# Patient Record
Sex: MALE | Race: BLACK OR AFRICAN AMERICAN | Employment: FULL TIME | URBAN - METROPOLITAN AREA
[De-identification: names, ages, dates, MRNs, and addresses within clinical notes are randomized per-mention and may not be internally consistent; named-entity substitution may affect disease eponyms.]

---

## 2017-01-26 DIAGNOSIS — R60.0 BILATERAL EDEMA OF LOWER EXTREMITY: ICD-10-CM

## 2017-01-26 DIAGNOSIS — I10 ESSENTIAL HYPERTENSION: ICD-10-CM

## 2017-01-26 DIAGNOSIS — J45.20 ASTHMA, MILD INTERMITTENT: ICD-10-CM

## 2017-02-02 RX ORDER — ALBUTEROL SULFATE 90 UG/1
1 AEROSOL, METERED RESPIRATORY (INHALATION)
Qty: 1 INHALER | Refills: 11 | Status: SHIPPED | OUTPATIENT
Start: 2017-02-02 | End: 2017-04-11 | Stop reason: SDUPTHER

## 2017-02-02 RX ORDER — FUROSEMIDE 80 MG/1
TABLET ORAL
Qty: 30 TAB | Refills: 2 | Status: SHIPPED | OUTPATIENT
Start: 2017-02-02 | End: 2017-07-04 | Stop reason: SDUPTHER

## 2017-02-02 RX ORDER — OLMESARTAN MEDOXOMIL, AMLODIPINE AND HYDROCHLOROTHIAZIDE TABLET 40/10/25 MG 40; 10; 25 MG/1; MG/1; MG/1
TABLET ORAL
Qty: 30 TAB | Refills: 5 | Status: SHIPPED | OUTPATIENT
Start: 2017-02-02 | End: 2017-08-03 | Stop reason: SDUPTHER

## 2017-02-23 DIAGNOSIS — E78.5 HYPERLIPIDEMIA: ICD-10-CM

## 2017-02-23 DIAGNOSIS — I10 ESSENTIAL HYPERTENSION: ICD-10-CM

## 2017-02-23 DIAGNOSIS — E03.8 TSH (THYROID-STIMULATING HORMONE DEFICIENCY): ICD-10-CM

## 2017-02-23 DIAGNOSIS — E55.9 VITAMIN D DEFICIENCY: ICD-10-CM

## 2017-02-27 RX ORDER — ERGOCALCIFEROL 1.25 MG/1
CAPSULE ORAL
Qty: 4 CAP | Refills: 0 | Status: SHIPPED | OUTPATIENT
Start: 2017-02-27 | End: 2017-11-20 | Stop reason: SDUPTHER

## 2017-04-11 ENCOUNTER — OFFICE VISIT (OUTPATIENT)
Dept: FAMILY MEDICINE CLINIC | Age: 47
End: 2017-04-11

## 2017-04-11 VITALS
RESPIRATION RATE: 18 BRPM | BODY MASS INDEX: 42.66 KG/M2 | WEIGHT: 315 LBS | OXYGEN SATURATION: 95 % | HEIGHT: 72 IN | DIASTOLIC BLOOD PRESSURE: 83 MMHG | SYSTOLIC BLOOD PRESSURE: 152 MMHG | HEART RATE: 63 BPM | TEMPERATURE: 97.3 F

## 2017-04-11 DIAGNOSIS — E78.00 HYPERCHOLESTEROLEMIA: ICD-10-CM

## 2017-04-11 DIAGNOSIS — R94.31 EKG, ABNORMAL: Primary | ICD-10-CM

## 2017-04-11 DIAGNOSIS — J20.9 ACUTE BRONCHITIS, UNSPECIFIED ORGANISM: ICD-10-CM

## 2017-04-11 DIAGNOSIS — I10 ESSENTIAL HYPERTENSION: ICD-10-CM

## 2017-04-11 DIAGNOSIS — J45.21 MILD INTERMITTENT ASTHMA WITH ACUTE EXACERBATION: ICD-10-CM

## 2017-04-11 LAB — HBA1C MFR BLD HPLC: 8.7 %

## 2017-04-11 RX ORDER — GUAIFENESIN AND DEXTROMETHORPHAN HYDROBROMIDE 1200; 60 MG/1; MG/1
1 TABLET, EXTENDED RELEASE ORAL
Qty: 30 TAB | Refills: 0
Start: 2017-04-11 | End: 2017-07-11 | Stop reason: ALTCHOICE

## 2017-04-11 RX ORDER — DEXAMETHASONE 1.5 MG/1
TABLET ORAL
Qty: 21 TAB | Refills: 0 | Status: SHIPPED | OUTPATIENT
Start: 2017-04-11 | End: 2017-07-11 | Stop reason: ALTCHOICE

## 2017-04-11 RX ORDER — HYDROCODONE POLISTIREX AND CHLORPHENIRAMINE POLISTIREX 10; 8 MG/5ML; MG/5ML
5 SUSPENSION, EXTENDED RELEASE ORAL
Qty: 120 ML | Refills: 0 | Status: SHIPPED | OUTPATIENT
Start: 2017-04-11 | End: 2017-11-20 | Stop reason: ALTCHOICE

## 2017-04-11 RX ORDER — ALBUTEROL SULFATE 90 UG/1
1 AEROSOL, METERED RESPIRATORY (INHALATION)
Qty: 1 INHALER | Refills: 11 | Status: SHIPPED | OUTPATIENT
Start: 2017-04-11 | End: 2019-01-07 | Stop reason: SDUPTHER

## 2017-04-11 RX ORDER — LEVOFLOXACIN 500 MG/1
500 TABLET, FILM COATED ORAL DAILY
Qty: 10 TAB | Refills: 0 | Status: SHIPPED | OUTPATIENT
Start: 2017-04-11 | End: 2017-04-21

## 2017-04-11 RX ORDER — GLIPIZIDE 10 MG/1
15 TABLET ORAL 2 TIMES DAILY
Qty: 90 TAB | Refills: 4 | Status: SHIPPED | OUTPATIENT
Start: 2017-04-11 | End: 2017-07-11 | Stop reason: SDUPTHER

## 2017-04-11 NOTE — MR AVS SNAPSHOT
Visit Information Date & Time Provider Department Dept. Phone Encounter #  
 4/11/2017  3:45 PM Joann Chandler MD [facility] OFFICE-[facility] [phone] [id number] Follow-up Instructions Return if symptoms worsen or fail to improve. Your Appointments 7/11/2017 10:45 AM  
ROUTINE CARE with Joann Chandler MD  
[facility] ([address]) Appt Note: 3 mnth fu  
 [address] [place] [id number] [phone] [phone] Upcoming Health Maintenance Date Due INFLUENZA AGE 9 TO ADULT 8/1/2016 LIPID PANEL Q1 3/28/2017 HEMOGLOBIN A1C Q6M 5/22/2017 EYE EXAM RETINAL OR DILATED Q1 7/5/2017 MICROALBUMIN Q1 8/8/2017 FOOT EXAM Q1 11/22/2017 DTaP/Tdap/Td series (2 - Td) 2/21/2024 Allergies as of 4/11/2017  Review Complete On: 4/11/2017 By: Willow Can LPN Severity Noted Reaction Type Reactions Wheat  01/12/2015    Hives Current Immunizations  Reviewed on 11/9/2015 Name Date Influenza Vaccine 11/1/2014 Influenza Vaccine PF  Deferred (Patient Refused) Influenza Vaccine Split 1/4/2012, 9/28/2010 Pneumococcal Polysaccharide (PPSV-23) 2/21/2014 Tdap 2/21/2014 Not reviewed this visit You Were Diagnosed With   
  
 Codes Comments EKG, abnormal    -  Primary ICD-10-CM: R94.31 
ICD-9-CM: 794.31 Acute bronchitis, unspecified organism     ICD-10-CM: J20.9 ICD-9-CM: 466.0 Mild intermittent asthma with acute exacerbation     ICD-10-CM: J45.21 ICD-9-CM: 787.39 Essential hypertension     ICD-10-CM: I10 
ICD-9-CM: 401.9 Hypercholesterolemia     ICD-10-CM: E78.00 ICD-9-CM: 272.0 Vitals BP Pulse Temp Resp Height(growth percentile) Weight(growth percentile)  152/83 (BP 1 Location: Left arm, BP Patient Position: Sitting) 63 97.3 °F (36.3 °C) (Oral) 18 6' (1.829 m) (!) 404 lb 12.8 oz (183.6 kg) SpO2 BMI Smoking Status 95% 54.9 kg/m2 Never Smoker Vitals History BMI and BSA Data Body Mass Index Body Surface Area 54.9 kg/m 2 3.05 m 2 Preferred Pharmacy Pharmacy Name Phone [facility] [address], [address] Your Updated Medication List  
  
   
This list is accurate as of: 4/11/17  5:02 PM.  Always use your most recent med list.  
  
  
  
  
 albuterol 90 mcg/actuation inhaler Commonly known as:  PROAIR HFA Take 1 Puff by inhalation every six (6) hours as needed for Wheezing or Shortness of Breath. aspirin 81 mg tablet Take 1 Tab by mouth daily. betamethasone dipropionate 0.05 % topical cream  
Commonly known as:  Beatrice Prow Apply  to affected area two (2) times a day. chlorpheniramine-HYDROcodone 10-8 mg/5 mL suspension Commonly known as:  Felisha Orange Take 5 mL by mouth every twelve (12) hours as needed for Cough. Max Daily Amount: 10 mL. dexamethasone 1.5 mg tablet Commonly known as:  DECADRON Day 1: 6 tabs by mouth,  Day 2: 5 tabs orally  Day 3: 4 tabs orally   Day 4: 3 tabs orally  Day 5: 2 tabs orally  Day 6: 1 tab orally with 12 oz of water daily for the total of 6 days  
  
 dextromethorphan-guaiFENesin 60-1,200 mg Tb12 Commonly known as:  [place] [address] DM Take 1 Tab by mouth two (2) times daily as needed. Indications: COLD SYMPTOMS, COUGH  
  
 empagliflozin-linagliptin 25-5 mg Tab Commonly known as:  GLYXAMBI Take 1 Tab by mouth daily. ergocalciferol 50,000 unit capsule Commonly known as:  ERGOCALCIFEROL  
TAKE 1 CAPSULE BY MOUTH EVERY 7 DAYS  
  
 fluticasone-salmeterol 250-50 mcg/dose diskus inhaler Commonly known as:  ADVAIR Take 1 Puff by inhalation every twelve (12) hours. furosemide 80 mg tablet Commonly known as:  LASIX TAKE ONE TABLET BY MOUTH  ONCE DAILY  Indications: Edema  
  
 glipiZIDE 10 mg tablet Commonly known as:  Waldo Argue Take 1.5 Tabs by mouth two (2) times a day. glucose blood VI test strips strip Commonly known as:  ASCENSIA AUTODISC VI, ONE TOUCH ULTRA TEST VI  
by Does Not Apply route. As directed  
  
 ketoconazole 2 % topical cream  
Commonly known as:  NIZORAL Apply  to affected area two (2) times a day. Lancets Misc  
by Does Not Apply route. As directed  
  
 levoFLOXacin 500 mg tablet Commonly known as:  Rella Gene Take 1 Tab by mouth daily for 10 days. metFORMIN 1,000 mg tablet Commonly known as:  GLUCOPHAGE Take 1 Tab by mouth two (2) times daily (with meals). Olmesartan-amLODIPine-HCTZ 40-10-25 mg Tab Commonly known as:  TRIBENZOR  
TAKE ONE TABLET BY MOUTH EVERY DAY  
  
 potassium chloride 20 mEq tablet Commonly known as:  K-DUR, KLOR-CON Take 1 Tab by mouth two (2) times a day. Indications: HYPOKALEMIA  
  
 tamsulosin 0.4 mg capsule Commonly known as:  FLOMAX Take 1 Cap by mouth daily. Prescriptions Printed Refills  
 levoFLOXacin (LEVAQUIN) 500 mg tablet 0 Sig: Take 1 Tab by mouth daily for 10 days. Class: Print Route: Oral  
 chlorpheniramine-HYDROcodone (TUSSIONEX) 10-8 mg/5 mL suspension 0 Sig: Take 5 mL by mouth every twelve (12) hours as needed for Cough. Max Daily Amount: 10 mL. Class: Print Route: Oral  
 dexamethasone (DECADRON) 1.5 mg tablet 0 Sig: Day 1: 6 tabs by mouth,  
Day 2: 5 tabs orally Day 3: 4 tabs orally Day 4: 3 tabs orally Day 5: 2 tabs orally Day 6: 1 tab orally with 12 oz of water daily for the total of 6 days Class: Print  
 glipiZIDE (GLUCOTROL) 10 mg tablet 4 Sig: Take 1.5 Tabs by mouth two (2) times a day. Class: Print Route: Oral  
 albuterol (PROAIR HFA) 90 mcg/actuation inhaler 11  Sig: Take 1 Puff by inhalation every six (6) hours as needed for Wheezing or Shortness of Breath. Class: Print Route: Inhalation We Performed the Following AMB POC HEMOGLOBIN A1C [09905 CPT(R)] CBC W/O DIFF [56921 CPT(R)] LIPID PANEL [11818 CPT(R)] METABOLIC PANEL, COMPREHENSIVE [95824 CPT(R)] MICROALBUMIN, UR, RAND W/ MICROALBUMIN/CREA RATIO [id number] CPT(R)] TSH 3RD GENERATION [97186 CPT(R)] Follow-up Instructions Return if symptoms worsen or fail to improve. Introducing [facility] SERVICES! Dear Lucia Ríos: 
Thank you for requesting a [employer] account. Our records indicate that you already have an active [employer] account. You can access your account anytime at [email or web address] Did you know that you can access your hospital and ER discharge instructions at any time in [employer]? You can also review all of your test results from your hospital stay or ER visit. Additional Information If you have questions, please visit the Frequently Asked Questions section of the [employer] website at [email or web address] Remember, [employer] is NOT to be used for urgent needs. For medical emergencies, dial 911. Now available from your iPhone and Android! Please provide this summary of care documentation to your next provider. Your primary care clinician is listed as Jeremy Hannah. If you have any questions after today's visit, please call [phone].

## 2017-04-11 NOTE — LETTER
NOTIFICATION RETURN TO WORK / SCHOOL 
 
4/11/2017 4:52 PM 
 
Mr. Miladis Rodriguez [address] [place] [address] To Whom It May Concern: 
 
Miladis Rodriguez is currently under the care of [facility] OFFICE-ANN[facility]. He will return to work/school on: 4/14/2017 If there are questions or concerns please have the patient contact our office. Sincerely, Cassy Claire MD

## 2017-04-11 NOTE — PROGRESS NOTES
Chief Complaint   Patient presents with    Diabetes    Cholesterol Problem    Hypertension     4 mo check

## 2017-04-11 NOTE — PROGRESS NOTES
HISTORY OF PRESENT ILLNESS  César Ratliff is a 48 y.o. male. HPI   DMtype II    Compliant w/ meds, having no diabetic diet, has been doing a lot of pasta and not been able to be active,  obtains home glucose monitoring averaging  200's  . No Rf needed for today. Denies any tingling sensation, polyurea and polydipsia, last a1c was at target 7.% and today is at 8.9%    . Last podiatry visit 2015   And last eye exam was 2015  Last urine microalbumin 2016 and was normal  . Feeling better since the last visit. Upper respiratory problem    Started >14 days ago not better,   otc not helping, have a very bad Sore throat, with a lot of painful Cough which are Productive yellowish ,++ hx of asthma , lost his Inhaler, last use was few months ago, upotdate w/ his vaccinations there has been a lot of decrease in the patient's sleep pattern, in addition there has not been some muscle ache responding to OTC , no diarhea, no ear ache,also there has been a decrease in the appetite, has been had an exposure to sick person, fortunately a none smoker, unable to do his job, needs work note toady be excused for 1-2 days      HTN  Today pt present for Bp check and the patient stating that so far there has been a Compliancy w/ the bp meds, he is not having had the low salt diet   the patient hasnot  been active life style and doesnot do the daily walking since the last visit,overall he denies Chest Pain, has no legs swelling no lightheadedness,  the pat has not been feeling anxious,       Current Outpatient Prescriptions   Medication Sig Dispense Refill    levoFLOXacin (LEVAQUIN) 500 mg tablet Take 1 Tab by mouth daily for 10 days. 10 Tab 0    chlorpheniramine-HYDROcodone (TUSSIONEX) 10-8 mg/5 mL suspension Take 5 mL by mouth every twelve (12) hours as needed for Cough. Max Daily Amount: 10 mL. 120 mL 0    dextromethorphan-guaiFENesin (MUCINEX DM) 60-1,200 mg Tb12 Take 1 Tab by mouth two (2) times daily as needed.  Indications: COLD SYMPTOMS, COUGH 30 Tab 0    dexamethasone (DECADRON) 1.5 mg tablet Day 1: 6 tabs by mouth,   Day 2: 5 tabs orally   Day 3: 4 tabs orally    Day 4: 3 tabs orally   Day 5: 2 tabs orally   Day 6: 1 tab orally with 12 oz of water daily for the total of 6 days 21 Tab 0    ergocalciferol (ERGOCALCIFEROL) 50,000 unit capsule TAKE 1 CAPSULE BY MOUTH EVERY 7 DAYS 4 Cap 0    albuterol (PROAIR HFA) 90 mcg/actuation inhaler Take 1 Puff by inhalation every six (6) hours as needed for Wheezing. 1 Inhaler 11    Olmesartan-amLODIPine-HCTZ (TRIBENZOR) 40-10-25 mg tab TAKE ONE TABLET BY MOUTH EVERY DAY 30 Tab 5    furosemide (LASIX) 80 mg tablet TAKE ONE TABLET BY MOUTH  ONCE DAILY  Indications: Edema 30 Tab 2    empagliflozin-linagliptin (GLYXAMBI) 25-5 mg tab Take 1 Tab by mouth daily. 30 Tab 6    metFORMIN (GLUCOPHAGE) 1,000 mg tablet Take 1 Tab by mouth two (2) times daily (with meals). 60 Tab 4    glipiZIDE (GLUCOTROL) 5 mg tablet Take 2 Tabs by mouth two (2) times a day. 120 Tab 4    ketoconazole (NIZORAL) 2 % topical cream Apply  to affected area two (2) times a day. 60 g 6    potassium chloride (K-DUR, KLOR-CON) 20 mEq tablet Take 1 Tab by mouth two (2) times a day. Indications: HYPOKALEMIA 60 Tab 2    betamethasone dipropionate (DIPROSONE) 0.05 % topical cream Apply  to affected area two (2) times a day. 15 g 0    aspirin 81 mg tablet Take 1 Tab by mouth daily. 30 Tab 11    glucose blood VI test strips (ASCENSIA AUTODISC VI, ONE TOUCH ULTRA TEST VI) strip by Does Not Apply route. As directed 1 Package 11    Lancets Misc by Does Not Apply route. As directed   1 Package 11    tamsulosin (FLOMAX) 0.4 mg capsule Take 1 Cap by mouth daily. 30 Cap 0    fluticasone-salmeterol (ADVAIR) 250-50 mcg/dose diskus inhaler Take 1 Puff by inhalation every twelve (12) hours.  1 Inhaler 7     Allergies   Allergen Reactions    Wheat Hives     Past Medical History:   Diagnosis Date    Diabetes mellitus type II, uncontrolled ([facility] 8/8/2014    Diabetic polyneuropathy associated with type 2 diabetes mellitus ([facility] 11/22/2016    DM (diabetes mellitus) ([facility] 6/15/2010    GERD (gastroesophageal reflux disease) 2/21/2014    Hypercholesterolemia 6/15/2010    Hypertension     Prostate infarction 3/28/2016    Screening for depression 3/30/2014    Tinea cruris 3/13/2014    Urinary frequency 3/28/2016     History reviewed. No pertinent surgical history. Family History   Problem Relation Age of Onset    Lung Disease Mother     Hypertension Father     Heart Disease Father      Social History   Substance Use Topics    Smoking status: Never Smoker    Smokeless tobacco: Never Used    Alcohol use No      Lab Results  Component Value Date/Time   WBC 10.0 08/08/2016 12:24 PM   HGB 14.6 08/08/2016 12:24 PM   HCT 44.0 08/08/2016 12:24 PM   PLATELET 591 16/64/0974 12:24 PM   MCV 81 08/08/2016 12:24 PM       Lab Results  Component Value Date/Time   Hemoglobin A1c 8.6 08/08/2014 12:18 PM   Hemoglobin A1c 8.6 02/21/2014 10:04 AM   Hemoglobin A1c 8.3 07/20/2012 10:24 AM   Glucose 195 08/08/2016 12:24 PM   Glucose (POC) 292 06/15/2010 05:14 AM   Glucose POC 205mg/dl 06/15/2010 03:13 PM   Microalbumin/Creat ratio (mg/g creat) 117 06/15/2010 04:51 PM   Microalb/Creat ratio (ug/mg creat.) 76.4 08/08/2016 12:24 PM   Microalbumin,urine random 6.79 06/15/2010 04:51 PM   LDL, calculated 88 11/09/2015 10:55 AM   Creatinine 1.52 08/08/2016 12:24 PM      Lab Results  Component Value Date/Time   Cholesterol, total 151 03/28/2016 12:12 PM   HDL Cholesterol 45 03/28/2016 12:12 PM   LDL, calculated 88 11/09/2015 10:55 AM   Triglyceride 117 11/09/2015 10:55 AM   CHOL/HDL Ratio 4.0 08/17/2010 10:34 AM          Review of Systems   Constitutional: Positive for chills, fever and malaise/fatigue. HENT: Positive for congestion and sore throat. Negative for ear pain and nosebleeds. Eyes: Negative for blurred vision, pain and discharge.    Respiratory: Positive for cough, sputum production and wheezing. Negative for shortness of breath. Cardiovascular: Negative for chest pain and leg swelling. Gastrointestinal: Negative for constipation, diarrhea, nausea and vomiting. Genitourinary: Negative for frequency. Musculoskeletal: Negative for joint pain. Skin: Negative for itching and rash. Neurological: Positive for headaches. Psychiatric/Behavioral: Negative for depression. The patient is not nervous/anxious. Physical Exam   Constitutional: He is oriented to person, place, and time. He appears well-developed and well-nourished. HENT:   Head: Normocephalic and atraumatic. Nose: Mucosal edema and rhinorrhea present. Mouth/Throat: Mucous membranes are dry. Posterior oropharyngeal edema and posterior oropharyngeal erythema present. No oropharyngeal exudate. Eyes: Conjunctivae and EOM are normal.   Neck: Normal range of motion. Neck supple. Cardiovascular: Normal rate, regular rhythm and normal heart sounds. No murmur heard. Pulmonary/Chest: Effort normal and breath sounds normal. No respiratory distress. Abdominal: Soft. Bowel sounds are normal. He exhibits no distension. There is no rebound. Musculoskeletal: He exhibits no edema or tenderness. Lymphadenopathy:     He has cervical adenopathy. Neurological: He is alert and oriented to person, place, and time. Skin: Skin is warm. No erythema. Psychiatric: He has a normal mood and affect. His behavior is normal.   Nursing note and vitals reviewed. ASSESSMENT and Saurabh Rangel was seen today for diabetes, cholesterol problem and hypertension.     Diagnoses and all orders for this visit:    EKG, abnormal  -     CBC W/O DIFF  -     METABOLIC PANEL, COMPREHENSIVE  -     TSH 3RD GENERATION  -     AMB POC HEMOGLOBIN A1C  -     Cancel: CHOLESTEROL, TOTAL  -     Cancel: CHOLESTEROL, HDL  -     MICROALBUMIN, UR, RAND W/ MICROALBUMIN/CREA RATIO  -     LIPID PANEL    Acute bronchitis, unspecified organism  -     CBC W/O DIFF  -     METABOLIC PANEL, COMPREHENSIVE  -     TSH 3RD GENERATION  -     AMB POC HEMOGLOBIN A1C  -     Cancel: CHOLESTEROL, TOTAL  -     Cancel: CHOLESTEROL, HDL  -     MICROALBUMIN, UR, RAND W/ MICROALBUMIN/CREA RATIO  -     LIPID PANEL    Mild intermittent asthma with acute exacerbation  -     CBC W/O DIFF  -     METABOLIC PANEL, COMPREHENSIVE  -     TSH 3RD GENERATION  -     AMB POC HEMOGLOBIN A1C  -     Cancel: CHOLESTEROL, TOTAL  -     Cancel: CHOLESTEROL, HDL  -     MICROALBUMIN, UR, RAND W/ MICROALBUMIN/CREA RATIO  -     LIPID PANEL  -     albuterol (PROAIR HFA) 90 mcg/actuation inhaler; Take 1 Puff by inhalation every six (6) hours as needed for Wheezing or Shortness of Breath. Essential hypertension  -     CBC W/O DIFF  -     METABOLIC PANEL, COMPREHENSIVE  -     TSH 3RD GENERATION  -     AMB POC HEMOGLOBIN A1C  -     Cancel: CHOLESTEROL, TOTAL  -     Cancel: CHOLESTEROL, HDL  -     MICROALBUMIN, UR, RAND W/ MICROALBUMIN/CREA RATIO  -     LIPID PANEL    Hypercholesterolemia  -     CBC W/O DIFF  -     METABOLIC PANEL, COMPREHENSIVE  -     TSH 3RD GENERATION  -     AMB POC HEMOGLOBIN A1C  -     Cancel: CHOLESTEROL, TOTAL  -     Cancel: CHOLESTEROL, HDL  -     MICROALBUMIN, UR, RAND W/ MICROALBUMIN/CREA RATIO  -     LIPID PANEL    Other orders  -     levoFLOXacin (LEVAQUIN) 500 mg tablet; Take 1 Tab by mouth daily for 10 days. -     chlorpheniramine-HYDROcodone (TUSSIONEX) 10-8 mg/5 mL suspension; Take 5 mL by mouth every twelve (12) hours as needed for Cough. Max Daily Amount: 10 mL. -     dextromethorphan-guaiFENesin (MUCINEX DM) 60-1,200 mg Tb12; Take 1 Tab by mouth two (2) times daily as needed.  Indications: COLD SYMPTOMS, COUGH  -     dexamethasone (DECADRON) 1.5 mg tablet; Day 1: 6 tabs by mouth,   Day 2: 5 tabs orally   Day 3: 4 tabs orally    Day 4: 3 tabs orally   Day 5: 2 tabs orally   Day 6: 1 tab orally with 12 oz of water daily for the total of 6 days  - glipiZIDE (GLUCOTROL) 10 mg tablet; Take 1.5 Tabs by mouth two (2) times a day.

## 2017-04-12 LAB
ALBUMIN SERPL-MCNC: 3.7 G/DL (ref 3.5–5.5)
ALBUMIN/CREAT UR: 113.8 MG/G CREAT (ref 0–30)
ALBUMIN/GLOB SERPL: 0.9 {RATIO} (ref 1.2–2.2)
ALP SERPL-CCNC: 62 IU/L (ref 39–117)
ALT SERPL-CCNC: 24 IU/L (ref 0–44)
AST SERPL-CCNC: 21 IU/L (ref 0–40)
BILIRUB SERPL-MCNC: 0.4 MG/DL (ref 0–1.2)
BUN SERPL-MCNC: 19 MG/DL (ref 6–24)
BUN/CREAT SERPL: 14 (ref 9–20)
CALCIUM SERPL-MCNC: 9.1 MG/DL (ref 8.7–10.2)
CHLORIDE SERPL-SCNC: 97 MMOL/L (ref 96–106)
CHOLEST SERPL-MCNC: 156 MG/DL (ref 100–199)
CO2 SERPL-SCNC: 26 MMOL/L (ref 18–29)
CREAT SERPL-MCNC: 1.35 MG/DL (ref 0.76–1.27)
CREAT UR-MCNC: 55 MG/DL
ERYTHROCYTE [DISTWIDTH] IN BLOOD BY AUTOMATED COUNT: 16.6 % (ref 12.3–15.4)
GLOBULIN SER CALC-MCNC: 4.2 G/DL (ref 1.5–4.5)
GLUCOSE SERPL-MCNC: 201 MG/DL (ref 65–99)
HCT VFR BLD AUTO: 44.2 % (ref 37.5–51)
HDLC SERPL-MCNC: 42 MG/DL
HGB BLD-MCNC: 14 G/DL (ref 12.6–17.7)
LDLC SERPL CALC-MCNC: 82 MG/DL (ref 0–99)
MCH RBC QN AUTO: 27.2 PG (ref 26.6–33)
MCHC RBC AUTO-ENTMCNC: 31.7 G/DL (ref 31.5–35.7)
MCV RBC AUTO: 86 FL (ref 79–97)
MICROALBUMIN UR-MCNC: 62.6 UG/ML
PLATELET # BLD AUTO: 238 X10E3/UL (ref 150–379)
POTASSIUM SERPL-SCNC: 4 MMOL/L (ref 3.5–5.2)
PROT SERPL-MCNC: 7.9 G/DL (ref 6–8.5)
RBC # BLD AUTO: 5.15 X10E6/UL (ref 4.14–5.8)
SODIUM SERPL-SCNC: 142 MMOL/L (ref 134–144)
TRIGL SERPL-MCNC: 162 MG/DL (ref 0–149)
TSH SERPL DL<=0.005 MIU/L-ACNC: 3.07 UIU/ML (ref 0.45–4.5)
VLDLC SERPL CALC-MCNC: 32 MG/DL (ref 5–40)
WBC # BLD AUTO: 9.8 X10E3/UL (ref 3.4–10.8)

## 2017-07-04 DIAGNOSIS — R60.0 BILATERAL EDEMA OF LOWER EXTREMITY: ICD-10-CM

## 2017-07-05 RX ORDER — FUROSEMIDE 80 MG/1
TABLET ORAL
Qty: 30 TAB | Refills: 0 | Status: SHIPPED | OUTPATIENT
Start: 2017-07-05 | End: 2017-08-07 | Stop reason: SDUPTHER

## 2017-07-11 ENCOUNTER — OFFICE VISIT (OUTPATIENT)
Dept: FAMILY MEDICINE CLINIC | Age: 47
End: 2017-07-11

## 2017-07-11 VITALS
DIASTOLIC BLOOD PRESSURE: 77 MMHG | WEIGHT: 315 LBS | TEMPERATURE: 98.3 F | HEIGHT: 72 IN | SYSTOLIC BLOOD PRESSURE: 147 MMHG | HEART RATE: 62 BPM | RESPIRATION RATE: 14 BRPM | BODY MASS INDEX: 42.66 KG/M2 | OXYGEN SATURATION: 97 %

## 2017-07-11 DIAGNOSIS — E78.00 HYPERCHOLESTEROLEMIA: ICD-10-CM

## 2017-07-11 DIAGNOSIS — E11.42 DIABETIC POLYNEUROPATHY ASSOCIATED WITH TYPE 2 DIABETES MELLITUS (HCC): ICD-10-CM

## 2017-07-11 DIAGNOSIS — I10 ESSENTIAL HYPERTENSION: Primary | ICD-10-CM

## 2017-07-11 DIAGNOSIS — E55.9 VITAMIN D DEFICIENCY: ICD-10-CM

## 2017-07-11 DIAGNOSIS — R60.0 BILATERAL EDEMA OF LOWER EXTREMITY: ICD-10-CM

## 2017-07-11 LAB — HBA1C MFR BLD HPLC: 7.8 %

## 2017-07-11 RX ORDER — GLIPIZIDE 10 MG/1
15 TABLET ORAL 2 TIMES DAILY
Qty: 90 TAB | Refills: 4
Start: 2017-07-11 | End: 2017-10-08 | Stop reason: SDUPTHER

## 2017-07-11 RX ORDER — METOLAZONE 5 MG/1
5 TABLET ORAL DAILY
Qty: 30 TAB | Refills: 1 | Status: SHIPPED | OUTPATIENT
Start: 2017-07-11 | End: 2017-09-05 | Stop reason: SDUPTHER

## 2017-07-11 RX ORDER — POTASSIUM CHLORIDE 20 MEQ/1
20 TABLET, EXTENDED RELEASE ORAL 3 TIMES DAILY
Qty: 90 TAB | Refills: 2 | Status: SHIPPED | OUTPATIENT
Start: 2017-07-11 | End: 2017-11-20 | Stop reason: SDUPTHER

## 2017-07-11 NOTE — PATIENT INSTRUCTIONS

## 2017-07-11 NOTE — PROGRESS NOTES
HISTORY OF PRESENT ILLNESS  Antonio Bearden is a 48 y.o. male. HPI   DMtype II    Compliant w/ meds, having some kind of diabetic diet, anddoes his daily walking obtains home glucose monitoring averaging  140's  . No Rf needed for today. Denies any tingling sensation, polyurea and polydipsia,   last a1c was not at target 8.7%%    . Last podiatry visit 2017   And last eye exam was 2017  Last urine microalbumin 2017 and was abnormal  . Feeling better since the last visit. Taking 80mg of lasix not working, and compliant with k intake as wt but his wt has not changed    Vitals 7/11/2017 4/11/2017 4/11/2017 11/22/2016 11/22/2016   Weight 404 lb 8 oz  404 lb 12.8 oz  393 lb     Vitals 8/8/2016   Weight 386 lb 12.8 oz       Current Outpatient Prescriptions   Medication Sig Dispense Refill    furosemide (LASIX) 80 mg tablet TAKE ONE TABLET BY MOUTH ONCE DAILY 30 Tab 0    glipiZIDE (GLUCOTROL) 10 mg tablet Take 1.5 Tabs by mouth two (2) times a day. 90 Tab 4    albuterol (PROAIR HFA) 90 mcg/actuation inhaler Take 1 Puff by inhalation every six (6) hours as needed for Wheezing or Shortness of Breath. 1 Inhaler 11    ergocalciferol (ERGOCALCIFEROL) 50,000 unit capsule TAKE 1 CAPSULE BY MOUTH EVERY 7 DAYS 4 Cap 0    Olmesartan-amLODIPine-HCTZ (TRIBENZOR) 40-10-25 mg tab TAKE ONE TABLET BY MOUTH EVERY DAY 30 Tab 5    empagliflozin-linagliptin (GLYXAMBI) 25-5 mg tab Take 1 Tab by mouth daily. 30 Tab 6    metFORMIN (GLUCOPHAGE) 1,000 mg tablet Take 1 Tab by mouth two (2) times daily (with meals). 60 Tab 4    ketoconazole (NIZORAL) 2 % topical cream Apply  to affected area two (2) times a day. 60 g 6    potassium chloride (K-DUR, KLOR-CON) 20 mEq tablet Take 1 Tab by mouth two (2) times a day. Indications: HYPOKALEMIA 60 Tab 2    betamethasone dipropionate (DIPROSONE) 0.05 % topical cream Apply  to affected area two (2) times a day. 15 g 0    aspirin 81 mg tablet Take 1 Tab by mouth daily.  30 Tab 11    glucose blood VI test strips (ASCENSIA AUTODISC VI, ONE TOUCH ULTRA TEST VI) strip by Does Not Apply route. As directed 1 Package 11    Lancets Misc by Does Not Apply route. As directed   1 Package 11    chlorpheniramine-HYDROcodone (TUSSIONEX) 10-8 mg/5 mL suspension Take 5 mL by mouth every twelve (12) hours as needed for Cough. Max Daily Amount: 10 mL. 120 mL 0    tamsulosin (FLOMAX) 0.4 mg capsule Take 1 Cap by mouth daily. 30 Cap 0    fluticasone-salmeterol (ADVAIR) 250-50 mcg/dose diskus inhaler Take 1 Puff by inhalation every twelve (12) hours. 1 Inhaler 7     Allergies   Allergen Reactions    Wheat Hives     Past Medical History:   Diagnosis Date    Diabetes mellitus type II, uncontrolled ([facility] 8/8/2014    Diabetic polyneuropathy associated with type 2 diabetes mellitus ([facility] [address] 11/22/2016    DM (diabetes mellitus) ([facility] [address] 6/15/2010    GERD (gastroesophageal reflux disease) 2/21/2014    Hypercholesterolemia 6/15/2010    Hypertension     Prostate infarction 3/28/2016    Screening for depression 3/30/2014    Tinea cruris 3/13/2014    Urinary frequency 3/28/2016     History reviewed. No pertinent surgical history.   Family History   Problem Relation Age of Onset    Lung Disease Mother     Hypertension Father     Heart Disease Father      Social History   Substance Use Topics    Smoking status: Never Smoker    Smokeless tobacco: Never Used    Alcohol use No      Lab Results  Component Value Date/Time   Hemoglobin A1c 8.6 08/08/2014 12:18 PM   Hemoglobin A1c 8.6 02/21/2014 10:04 AM   Hemoglobin A1c 8.3 07/20/2012 10:24 AM   Glucose 201 04/11/2017 04:22 PM   Glucose (POC) 292 06/15/2010 05:14 AM   Glucose POC 205mg/dl 06/15/2010 03:13 PM   Microalbumin/Creat ratio (mg/g creat) 117 06/15/2010 04:51 PM   Microalb/Creat ratio (ug/mg creat.) 113.8 04/11/2017 04:22 PM   Microalbumin,urine random 6.79 06/15/2010 04:51 PM   LDL, calculated 82 04/11/2017 04:22 PM   Creatinine 1.35 04/11/2017 04:22 PM      Lab Results  Component Value Date/Time   Cholesterol, total 156 04/11/2017 04:22 PM   HDL Cholesterol 42 04/11/2017 04:22 PM   LDL, calculated 82 04/11/2017 04:22 PM   Triglyceride 162 04/11/2017 04:22 PM   CHOL/HDL Ratio 4.0 08/17/2010 10:34 AM     Lab Results  Component Value Date/Time   ALT (SGPT) 24 04/11/2017 04:22 PM   AST (SGOT) 21 04/11/2017 04:22 PM   Alk. phosphatase 62 04/11/2017 04:22 PM   Bilirubin, total 0.4 04/11/2017 04:22 PM   Albumin 3.7 04/11/2017 04:22 PM   Protein, total 7.9 04/11/2017 04:22 PM   INR 1.2 06/12/2010 10:17 PM   Prothrombin time 12.7 06/12/2010 10:17 PM   PLATELET 001 86/06/4815 04:22 PM       Lab Results  Component Value Date/Time   GFR est non-AA 62 04/11/2017 04:22 PM   GFR est AA 71 04/11/2017 04:22 PM   Creatinine 1.35 04/11/2017 04:22 PM   BUN 19 04/11/2017 04:22 PM   Sodium 142 04/11/2017 04:22 PM   Potassium 4.0 04/11/2017 04:22 PM   Chloride 97 04/11/2017 04:22 PM   CO2 26 04/11/2017 04:22 PM   Magnesium 1.8 06/14/2010 04:45 AM            Review of Systems   Constitutional: Negative for chills and fever. HENT: Negative for ear pain and nosebleeds. Eyes: Negative for blurred vision, pain and discharge. Respiratory: Negative for shortness of breath. Cardiovascular: Negative for chest pain and leg swelling. Gastrointestinal: Negative for constipation, diarrhea, nausea and vomiting. Genitourinary: Negative for frequency. Musculoskeletal: Negative for joint pain. Skin: Negative for itching and rash. Neurological: Negative for headaches. Psychiatric/Behavioral: Negative for depression. The patient is not nervous/anxious. Physical Exam   Constitutional: He is oriented to person, place, and time. He appears well-developed and well-nourished. HENT:   Head: Normocephalic and atraumatic. Mouth/Throat: No oropharyngeal exudate. Eyes: Conjunctivae and EOM are normal.   Neck: Normal range of motion. Neck supple.    Cardiovascular: Normal rate, regular rhythm and normal heart sounds. No murmur heard. Pulmonary/Chest: Effort normal and breath sounds normal. No respiratory distress. Abdominal: Soft. Bowel sounds are normal. He exhibits no distension. There is no rebound. Musculoskeletal: He exhibits no edema or tenderness. Neurological: He is alert and oriented to person, place, and time. Skin: Skin is warm. No erythema. Psychiatric: He has a normal mood and affect. His behavior is normal.   Nursing note and vitals reviewed. ASSESSMENT and Shona Kathleen was seen today for diabetes and hypertension. Diagnoses and all orders for this visit:    Essential hypertension  -     CBC W/O DIFF  -     METABOLIC PANEL, COMPREHENSIVE  -     LIPID PANEL  -     VITAMIN D, 25 HYDROXY    Hypercholesterolemia  -     CBC W/O DIFF  -     METABOLIC PANEL, COMPREHENSIVE  -     LIPID PANEL  -     VITAMIN D, 25 HYDROXY    Vitamin D deficiency  -     CBC W/O DIFF  -     METABOLIC PANEL, COMPREHENSIVE  -     LIPID PANEL  -     VITAMIN D, 25 HYDROXY    Diabetic polyneuropathy associated with type 2 diabetes mellitus (HCC)  -     AMB POC HEMOGLOBIN A1C  -     CBC W/O DIFF  -     METABOLIC PANEL, COMPREHENSIVE  -     LIPID PANEL  -     VITAMIN D, 25 HYDROXY    Bilateral edema of lower extremity  -     potassium chloride (K-DUR, KLOR-CON) 20 mEq tablet; Take 1 Tab by mouth three (3) times daily. Indications: hypokalemia    Other orders  -     glipiZIDE (GLUCOTROL) 10 mg tablet; Take 1.5 Tabs by mouth two (2) times a day. -     empagliflozin-linagliptin (GLYXAMBI) 25-5 mg tab; Take 1 Tab by mouth daily. -     metOLazone (ZAROXOLYN) 5 mg tablet; Take 1 Tab by mouth daily.   -     CKD REPORT  -     DIABETES PATIENT EDUCATION

## 2017-07-11 NOTE — PROGRESS NOTES
Mary Gomez      Name and  verified        Chief Complaint   Patient presents with    Diabetes     3 month follow up    Hypertension         Health Maintenance reviewed-discussed with patient. Patient educated on the parts of a diabetes care plan  1. Meal plan  2. Plan for staying active  3. Diabetes medicine plan  4. Plan for checking blood sugar as ordered by Dr. Elver Mcguire  5. Schedule for diabetes follow up appt as recommended by provider      Patient received diabetic education handout. Patient decline retinal last eye exam stating was done two weeks ago.

## 2017-07-11 NOTE — MR AVS SNAPSHOT
Visit Information Date & Time Provider Department Dept. Phone Encounter #  
 7/11/2017 10:45 AM Elver Mcguire MD [address] [facility] [phone] [id number] Upcoming Health Maintenance Date Due  
 EYE EXAM RETINAL OR DILATED Q1 7/5/2017 INFLUENZA AGE 9 TO ADULT 8/1/2017 HEMOGLOBIN A1C Q6M 10/11/2017 FOOT EXAM Q1 11/22/2017 MICROALBUMIN Q1 4/11/2018 LIPID PANEL Q1 4/11/2018 DTaP/Tdap/Td series (2 - Td) 2/21/2024 Allergies as of 7/11/2017  Review Complete On: 7/11/2017 By: Allie Cabrera LPN Severity Noted Reaction Type Reactions Wheat  01/12/2015    Hives Current Immunizations  Reviewed on 11/9/2015 Name Date Influenza Vaccine 11/1/2014 Influenza Vaccine PF  Deferred (Patient Refused) Influenza Vaccine Split 1/4/2012, 9/28/2010 Pneumococcal Polysaccharide (PPSV-23) 2/21/2014 Tdap 2/21/2014 Not reviewed this visit You Were Diagnosed With   
  
 Codes Comments Essential hypertension    -  Primary ICD-10-CM: I10 
ICD-9-CM: 401.9 Hypercholesterolemia     ICD-10-CM: E78.00 ICD-9-CM: 272.0 Vitamin D deficiency     ICD-10-CM: E55.9 ICD-9-CM: 268.9 Diabetic polyneuropathy associated with type 2 diabetes mellitus [facility]     ICD-10-CM: E11.42 
ICD-9-CM: 250.60, 357.2 Bilateral edema of lower extremity     ICD-10-CM: R60.0 ICD-9-CM: 686. 3 Vitals BP Pulse Temp Resp Height(growth percentile) Weight(growth percentile) 147/77 (BP 1 Location: Left arm, BP Patient Position: At rest) 62 98.3 °F (36.8 °C) (Oral) 14 6' (1.829 m) (!) 404 lb 8 oz (183.5 kg) SpO2 BMI Smoking Status 97% 54.86 kg/m2 Never Smoker Vitals History BMI and BSA Data Body Mass Index Body Surface Area 54.86 kg/m 2 3.05 m 2 Preferred Pharmacy Pharmacy Name Phone [facility], [address] [facility] [address] Your Updated Medication List  
 This list is accurate as of: 7/11/17 11:28 AM.  Always use your most recent med list.  
  
  
  
  
 albuterol 90 mcg/actuation inhaler Commonly known as:  PROAIR HFA Take 1 Puff by inhalation every six (6) hours as needed for Wheezing or Shortness of Breath. aspirin 81 mg tablet Take 1 Tab by mouth daily. betamethasone dipropionate 0.05 % topical cream  
Commonly known as:  Gemma Raider Apply  to affected area two (2) times a day. chlorpheniramine-HYDROcodone 10-8 mg/5 mL suspension Commonly known as:  Christia Regulus Take 5 mL by mouth every twelve (12) hours as needed for Cough. Max Daily Amount: 10 mL. empagliflozin-linagliptin 25-5 mg Tab Commonly known as:  GLYXAMBI Take 1 Tab by mouth daily. ergocalciferol 50,000 unit capsule Commonly known as:  ERGOCALCIFEROL  
TAKE 1 CAPSULE BY MOUTH EVERY 7 DAYS  
  
 fluticasone-salmeterol 250-50 mcg/dose diskus inhaler Commonly known as:  ADVAIR Take 1 Puff by inhalation every twelve (12) hours. furosemide 80 mg tablet Commonly known as:  LASIX TAKE ONE TABLET BY MOUTH ONCE DAILY  
  
 glipiZIDE 10 mg tablet Commonly known as:  Evelyn Castro Take 1.5 Tabs by mouth two (2) times a day. glucose blood VI test strips strip Commonly known as:  ASCENSIA AUTODISC VI, ONE TOUCH ULTRA TEST VI  
by Does Not Apply route. As directed  
  
 ketoconazole 2 % topical cream  
Commonly known as:  NIZORAL Apply  to affected area two (2) times a day. Lancets Misc  
by Does Not Apply route. As directed  
  
 metFORMIN 1,000 mg tablet Commonly known as:  GLUCOPHAGE Take 1 Tab by mouth two (2) times daily (with meals). metOLazone 5 mg tablet Commonly known as:  Gordillo Arts Take 1 Tab by mouth daily. Olmesartan-amLODIPine-HCTZ 40-10-25 mg Tab Commonly known as:  TRIBENZOR  
TAKE ONE TABLET BY MOUTH EVERY DAY  
  
 potassium chloride 20 mEq tablet Commonly known as:  K-DUR, KLOR-CON  
 Take 1 Tab by mouth three (3) times daily. Indications: hypokalemia  
  
 tamsulosin 0.4 mg capsule Commonly known as:  FLOMAX Take 1 Cap by mouth daily. Prescriptions Printed Refills  
 metOLazone (ZAROXOLYN) 5 mg tablet 1 Sig: Take 1 Tab by mouth daily. Class: Print Route: Oral  
  
Prescriptions Sent to Pharmacy Refills  
 potassium chloride (K-DUR, KLOR-CON) 20 mEq tablet 2 Sig: Take 1 Tab by mouth three (3) times daily. Indications: hypokalemia Class: Normal  
 Pharmacy: [facility] [address] [place]  #: [phone] Route: Oral  
  
We Performed the Following AMB POC HEMOGLOBIN A1C [29240 CPT(R)] CBC W/O DIFF [32486 CPT(R)] LIPID PANEL [30615 CPT(R)] METABOLIC PANEL, COMPREHENSIVE [48639 CPT(R)] VITAMIN D, 25 HYDROXY [id number] CPT(R)] Patient Instructions Learning About Diabetes Food Guidelines Your Care Instructions Meal planning is important to manage diabetes. It helps keep your blood sugar at a target level (which you set with your doctor). You don't have to eat special foods. You can eat what your family eats, including sweets once in a while. But you do have to pay attention to how often you eat and how much you eat of certain foods. You may want to work with a dietitian or a certified diabetes educator (CDE) to help you plan meals and snacks. A dietitian or CDE can also help you lose weight if that is one of your goals. What should you know about eating carbs? Managing the amount of carbohydrate (carbs) you eat is an important part of healthy meals when you have diabetes. Carbohydrate is found in many foods. · Learn which foods have carbs. And learn the amounts of carbs in different foods. ¨ Bread, cereal, pasta, and rice have about 15 grams of carbs in a serving. A serving is 1 slice of bread (1 ounce), ½ cup of cooked cereal, or 1/3 cup of cooked pasta or rice. ¨ Fruits have 15 grams of carbs in a serving. A serving is 1 small fresh fruit, such as an apple or orange; ½ of a banana; ½ cup of cooked or canned fruit; ½ cup of fruit juice; 1 cup of melon or raspberries; or 2 tablespoons of dried fruit. ¨ Milk and no-sugar-added yogurt have 15 grams of carbs in a serving. A serving is 1 cup of milk or 2/3 cup of no-sugar-added yogurt. ¨ Starchy vegetables have 15 grams of carbs in a serving. A serving is ½ cup of mashed potatoes or sweet potato; 1 cup winter squash; ½ of a small baked potato; ½ cup of cooked beans; or ½ cup cooked corn or green peas. · Learn how much carbs to eat each day and at each meal. A dietitian or CDE can teach you how to keep track of the amount of carbs you eat. This is called carbohydrate counting. · If you are not sure how to count carbohydrate grams, use the Plate Method to plan meals. It is a good, quick way to make sure that you have a balanced meal. It also helps you spread carbs throughout the day. ¨ Divide your plate by types of foods. Put non-starchy vegetables on half the plate, meat or other protein food on one-quarter of the plate, and a grain or starchy vegetable in the final quarter of the plate. To this you can add a small piece of fruit and 1 cup of milk or yogurt, depending on how many carbs you are supposed to eat at a meal. 
· Try to eat about the same amount of carbs at each meal. Do not \"save up\" your daily allowance of carbs to eat at one meal. 
· Proteins have very little or no carbs per serving. Examples of proteins are beef, chicken, turkey, fish, eggs, tofu, cheese, cottage cheese, and peanut butter. A serving size of meat is 3 ounces, which is about the size of a deck of cards. Examples of meat substitute serving sizes (equal to 1 ounce of meat) are 1/4 cup of cottage cheese, 1 egg, 1 tablespoon of peanut butter, and ½ cup of tofu. How can you eat out and still eat healthy? · Learn to estimate the serving sizes of foods that have carbohydrate. If you measure food at home, it will be easier to estimate the amount in a serving of restaurant food. · If the meal you order has too much carbohydrate (such as potatoes, corn, or baked beans), ask to have a low-carbohydrate food instead. Ask for a salad or green vegetables. · If you use insulin, check your blood sugar before and after eating out to help you plan how much to eat in the future. · If you eat more carbohydrate at a meal than you had planned, take a walk or do other exercise. This will help lower your blood sugar. What else should you know? · Limit saturated fat, such as the fat from meat and dairy products. This is a healthy choice because people who have diabetes are at higher risk of heart disease. So choose lean cuts of meat and nonfat or low-fat dairy products. Use olive or canola oil instead of butter or shortening when cooking. · Don't skip meals. Your blood sugar may drop too low if you skip meals and take insulin or certain medicines for diabetes. · Check with your doctor before you drink alcohol. Alcohol can cause your blood sugar to drop too low. Alcohol can also cause a bad reaction if you take certain diabetes medicines. Follow-up care is a key part of your treatment and safety. Be sure to make and go to all appointments, and call your doctor if you are having problems. It's also a good idea to know your test results and keep a list of the medicines you take. Where can you learn more? Go to http://alma-isabelle.info/. Enter Q966 in the search box to learn more about \"Learning About Diabetes Food Guidelines. \" Current as of: March 13, 2017 Content Version: 11.3 © 7454-7457 [employer]. Care instructions adapted under license by [employer] (which disclaims liability or warranty for this information).  If you have questions about a medical condition or this instruction, always ask your healthcare professional. [address]  any warranty or liability for your use of this information. Introducing [facility]! Dear Irina Nunez: 
Thank you for requesting a My [employer] account. Our records indicate that you already have an active My [employer] account. You can access your account anytime at [email or web address] Did you know that you can access your hospital and ER discharge instructions at any time in My [employer]? You can also review all of your test results from your hospital stay or ER visit. Additional Information If you have questions, please visit the Frequently Asked Questions section of the My [employer] website at [email or web address] Remember, My [employer] is NOT to be used for urgent needs. For medical emergencies, dial 911. Now available from your iPhone and Android! Please provide this summary of care documentation to your next provider. Your primary care clinician is listed as Stevie Mandel. If you have any questions after today's visit, please call [phone].

## 2017-07-12 LAB
25(OH)D3+25(OH)D2 SERPL-MCNC: 15.6 NG/ML (ref 30–100)
ALBUMIN SERPL-MCNC: 4 G/DL (ref 3.5–5.5)
ALBUMIN/GLOB SERPL: 1.2 {RATIO} (ref 1.2–2.2)
ALP SERPL-CCNC: 57 IU/L (ref 39–117)
ALT SERPL-CCNC: 25 IU/L (ref 0–44)
AST SERPL-CCNC: 32 IU/L (ref 0–40)
BILIRUB SERPL-MCNC: 0.4 MG/DL (ref 0–1.2)
BUN SERPL-MCNC: 17 MG/DL (ref 6–24)
BUN/CREAT SERPL: 12 (ref 9–20)
CALCIUM SERPL-MCNC: 9.2 MG/DL (ref 8.7–10.2)
CHLORIDE SERPL-SCNC: 97 MMOL/L (ref 96–106)
CHOLEST SERPL-MCNC: 146 MG/DL (ref 100–199)
CO2 SERPL-SCNC: 26 MMOL/L (ref 18–29)
CREAT SERPL-MCNC: 1.43 MG/DL (ref 0.76–1.27)
ERYTHROCYTE [DISTWIDTH] IN BLOOD BY AUTOMATED COUNT: 15.6 % (ref 12.3–15.4)
GLOBULIN SER CALC-MCNC: 3.4 G/DL (ref 1.5–4.5)
GLUCOSE SERPL-MCNC: 89 MG/DL (ref 65–99)
HCT VFR BLD AUTO: 43.2 % (ref 37.5–51)
HDLC SERPL-MCNC: 44 MG/DL
HGB BLD-MCNC: 13.8 G/DL (ref 12.6–17.7)
INTERPRETATION: NORMAL
LDLC SERPL CALC-MCNC: 83 MG/DL (ref 0–99)
Lab: NORMAL
MCH RBC QN AUTO: 26.6 PG (ref 26.6–33)
MCHC RBC AUTO-ENTMCNC: 31.9 G/DL (ref 31.5–35.7)
MCV RBC AUTO: 83 FL (ref 79–97)
PLATELET # BLD AUTO: 247 X10E3/UL (ref 150–379)
POTASSIUM SERPL-SCNC: 3.9 MMOL/L (ref 3.5–5.2)
PROT SERPL-MCNC: 7.4 G/DL (ref 6–8.5)
RBC # BLD AUTO: 5.18 X10E6/UL (ref 4.14–5.8)
SODIUM SERPL-SCNC: 139 MMOL/L (ref 134–144)
TRIGL SERPL-MCNC: 94 MG/DL (ref 0–149)
VLDLC SERPL CALC-MCNC: 19 MG/DL (ref 5–40)
WBC # BLD AUTO: 10.1 X10E3/UL (ref 3.4–10.8)

## 2017-08-03 DIAGNOSIS — I10 ESSENTIAL HYPERTENSION: ICD-10-CM

## 2017-08-07 DIAGNOSIS — R60.0 BILATERAL EDEMA OF LOWER EXTREMITY: ICD-10-CM

## 2017-08-07 DIAGNOSIS — I10 ESSENTIAL HYPERTENSION: ICD-10-CM

## 2017-08-07 RX ORDER — OLMESARTAN MEDOXOMIL, AMLODIPINE AND HYDROCHLOROTHIAZIDE TABLET 40/10/25 MG 40; 10; 25 MG/1; MG/1; MG/1
TABLET ORAL
Qty: 30 TAB | Refills: 5 | Status: SHIPPED | OUTPATIENT
Start: 2017-08-07 | End: 2017-11-20 | Stop reason: SDUPTHER

## 2017-08-07 RX ORDER — FUROSEMIDE 80 MG/1
TABLET ORAL
Qty: 30 TAB | Refills: 0 | Status: SHIPPED | OUTPATIENT
Start: 2017-08-07 | End: 2017-08-21 | Stop reason: SDUPTHER

## 2017-08-07 RX ORDER — OLMESARTAN MEDOXOMIL, AMLODIPINE AND HYDROCHLOROTHIAZIDE TABLET 40/10/25 MG 40; 10; 25 MG/1; MG/1; MG/1
TABLET ORAL
Qty: 30 TAB | Refills: 0 | Status: SHIPPED | OUTPATIENT
Start: 2017-08-07 | End: 2017-11-20 | Stop reason: SDUPTHER

## 2017-08-07 RX ORDER — EMPAGLIFLOZIN AND LINAGLIPTIN 25; 5 MG/1; MG/1
TABLET, FILM COATED ORAL
Qty: 30 TAB | Refills: 0 | Status: SHIPPED | OUTPATIENT
Start: 2017-08-07 | End: 2017-11-20 | Stop reason: SDUPTHER

## 2017-08-09 RX ORDER — EMPAGLIFLOZIN AND LINAGLIPTIN 25; 5 MG/1; MG/1
TABLET, FILM COATED ORAL
Qty: 30 TAB | Refills: 6 | Status: SHIPPED | OUTPATIENT
Start: 2017-08-09 | End: 2017-11-20 | Stop reason: SDUPTHER

## 2017-08-10 RX ORDER — EMPAGLIFLOZIN AND LINAGLIPTIN 25; 5 MG/1; MG/1
TABLET, FILM COATED ORAL
Qty: 30 TAB | Refills: 6 | Status: SHIPPED | OUTPATIENT
Start: 2017-08-10 | End: 2017-11-20 | Stop reason: SDUPTHER

## 2017-08-21 ENCOUNTER — OFFICE VISIT (OUTPATIENT)
Dept: FAMILY MEDICINE CLINIC | Age: 47
End: 2017-08-21

## 2017-08-21 VITALS
OXYGEN SATURATION: 97 % | RESPIRATION RATE: 18 BRPM | BODY MASS INDEX: 42.66 KG/M2 | TEMPERATURE: 96.3 F | DIASTOLIC BLOOD PRESSURE: 78 MMHG | WEIGHT: 315 LBS | HEART RATE: 69 BPM | HEIGHT: 72 IN | SYSTOLIC BLOOD PRESSURE: 134 MMHG

## 2017-08-21 DIAGNOSIS — R60.0 BILATERAL EDEMA OF LOWER EXTREMITY: ICD-10-CM

## 2017-08-21 DIAGNOSIS — I10 ESSENTIAL HYPERTENSION: Primary | ICD-10-CM

## 2017-08-21 DIAGNOSIS — M25.562 ACUTE PAIN OF LEFT KNEE: ICD-10-CM

## 2017-08-21 LAB — HBA1C MFR BLD HPLC: 8.3 %

## 2017-08-21 RX ORDER — DICLOFENAC SODIUM 10 MG/G
2 GEL TOPICAL 4 TIMES DAILY
Qty: 100 G | Refills: 2 | Status: SHIPPED | OUTPATIENT
Start: 2017-08-21

## 2017-08-21 RX ORDER — FUROSEMIDE 80 MG/1
40 TABLET ORAL DAILY
Qty: 30 TAB | Refills: 3 | Status: SHIPPED | OUTPATIENT
Start: 2017-08-21 | End: 2017-11-20 | Stop reason: ALTCHOICE

## 2017-08-21 NOTE — PROGRESS NOTES
HISTORY OF PRESENT ILLNESS  Kash Lopez is a 50 y.o. male. HPI   Patient present with b/lSwelling of the lower ext,   Stating that he does a lot of walking but unfortunately walking has not been helping the swelling to go away, on the pt last visits, he did have much of the legs swelling and his weight was also worse than today's weight,    Today the patient denies leg pain, denies rash, and legs lesion,no dizziness,  the legs B swelling not only better but also the wt went up by close to 10 pounds sinnce the April of 2017   Left knee pain  Was cutting the grass, stumble on the ditch, felt the pain, worsening nightly, has been 2 weeks, and takig  3-4 IbPrfn not helping  Current Outpatient Prescriptions   Medication Sig Dispense Refill    GLYXAMBI 25-5 mg tab TAKE ONE TABLET BY MOUTH ONCE DAILY 30 Tab 6    GLYXAMBI 25-5 mg tab TAKE ONE TABLET BY MOUTH ONCE DAILY 30 Tab 6    GLYXAMBI 25-5 mg tab TAKE ONE TABLET BY MOUTH ONCE DAILY 30 Tab 0    Olmesartan-amLODIPine-HCTZ 40-10-25 mg tab TAKE ONE TABLET BY MOUTH ONCE DAILY 30 Tab 0    Olmesartan-amLODIPine-HCTZ (TRIBENZOR) 40-10-25 mg tab TAKE ONE TABLET BY MOUTH EVERY DAY 30 Tab 5    furosemide (LASIX) 80 mg tablet TAKE ONE TABLET BY MOUTH ONCE DAILY 30 Tab 0    glipiZIDE (GLUCOTROL) 10 mg tablet Take 1.5 Tabs by mouth two (2) times a day. 90 Tab 4    empagliflozin-linagliptin (GLYXAMBI) 25-5 mg tab Take 1 Tab by mouth daily. 30 Tab 6    metOLazone (ZAROXOLYN) 5 mg tablet Take 1 Tab by mouth daily. 30 Tab 1    potassium chloride (K-DUR, KLOR-CON) 20 mEq tablet Take 1 Tab by mouth three (3) times daily. Indications: hypokalemia 90 Tab 2    albuterol (PROAIR HFA) 90 mcg/actuation inhaler Take 1 Puff by inhalation every six (6) hours as needed for Wheezing or Shortness of Breath. 1 Inhaler 11    metFORMIN (GLUCOPHAGE) 1,000 mg tablet Take 1 Tab by mouth two (2) times daily (with meals).  60 Tab 4    ketoconazole (NIZORAL) 2 % topical cream Apply  to affected area two (2) times a day. 60 g 6    betamethasone dipropionate (DIPROSONE) 0.05 % topical cream Apply  to affected area two (2) times a day. 15 g 0    aspirin 81 mg tablet Take 1 Tab by mouth daily. 30 Tab 11    glucose blood VI test strips (ASCENSIA AUTODISC VI, ONE TOUCH ULTRA TEST VI) strip by Does Not Apply route. As directed 1 Package 11    Lancets Misc by Does Not Apply route. As directed   1 Package 11    chlorpheniramine-HYDROcodone (TUSSIONEX) 10-8 mg/5 mL suspension Take 5 mL by mouth every twelve (12) hours as needed for Cough. Max Daily Amount: 10 mL. 120 mL 0    ergocalciferol (ERGOCALCIFEROL) 50,000 unit capsule TAKE 1 CAPSULE BY MOUTH EVERY 7 DAYS 4 Cap 0    tamsulosin (FLOMAX) 0.4 mg capsule Take 1 Cap by mouth daily. 30 Cap 0    fluticasone-salmeterol (ADVAIR) 250-50 mcg/dose diskus inhaler Take 1 Puff by inhalation every twelve (12) hours. 1 Inhaler 7     Allergies   Allergen Reactions    Wheat Hives     Past Medical History:   Diagnosis Date    Diabetes mellitus type II, uncontrolled ([address] 8/8/2014    Diabetic polyneuropathy associated with type 2 diabetes mellitus ([address] 11/22/2016    DM (diabetes mellitus) [facility] [facility] 6/15/2010    GERD (gastroesophageal reflux disease) 2/21/2014    Hypercholesterolemia 6/15/2010    Hypertension     Prostate infarction 3/28/2016    Screening for depression 3/30/2014    Tinea cruris 3/13/2014    Urinary frequency 3/28/2016     History reviewed. No pertinent surgical history.   Family History   Problem Relation Age of Onset    Lung Disease Mother     Hypertension Father     Heart Disease Father      Social History   Substance Use Topics    Smoking status: Never Smoker    Smokeless tobacco: Never Used    Alcohol use No      Lab Results  Component Value Date/Time   WBC 10.1 07/11/2017 10:47 AM   HGB 13.8 07/11/2017 10:47 AM   HCT 43.2 07/11/2017 10:47 AM   PLATELET 797 56/66/6008 10:47 AM   MCV 83 07/11/2017 10:47 AM     Lab Results  Component Value Date/Time   Hemoglobin A1c 8.6 08/08/2014 12:18 PM   Hemoglobin A1c 8.6 02/21/2014 10:04 AM   Hemoglobin A1c 8.3 07/20/2012 10:24 AM   Glucose 89 07/11/2017 10:47 AM   Glucose (POC) 292 06/15/2010 05:14 AM   Glucose POC 205mg/dl 06/15/2010 03:13 PM   Microalbumin/Creat ratio (mg/g creat) 117 06/15/2010 04:51 PM   Microalb/Creat ratio (ug/mg creat.) 113.8 04/11/2017 04:22 PM   Microalbumin,urine random 6.79 06/15/2010 04:51 PM   LDL, calculated 83 07/11/2017 10:47 AM   Creatinine 1.43 07/11/2017 10:47 AM      Lab Results  Component Value Date/Time   GFR est non-AA 57 07/11/2017 10:47 AM   GFR est AA 66 07/11/2017 10:47 AM   Creatinine 1.43 07/11/2017 10:47 AM   BUN 17 07/11/2017 10:47 AM   Sodium 139 07/11/2017 10:47 AM   Potassium 3.9 07/11/2017 10:47 AM   Chloride 97 07/11/2017 10:47 AM   CO2 26 07/11/2017 10:47 AM   Magnesium 1.8 06/14/2010 04:45 AM          Review of Systems   Constitutional: Negative for chills and fever. HENT: Negative for ear pain and nosebleeds. Eyes: Negative for blurred vision, pain and discharge. Respiratory: Negative for shortness of breath. Cardiovascular: Negative for chest pain and leg swelling. Gastrointestinal: Negative for constipation, diarrhea, nausea and vomiting. Genitourinary: Negative for frequency. Musculoskeletal: Positive for joint pain. Skin: Negative for itching and rash. Neurological: Negative for headaches. Psychiatric/Behavioral: Negative for depression. The patient is not nervous/anxious. Physical Exam   Constitutional: He is oriented to person, place, and time. He appears well-developed and well-nourished. HENT:   Head: Normocephalic and atraumatic. Mouth/Throat: No oropharyngeal exudate. Eyes: Conjunctivae and EOM are normal.   Neck: Normal range of motion. Neck supple. Cardiovascular: Normal rate, regular rhythm and normal heart sounds. No murmur heard.   Pulmonary/Chest: Effort normal and breath sounds normal. No respiratory distress. Abdominal: Soft. Bowel sounds are normal. He exhibits no distension. There is no rebound. Musculoskeletal: He exhibits tenderness. He exhibits no edema. Left knee: He exhibits decreased range of motion and swelling. Tenderness found. Neurological: He is alert and oriented to person, place, and time. Skin: Skin is warm. No erythema. Psychiatric: He has a normal mood and affect. His behavior is normal.   Nursing note and vitals reviewed. ASSESSMENT and PLAN  Diagnoses and all orders for this visit:    1. Essential hypertension  -     XR KNEE LT MAX 2 VWS; Future  -     METABOLIC PANEL, BASIC  -     AMB POC HEMOGLOBIN A1C    2. Bilateral edema of lower extremity  -     XR KNEE LT MAX 2 VWS; Future  -     METABOLIC PANEL, BASIC  -     furosemide (LASIX) 80 mg tablet; Take 0.5 Tabs by mouth daily. Indications: Edema  -     AMB POC HEMOGLOBIN A1C    3. Acute pain of left knee  -     XR KNEE LT MAX 2 VWS; Future  -     METABOLIC PANEL, BASIC  -     AMB POC HEMOGLOBIN A1C    Other orders  -     diclofenac (VOLTAREN) 1 % gel; Apply 2 g to affected area four (4) times daily.

## 2017-08-21 NOTE — MR AVS SNAPSHOT
Visit Information Date & Time Provider Department Dept. Phone Encounter #  
 8/21/2017 10:45 AM Marcos Rodriguez MD [address] [facility] [phone] [id number] Upcoming Health Maintenance Date Due  
 EYE EXAM RETINAL OR DILATED Q1 7/5/2017 INFLUENZA AGE 9 TO ADULT 8/1/2017 FOOT EXAM Q1 11/22/2017 HEMOGLOBIN A1C Q6M 1/11/2018 MICROALBUMIN Q1 4/11/2018 LIPID PANEL Q1 7/11/2018 DTaP/Tdap/Td series (2 - Td) 2/21/2024 Allergies as of 8/21/2017  Review Complete On: 8/21/2017 By: Katy Nugent LPN Severity Noted Reaction Type Reactions Wheat  01/12/2015    Hives Current Immunizations  Reviewed on 11/9/2015 Name Date Influenza Vaccine 11/1/2014 Influenza Vaccine PF  Deferred (Patient Refused) Influenza Vaccine Split 1/4/2012, 9/28/2010 Pneumococcal Polysaccharide (PPSV-23) 2/21/2014 Tdap 2/21/2014 Not reviewed this visit You Were Diagnosed With   
  
 Codes Comments Essential hypertension    -  Primary ICD-10-CM: I10 
ICD-9-CM: 401.9 Bilateral edema of lower extremity     ICD-10-CM: R60.0 ICD-9-CM: 348. 3 Acute pain of left knee     ICD-10-CM: M25.562 ICD-9-CM: 719.46 Vitals BP Pulse Temp Resp Height(growth percentile) Weight(growth percentile) 134/78 69 96.3 °F (35.7 °C) (Oral) 18 6' (1.829 m) (!) 397 lb 6.4 oz (180.3 kg) SpO2 BMI Smoking Status 97% 53.9 kg/m2 Never Smoker Vitals History BMI and BSA Data Body Mass Index Body Surface Area 53.9 kg/m 2 3.03 m 2 Preferred Pharmacy Pharmacy Name Phone [facility], [address] [place], [place] [address]y [address] Your Updated Medication List  
  
   
This list is accurate as of: 8/21/17 11:36 AM.  Always use your most recent med list.  
  
  
  
  
 albuterol 90 mcg/actuation inhaler Commonly known as:  PROAIR HFA  
 Take 1 Puff by inhalation every six (6) hours as needed for Wheezing or Shortness of Breath. aspirin 81 mg tablet Take 1 Tab by mouth daily. betamethasone dipropionate 0.05 % topical cream  
Commonly known as:  Yulia Marking Apply  to affected area two (2) times a day. chlorpheniramine-HYDROcodone 10-8 mg/5 mL suspension Commonly known as:  Kevin Sergio Take 5 mL by mouth every twelve (12) hours as needed for Cough. Max Daily Amount: 10 mL. diclofenac 1 % Gel Commonly known as:  VOLTAREN Apply 2 g to affected area four (4) times daily. * empagliflozin-linagliptin 25-5 mg Tab Commonly known as:  GLYXAMBI Take 1 Tab by mouth daily. * GLYXAMBI 25-5 mg Tab Generic drug:  empagliflozin-linagliptin TAKE ONE TABLET BY MOUTH ONCE DAILY  
  
 * GLYXAMBI 25-5 mg Tab Generic drug:  empagliflozin-linagliptin TAKE ONE TABLET BY MOUTH ONCE DAILY  
  
 * GLYXAMBI 25-5 mg Tab Generic drug:  empagliflozin-linagliptin TAKE ONE TABLET BY MOUTH ONCE DAILY  
  
 ergocalciferol 50,000 unit capsule Commonly known as:  ERGOCALCIFEROL  
TAKE 1 CAPSULE BY MOUTH EVERY 7 DAYS  
  
 fluticasone-salmeterol 250-50 mcg/dose diskus inhaler Commonly known as:  ADVAIR Take 1 Puff by inhalation every twelve (12) hours. furosemide 80 mg tablet Commonly known as:  LASIX Take 0.5 Tabs by mouth daily. Indications: Edema  
  
 glipiZIDE 10 mg tablet Commonly known as:  Srinath Huger Take 1.5 Tabs by mouth two (2) times a day. glucose blood VI test strips strip Commonly known as:  ASCENSIA AUTODISC VI, ONE TOUCH ULTRA TEST VI  
by Does Not Apply route. As directed  
  
 ketoconazole 2 % topical cream  
Commonly known as:  NIZORAL Apply  to affected area two (2) times a day. Lancets Misc  
by Does Not Apply route. As directed  
  
 metFORMIN 1,000 mg tablet Commonly known as:  GLUCOPHAGE Take 1 Tab by mouth two (2) times daily (with meals). metOLazone 5 mg tablet Commonly known as:  Katty Izquierdoen Take 1 Tab by mouth daily. * Olmesartan-amLODIPine-HCTZ 40-10-25 mg Tab TAKE ONE TABLET BY MOUTH ONCE DAILY  
  
 * Olmesartan-amLODIPine-HCTZ 40-10-25 mg Tab Commonly known as:  TRIBENZOR  
TAKE ONE TABLET BY MOUTH EVERY DAY  
  
 potassium chloride 20 mEq tablet Commonly known as:  K-DUR, KLOR-CON Take 1 Tab by mouth three (3) times daily. Indications: hypokalemia  
  
 tamsulosin 0.4 mg capsule Commonly known as:  FLOMAX Take 1 Cap by mouth daily. * Notice: This list has 6 medication(s) that are the same as other medications prescribed for you. Read the directions carefully, and ask your doctor or other care provider to review them with you. Prescriptions Sent to Pharmacy Refills  
 diclofenac (VOLTAREN) 1 % gel 2 Sig: Apply 2 g to affected area four (4) times daily. Class: Normal  
 Pharmacy: [address] [place] Ph #: [phone] Route: Topical  
 furosemide (LASIX) 80 mg tablet 3 Sig: Take 0.5 Tabs by mouth daily. Indications: Edema Class: Normal  
 Pharmacy: [address] [place] [place] Ph #: [phone] Route: Oral  
  
We Performed the Following AMB POC HEMOGLOBIN A1C [12472 CPT(R)] METABOLIC PANEL, BASIC [03560 CPT(R)] To-Do List   
 08/21/2017 Imaging:  XR KNEE LT MAX 2 VWS Introducing [facility]! Dear Tae Green: 
Thank you for requesting a [employer] account. Our records indicate that you already have an active [employer] account. You can access your account anytime at [email or web address] Did you know that you can access your hospital and ER discharge instructions at any time in [employer]? You can also review all of your test results from your hospital stay or ER visit. Additional Information If you have questions, please visit the Frequently Asked Questions section of the [employer] website at [email or web address] Remember, [employer] is NOT to be used for urgent needs. For medical emergencies, dial 911. Now available from your iPhone and Android! Please provide this summary of care documentation to your next provider. Your primary care clinician is listed as Tito Cazares. If you have any questions after today's visit, please call [phone].

## 2017-08-22 LAB
BUN SERPL-MCNC: 28 MG/DL (ref 6–24)
BUN/CREAT SERPL: 16 (ref 9–20)
CALCIUM SERPL-MCNC: 9.4 MG/DL (ref 8.7–10.2)
CHLORIDE SERPL-SCNC: 91 MMOL/L (ref 96–106)
CO2 SERPL-SCNC: 29 MMOL/L (ref 18–29)
CREAT SERPL-MCNC: 1.75 MG/DL (ref 0.76–1.27)
GLUCOSE SERPL-MCNC: 159 MG/DL (ref 65–99)
INTERPRETATION: NORMAL
POTASSIUM SERPL-SCNC: 4.1 MMOL/L (ref 3.5–5.2)
SODIUM SERPL-SCNC: 137 MMOL/L (ref 134–144)

## 2017-09-05 DIAGNOSIS — R60.0 BILATERAL EDEMA OF LOWER EXTREMITY: ICD-10-CM

## 2017-09-05 DIAGNOSIS — E78.5 HYPERLIPIDEMIA: ICD-10-CM

## 2017-09-05 DIAGNOSIS — I10 ESSENTIAL HYPERTENSION: ICD-10-CM

## 2017-09-05 DIAGNOSIS — E03.8 TSH (THYROID-STIMULATING HORMONE DEFICIENCY): ICD-10-CM

## 2017-09-05 DIAGNOSIS — E55.9 VITAMIN D DEFICIENCY: ICD-10-CM

## 2017-09-07 RX ORDER — FUROSEMIDE 80 MG/1
TABLET ORAL
Qty: 30 TAB | Refills: 0 | Status: SHIPPED | OUTPATIENT
Start: 2017-09-07 | End: 2017-11-20 | Stop reason: SDUPTHER

## 2017-09-07 RX ORDER — METOLAZONE 5 MG/1
TABLET ORAL
Qty: 30 TAB | Refills: 1 | Status: SHIPPED | OUTPATIENT
Start: 2017-09-07 | End: 2017-09-08 | Stop reason: SDUPTHER

## 2017-09-07 RX ORDER — METFORMIN HYDROCHLORIDE 1000 MG/1
TABLET ORAL
Qty: 60 TAB | Refills: 4 | Status: SHIPPED | OUTPATIENT
Start: 2017-09-07 | End: 2017-11-20 | Stop reason: SDUPTHER

## 2017-09-11 RX ORDER — METOLAZONE 5 MG/1
TABLET ORAL
Qty: 30 TAB | Refills: 1 | Status: SHIPPED | OUTPATIENT
Start: 2017-09-11 | End: 2017-09-12 | Stop reason: SDUPTHER

## 2017-09-13 RX ORDER — METOLAZONE 5 MG/1
TABLET ORAL
Qty: 30 TAB | Refills: 3 | Status: SHIPPED | OUTPATIENT
Start: 2017-09-13 | End: 2017-11-20 | Stop reason: SDUPTHER

## 2017-09-25 DIAGNOSIS — E55.9 VITAMIN D DEFICIENCY: ICD-10-CM

## 2017-09-25 DIAGNOSIS — I10 ESSENTIAL HYPERTENSION: ICD-10-CM

## 2017-09-25 DIAGNOSIS — E78.5 HYPERLIPIDEMIA: ICD-10-CM

## 2017-09-25 DIAGNOSIS — E03.8 TSH (THYROID-STIMULATING HORMONE DEFICIENCY): ICD-10-CM

## 2017-09-25 RX ORDER — METFORMIN HYDROCHLORIDE 1000 MG/1
TABLET ORAL
Qty: 60 TAB | Refills: 4 | Status: SHIPPED | OUTPATIENT
Start: 2017-09-25 | End: 2017-11-20 | Stop reason: SDUPTHER

## 2017-10-11 RX ORDER — GLIPIZIDE 10 MG/1
TABLET ORAL
Qty: 90 TAB | Refills: 4 | Status: SHIPPED | OUTPATIENT
Start: 2017-10-11 | End: 2017-10-13 | Stop reason: SDUPTHER

## 2017-10-15 RX ORDER — GLIPIZIDE 10 MG/1
TABLET ORAL
Qty: 90 TAB | Refills: 4 | Status: SHIPPED | OUTPATIENT
Start: 2017-10-15 | End: 2017-10-17 | Stop reason: SDUPTHER

## 2017-10-24 RX ORDER — GLIPIZIDE 10 MG/1
TABLET ORAL
Qty: 90 TAB | Refills: 4 | Status: SHIPPED | OUTPATIENT
Start: 2017-10-24 | End: 2017-10-24 | Stop reason: SDUPTHER

## 2017-10-30 RX ORDER — GLIPIZIDE 10 MG/1
TABLET ORAL
Qty: 90 TAB | Refills: 4 | Status: SHIPPED | OUTPATIENT
Start: 2017-10-30 | End: 2017-10-30 | Stop reason: SDUPTHER

## 2017-10-31 RX ORDER — GLIPIZIDE 10 MG/1
TABLET ORAL
Qty: 90 TAB | Refills: 4 | Status: SHIPPED | OUTPATIENT
Start: 2017-10-31 | End: 2017-11-20 | Stop reason: SDUPTHER

## 2017-11-20 ENCOUNTER — OFFICE VISIT (OUTPATIENT)
Dept: FAMILY MEDICINE CLINIC | Age: 47
End: 2017-11-20

## 2017-11-20 VITALS
TEMPERATURE: 97.6 F | HEIGHT: 72 IN | HEART RATE: 54 BPM | WEIGHT: 315 LBS | OXYGEN SATURATION: 95 % | RESPIRATION RATE: 14 BRPM | DIASTOLIC BLOOD PRESSURE: 80 MMHG | SYSTOLIC BLOOD PRESSURE: 123 MMHG | BODY MASS INDEX: 42.66 KG/M2

## 2017-11-20 DIAGNOSIS — E11.9 DIABETES MELLITUS WITHOUT COMPLICATION (HCC): Primary | ICD-10-CM

## 2017-11-20 DIAGNOSIS — R60.0 BILATERAL EDEMA OF LOWER EXTREMITY: ICD-10-CM

## 2017-11-20 DIAGNOSIS — Z23 ENCOUNTER FOR IMMUNIZATION: ICD-10-CM

## 2017-11-20 DIAGNOSIS — I10 ESSENTIAL HYPERTENSION: ICD-10-CM

## 2017-11-20 DIAGNOSIS — E03.8 TSH (THYROID-STIMULATING HORMONE DEFICIENCY): ICD-10-CM

## 2017-11-20 DIAGNOSIS — E55.9 VITAMIN D DEFICIENCY: ICD-10-CM

## 2017-11-20 DIAGNOSIS — E78.2 MIXED HYPERLIPIDEMIA: ICD-10-CM

## 2017-11-20 LAB — HBA1C MFR BLD HPLC: 8.8 %

## 2017-11-20 RX ORDER — METOLAZONE 5 MG/1
TABLET ORAL
Qty: 90 TAB | Refills: 3 | Status: SHIPPED | OUTPATIENT
Start: 2017-11-20 | End: 2019-02-13 | Stop reason: SDUPTHER

## 2017-11-20 RX ORDER — OLMESARTAN MEDOXOMIL, AMLODIPINE AND HYDROCHLOROTHIAZIDE TABLET 40/10/25 MG 40; 10; 25 MG/1; MG/1; MG/1
TABLET ORAL
Qty: 90 TAB | Refills: 3 | Status: SHIPPED | OUTPATIENT
Start: 2017-11-20 | End: 2018-03-13 | Stop reason: SDUPTHER

## 2017-11-20 RX ORDER — ERGOCALCIFEROL 1.25 MG/1
CAPSULE ORAL
Qty: 12 CAP | Refills: 6 | Status: SHIPPED | OUTPATIENT
Start: 2017-11-20 | End: 2019-01-07 | Stop reason: SDUPTHER

## 2017-11-20 RX ORDER — METFORMIN HYDROCHLORIDE 1000 MG/1
TABLET ORAL
Qty: 180 TAB | Refills: 4 | Status: SHIPPED | OUTPATIENT
Start: 2017-11-20 | End: 2019-01-07 | Stop reason: SDUPTHER

## 2017-11-20 RX ORDER — NAPROXEN 500 MG/1
TABLET ORAL
COMMUNITY
Start: 2017-10-19 | End: 2017-11-20 | Stop reason: ALTCHOICE

## 2017-11-20 RX ORDER — GLIPIZIDE 10 MG/1
TABLET ORAL
Qty: 270 TAB | Refills: 4 | Status: SHIPPED | OUTPATIENT
Start: 2017-11-20 | End: 2019-01-07 | Stop reason: SDUPTHER

## 2017-11-20 RX ORDER — POTASSIUM CHLORIDE 20 MEQ/1
20 TABLET, EXTENDED RELEASE ORAL 3 TIMES DAILY
Qty: 270 TAB | Refills: 2 | Status: SHIPPED | OUTPATIENT
Start: 2017-11-20 | End: 2019-01-07 | Stop reason: SDUPTHER

## 2017-11-20 NOTE — PROGRESS NOTES
Name and  verified        Chief Complaint   Patient presents with    Diabetes         Health Maintenance reviewed-discussed with patient. Patient educated on the parts of a diabetes care plan  1. Meal plan  2. Plan for staying active  3. Diabetes medicine plan  4. Plan for checking blood sugar as ordered by Dr. Beryle Clam  5.  Schedule for diabetes follow up appt as recommended by provider

## 2017-11-20 NOTE — PATIENT INSTRUCTIONS
Vaccine Information Statement    Influenza (Flu) Vaccine (Inactivated or Recombinant): What you need to know    Many Vaccine Information Statements are available in German and other languages. See www.immunize.org/vis  Hojas de Información Sobre Vacunas están disponibles en Español y en muchos otros idiomas. Visite www.immunize.org/vis    1. Why get vaccinated? Influenza (flu) is a contagious disease that spreads around the [place] every year, usually between October and May. Flu is caused by influenza viruses, and is spread mainly by coughing, sneezing, and close contact. Anyone can get flu. Flu strikes suddenly and can last several days. Symptoms vary by age, but can include:   fever/chills   sore throat   muscle aches   fatigue   cough   headache    runny or stuffy nose    Flu can also lead to pneumonia and blood infections, and cause diarrhea and seizures in children. If you have a medical condition, such as heart or lung disease, flu can make it worse. Flu is more dangerous for some people. Infants and young children, people 72years of age and older, pregnant women, and people with certain health conditions or a weakened immune system are at greatest risk. Each year thousands of people in the [facility] die from flu, and many more are hospitalized. Flu vaccine can:   keep you from getting flu,   make flu less severe if you do get it, and   keep you from spreading flu to your family and other people. 2. Inactivated and recombinant flu vaccines    A dose of flu vaccine is recommended every flu season. Children 6 months through 6years of age may need two doses during the same flu season. Everyone else needs only one dose each flu season.        Some inactivated flu vaccines contain a very small amount of a mercury-based preservative called thimerosal. Studies have not shown thimerosal in vaccines to be harmful, but flu vaccines that do not contain thimerosal are available. There is no live flu virus in flu shots. They cannot cause the flu. There are many flu viruses, and they are always changing. Each year a new flu vaccine is made to protect against three or four viruses that are likely to cause disease in the upcoming flu season. But even when the vaccine doesnt exactly match these viruses, it may still provide some protection    Flu vaccine cannot prevent:   flu that is caused by a virus not covered by the vaccine, or   illnesses that look like flu but are not. It takes about 2 weeks for protection to develop after vaccination, and protection lasts through the flu season. 3. Some people should not get this vaccine    Tell the person who is giving you the vaccine:     If you have any severe, life-threatening allergies. If you ever had a life-threatening allergic reaction after a dose of flu vaccine, or have a severe allergy to any part of this vaccine, you may be advised not to get vaccinated. Most, but not all, types of flu vaccine contain a small amount of egg protein.  If you ever had Guillain-Barré Syndrome (also called GBS). Some people with a history of GBS should not get this vaccine. This should be discussed with your doctor.  If you are not feeling well. It is usually okay to get flu vaccine when you have a mild illness, but you might be asked to come back when you feel better. 4. Risks of a vaccine reaction    With any medicine, including vaccines, there is a chance of reactions. These are usually mild and go away on their own, but serious reactions are also possible. Most people who get a flu shot do not have any problems with it.      Minor problems following a flu shot include:    soreness, redness, or swelling where the shot was given     hoarseness   sore, red or itchy eyes   cough   fever   aches   headache   itching   fatigue  If these problems occur, they usually begin soon after the shot and last 1 or 2 days. More serious problems following a flu shot can include the following:     There may be a small increased risk of Guillain-Barré Syndrome (GBS) after inactivated flu vaccine. This risk has been estimated at 1 or 2 additional cases per million people vaccinated. This is much lower than the risk of severe complications from flu, which can be prevented by flu vaccine.  Young children who get the flu shot along with pneumococcal vaccine (PCV13) and/or DTaP vaccine at the same time might be slightly more likely to have a seizure caused by fever. Ask your doctor for more information. Tell your doctor if a child who is getting flu vaccine has ever had a seizure. Problems that could happen after any injected vaccine:      People sometimes faint after a medical procedure, including vaccination. Sitting or lying down for about 15 minutes can help prevent fainting, and injuries caused by a fall. Tell your doctor if you feel dizzy, or have vision changes or ringing in the ears.  Some people get severe pain in the shoulder and have difficulty moving the arm where a shot was given. This happens very rarely.  Any medication can cause a severe allergic reaction. Such reactions from a vaccine are very rare, estimated at about 1 in a million doses, and would happen within a few minutes to a few hours after the vaccination. As with any medicine, there is a very remote chance of a vaccine causing a serious injury or death. The safety of vaccines is always being monitored. For more information, visit: www.cdc.gov/vaccinesafety/    5. What if there is a serious reaction? What should I look for?  Look for anything that concerns you, such as signs of a severe allergic reaction, very high fever, or unusual behavior.     Signs of a severe allergic reaction can include hives, swelling of the face and throat, difficulty breathing, a fast heartbeat, dizziness, and weakness  usually within a few minutes to a few hours after the vaccination. What should I do?  If you think it is a severe allergic reaction or other emergency that cant wait, call 9-1-1 and get the person to the nearest hospital. Otherwise, call your doctor.  Reactions should be reported to the Vaccine Adverse Event Reporting System (VAERS). Your doctor should file this report, or you can do it yourself through  the VAERS web site at www.vaers. [facility].gov, or by calling 6-734.846.3431. VAERS does not give medical advice. 6. The National Vaccine Injury Compensation Program    The [facility] Vaccine Injury Compensation Program (VICP) is a federal program that was created to compensate people who may have been injured by certain vaccines. Persons who believe they may have been injured by a vaccine can learn about the program and about filing a claim by calling 4-807.108.7672 or visiting the [employer] website at www.[facility].gov/vaccinecompensation. There is a time limit to file a claim for compensation. 7. How can I learn more?  Ask your healthcare provider. He or she can give you the vaccine package insert or suggest other sources of information.  Call your local or state health department.  Contact the Centers for Disease Control and Prevention (CDC):  - Call 2-285.152.8120 (1-800-CDC-INFO) or  - Visit CDCs website at www.cdc.gov/flu    Vaccine Information Statement   Inactivated Influenza Vaccine   8/7/2015  42 MATHEUS Yaniv Arian 113OL-71    Department of Health and Human Services  Centers for Disease Control and Prevention    Office Use Only

## 2017-11-20 NOTE — PROGRESS NOTES
Order placed for flu vaccine  per Verbal Order from Dr. Jose L Ureña on 11/20/2017 due to need for flu vaccine    Flu vaccination given to right deltoid. Tolerated well, no reaction noted.

## 2017-11-20 NOTE — MR AVS SNAPSHOT
Visit Information Date & Time Provider Department Dept. Phone Encounter #  
 11/20/2017 10:30 AM Ulysess Angelucci, MD [facility] OFFICE-ANNEX [phone] [id number] Upcoming Health Maintenance Date Due  
 EYE EXAM RETINAL OR DILATED Q1 7/5/2017 Influenza Age 5 to Adult 8/1/2017 FOOT EXAM Q1 11/22/2017 HEMOGLOBIN A1C Q6M 2/21/2018 MICROALBUMIN Q1 4/11/2018 LIPID PANEL Q1 7/11/2018 DTaP/Tdap/Td series (2 - Td) 2/21/2024 Allergies as of 11/20/2017  Review Complete On: 11/20/2017 By: Blanco Fregoso LPN Severity Noted Reaction Type Reactions Wheat  01/12/2015    Hives Current Immunizations  Reviewed on 11/9/2015 Name Date Influenza Vaccine 11/1/2014 Influenza Vaccine PF  Deferred (Patient Refused) Influenza Vaccine Split 1/4/2012, 9/28/2010 Pneumococcal Polysaccharide (PPSV-23) 2/21/2014 Tdap 2/21/2014 Not reviewed this visit You Were Diagnosed With   
  
 Codes Comments Diabetes mellitus without complication ([facility]    -  Primary ICD-10-CM: E11.9 ICD-9-CM: 250.00 BMI 50.0-59.9, adult [facility])     ICD-10-CM: I94.56 
ICD-9-CM: V85.43 Uncontrolled type 2 diabetes mellitus without complication, without long-term current use of insulin ([facility]     ICD-10-CM: E11.65 ICD-9-CM: 250.02 Vitamin D deficiency     ICD-10-CM: E55.9 ICD-9-CM: 268.9 TSH (thyroid-stimulating hormone deficiency)     ICD-10-CM: E03.8 ICD-9-CM: 244.8 Mixed hyperlipidemia     ICD-10-CM: E78.2 ICD-9-CM: 272.2 Essential hypertension     ICD-10-CM: I10 
ICD-9-CM: 401.9 Bilateral edema of lower extremity     ICD-10-CM: R60.0 ICD-9-CM: 298. 3 Vitals BP Pulse Temp Resp Height(growth percentile) Weight(growth percentile) 123/80 (BP 1 Location: Left arm, BP Patient Position: At rest) (!) 54 97.6 °F (36.4 °C) (Oral) 14 6' (1.829 m) (!) 390 lb 3.2 oz (177 kg) SpO2 BMI Smoking Status 95% 52.92 kg/m2 Never Smoker Vitals History BMI and BSA Data Body Mass Index Body Surface Area 52.92 kg/m 2 3 m 2 Preferred Pharmacy Pharmacy Name [facility] [id number], [address] Your Updated Medication List  
  
   
This list is accurate as of: 11/20/17 12:38 PM.  Always use your most recent med list.  
  
  
  
  
 albuterol 90 mcg/actuation inhaler Commonly known as:  PROAIR HFA Take 1 Puff by inhalation every six (6) hours as needed for Wheezing or Shortness of Breath. aspirin 81 mg tablet Take 1 Tab by mouth daily. betamethasone dipropionate 0.05 % topical cream  
Commonly known as:  Stevie Combe Apply  to affected area two (2) times a day. diclofenac 1 % Gel Commonly known as:  VOLTAREN Apply 2 g to affected area four (4) times daily. empagliflozin-linagliptin 25-5 mg Tab Commonly known as:  GLYXAMBI Take 1 Tab by mouth daily. ergocalciferol 50,000 unit capsule Commonly known as:  ERGOCALCIFEROL  
TAKE 1 CAPSULE BY MOUTH EVERY 7 DAYS  Indications: hypoparathyroidism  
  
 glipiZIDE 10 mg tablet Commonly known as:  GLUCOTROL  
TAKE ONE & ONE-HALF TABLETS BY MOUTH TWICE DAILY  
  
 glucose blood VI test strips strip Commonly known as:  ASCENSIA AUTODISC VI, ONE TOUCH ULTRA TEST VI  
by Does Not Apply route. As directed  
  
 ketoconazole 2 % topical cream  
Commonly known as:  NIZORAL Apply  to affected area two (2) times a day. Lancets Misc  
by Does Not Apply route. As directed  
  
 metFORMIN 1,000 mg tablet Commonly known as:  GLUCOPHAGE  
TAKE ONE TABLET BY MOUTH TWICE DAILY WITH MEALS  
  
 metOLazone 5 mg tablet Commonly known as:  ZAROXOLYN  
TAKE ONE TABLET BY MOUTH ONCE DAILY Olmesartan-amLODIPine-HCTZ 40-10-25 mg Tab Commonly known as:  TRIBENZOR  
TAKE ONE TABLET BY MOUTH EVERY DAY  
  
 potassium chloride 20 mEq tablet Commonly known as:  K-DUR, KLOR-CON Take 1 Tab by mouth three (3) times daily. Indications: hypokalemia Prescriptions Sent to Pharmacy Refills  
 ergocalciferol (ERGOCALCIFEROL) 50,000 unit capsule 6 Sig: TAKE 1 CAPSULE BY MOUTH EVERY 7 DAYS  Indications: hypoparathyroidism Class: Normal  
 Pharmacy: [address], [facility] Ph #: [phone]  
 empagliflozin-linagliptin (GLYXAMBI) 25-5 mg tab 6 Sig: Take 1 Tab by mouth daily. Class: Normal  
 Pharmacy: [address], [place] Ph #: [phone] Route: Oral  
 Olmesartan-amLODIPine-HCTZ (TRIBENZOR) 40-10-25 mg tab 3 Sig: TAKE ONE TABLET BY MOUTH EVERY DAY Class: Normal  
 Pharmacy: [address], [facility] Ph #: [phone]  
 glipiZIDE (GLUCOTROL) 10 mg tablet 4 Sig: TAKE ONE & ONE-HALF TABLETS BY MOUTH TWICE DAILY Class: Normal  
 Pharmacy: [address], [facility] Ph #: [phone]  
 metOLazone (ZAROXOLYN) 5 mg tablet 3 Sig: TAKE ONE TABLET BY MOUTH ONCE DAILY Class: Normal  
 Pharmacy: [address], [facility] Ph #: [phone]  
 metFORMIN (GLUCOPHAGE) 1,000 mg tablet 4 Sig: TAKE ONE TABLET BY MOUTH TWICE DAILY WITH MEALS Class: Normal  
 Pharmacy: [address], [facility] Ph #: [phone]  
 potassium chloride (K-DUR, KLOR-CON) 20 mEq tablet 2 Sig: Take 1 Tab by mouth three (3) times daily. Indications: hypokalemia Class: Normal  
 Pharmacy: [address], [facility] Ph #: [phone] Route: Oral  
  
We Performed the Following AMB POC HEMOGLOBIN A1C [93480 CPT(R)] CBC W/O DIFF [08535 CPT(R)] HEMOGLOBIN A1C WITH EAG [54048 CPT(R)]  DIABETES FOOT EXAM [HM7 Custom] METABOLIC PANEL, COMPREHENSIVE [36634 CPT(R)] Introducing [facility] SERVICES! Dear Lidya Ortega: 
Thank you for requesting a [employer] account. Our records indicate that you already have an active The[employer] account. You can access your account anytime at [email or web address] Did you know that you can access your hospital and ER discharge instructions at any time in [employer]? You can also review all of your test results from your hospital stay or ER visit. Additional Information If you have questions, please visit the Frequently Asked Questions section of the [employer] website at [email or web address] Remember, TheRouteBox is NOT to be used for urgent needs. For medical emergencies, dial 911. Now available from your iPhone and Android! Please provide this summary of care documentation to your next provider. Your primary care clinician is listed as Rahat Artis. If you have any questions after today's visit, please call [phone].

## 2017-11-21 LAB
ALBUMIN SERPL-MCNC: 4.2 G/DL (ref 3.5–5.5)
ALBUMIN/GLOB SERPL: 1.3 {RATIO} (ref 1.2–2.2)
ALP SERPL-CCNC: 59 IU/L (ref 39–117)
ALT SERPL-CCNC: 19 IU/L (ref 0–44)
AST SERPL-CCNC: 22 IU/L (ref 0–40)
BILIRUB SERPL-MCNC: 0.5 MG/DL (ref 0–1.2)
BUN SERPL-MCNC: 33 MG/DL (ref 6–24)
BUN/CREAT SERPL: 19 (ref 9–20)
CALCIUM SERPL-MCNC: 9.1 MG/DL (ref 8.7–10.2)
CHLORIDE SERPL-SCNC: 92 MMOL/L (ref 96–106)
CO2 SERPL-SCNC: 27 MMOL/L (ref 18–29)
CREAT SERPL-MCNC: 1.7 MG/DL (ref 0.76–1.27)
ERYTHROCYTE [DISTWIDTH] IN BLOOD BY AUTOMATED COUNT: 16.4 % (ref 12.3–15.4)
GFR SERPLBLD CREATININE-BSD FMLA CKD-EPI: 46 ML/MIN/1.73
GFR SERPLBLD CREATININE-BSD FMLA CKD-EPI: 54 ML/MIN/1.73
GLOBULIN SER CALC-MCNC: 3.3 G/DL (ref 1.5–4.5)
GLUCOSE SERPL-MCNC: 135 MG/DL (ref 65–99)
HCT VFR BLD AUTO: 44.9 % (ref 37.5–51)
HGB BLD-MCNC: 15 G/DL (ref 12.6–17.7)
INTERPRETATION: NORMAL
Lab: NORMAL
MCH RBC QN AUTO: 26.7 PG (ref 26.6–33)
MCHC RBC AUTO-ENTMCNC: 33.4 G/DL (ref 31.5–35.7)
MCV RBC AUTO: 80 FL (ref 79–97)
PLATELET # BLD AUTO: 270 X10E3/UL (ref 150–379)
POTASSIUM SERPL-SCNC: 3.4 MMOL/L (ref 3.5–5.2)
PROT SERPL-MCNC: 7.5 G/DL (ref 6–8.5)
RBC # BLD AUTO: 5.62 X10E6/UL (ref 4.14–5.8)
SODIUM SERPL-SCNC: 138 MMOL/L (ref 134–144)
WBC # BLD AUTO: 9.7 X10E3/UL (ref 3.4–10.8)

## 2017-11-21 NOTE — PROGRESS NOTES
HISTORY OF PRESENT ILLNESS  Ramirez Beaver is a 50 y.o. male. HPI   DMtype II    Compliant w/ meds,   Pt refusing to go on Inj thearpy for themost  At this jie, having nodiabetic diet, and not doing much of daily exercise, obtains home glucose monitoring averaging  140's  . No Rf needed for today. Denies any tingling sensation, polyurea and polydipsia, last a1c was not at target 8.8%%    . Last podiatry visit 2017   And last eye exam was 2017  Last urine microalbumin 2017 and was normal  . Feeling better since the last visit. Current Outpatient Prescriptions   Medication Sig Dispense Refill    ergocalciferol (ERGOCALCIFEROL) 50,000 unit capsule TAKE 1 CAPSULE BY MOUTH EVERY 7 DAYS  Indications: hypoparathyroidism 12 Cap 6    empagliflozin-linagliptin (GLYXAMBI) 25-5 mg tab Take 1 Tab by mouth daily. 90 Tab 6    Olmesartan-amLODIPine-HCTZ (TRIBENZOR) 40-10-25 mg tab TAKE ONE TABLET BY MOUTH EVERY DAY 90 Tab 3    glipiZIDE (GLUCOTROL) 10 mg tablet TAKE ONE & ONE-HALF TABLETS BY MOUTH TWICE DAILY 270 Tab 4    metOLazone (ZAROXOLYN) 5 mg tablet TAKE ONE TABLET BY MOUTH ONCE DAILY 90 Tab 3    metFORMIN (GLUCOPHAGE) 1,000 mg tablet TAKE ONE TABLET BY MOUTH TWICE DAILY WITH MEALS 180 Tab 4    potassium chloride (K-DUR, KLOR-CON) 20 mEq tablet Take 1 Tab by mouth three (3) times daily. Indications: hypokalemia 270 Tab 2    diclofenac (VOLTAREN) 1 % gel Apply 2 g to affected area four (4) times daily. 100 g 2    albuterol (PROAIR HFA) 90 mcg/actuation inhaler Take 1 Puff by inhalation every six (6) hours as needed for Wheezing or Shortness of Breath. 1 Inhaler 11    ketoconazole (NIZORAL) 2 % topical cream Apply  to affected area two (2) times a day. 60 g 6    betamethasone dipropionate (DIPROSONE) 0.05 % topical cream Apply  to affected area two (2) times a day. 15 g 0    aspirin 81 mg tablet Take 1 Tab by mouth daily.  30 Tab 11    glucose blood VI test strips (ASCENSIA AUTODISC VI, ONE TOUCH ULTRA TEST VI) strip by Does Not Apply route. As directed 1 Package 11    Lancets Misc by Does Not Apply route. As directed   1 Package 11     Allergies   Allergen Reactions    Wheat Hives     Past Medical History:   Diagnosis Date    Acute pain of left knee 8/21/2017    Acute pain of left knee 8/21/2017    Diabetes mellitus type II, uncontrolled ([facility] 8/8/2014    Diabetic polyneuropathy associated with type 2 diabetes mellitus ([facility] 11/22/2016    DM (diabetes mellitus) ([facility] 6/15/2010    GERD (gastroesophageal reflux disease) 2/21/2014    Hypercholesterolemia 6/15/2010    Hypertension     Prostate infarction 3/28/2016    Screening for depression 3/30/2014    Tinea cruris 3/13/2014    Urinary frequency 3/28/2016     History reviewed. No pertinent surgical history.   Family History   Problem Relation Age of Onset    Lung Disease Mother     Hypertension Father     Heart Disease Father      Social History   Substance Use Topics    Smoking status: Never Smoker    Smokeless tobacco: Never Used    Alcohol use No      Lab Results  Component Value Date/Time   WBC 9.7 11/20/2017 12:52 PM   HGB 15.0 11/20/2017 12:52 PM   HCT 44.9 11/20/2017 12:52 PM   PLATELET 497 34/79/6770 12:52 PM   MCV 80 11/20/2017 12:52 PM     Lab Results  Component Value Date/Time   Hemoglobin A1c 8.6 08/08/2014 12:18 PM   Hemoglobin A1c 8.6 02/21/2014 10:04 AM   Hemoglobin A1c 8.3 07/20/2012 10:24 AM   Glucose 135 11/20/2017 12:52 PM   Glucose (POC) 292 06/15/2010 05:14 AM   Glucose POC 205mg/dl 06/15/2010 03:13 PM   Microalbumin/Creat ratio (mg/g creat) 117 06/15/2010 04:51 PM   Microalb/Creat ratio (ug/mg creat.) 113.8 04/11/2017 04:22 PM   Microalbumin,urine random 6.79 06/15/2010 04:51 PM   LDL, calculated 83 07/11/2017 10:47 AM   Creatinine 1.70 11/20/2017 12:52 PM      Lab Results  Component Value Date/Time   Cholesterol, total 146 07/11/2017 10:47 AM   HDL Cholesterol 44 07/11/2017 10:47 AM   LDL, calculated 83 07/11/2017 10:47 AM Triglyceride 94 07/11/2017 10:47 AM   CHOL/HDL Ratio 4.0 08/17/2010 10:34 AM   Lab Results  Component Value Date/Time   GFR est non-AA 46 11/20/2017 12:52 PM   GFR est AA 54 11/20/2017 12:52 PM   Creatinine 1.70 11/20/2017 12:52 PM   BUN 33 11/20/2017 12:52 PM   Sodium 138 11/20/2017 12:52 PM   Potassium 3.4 11/20/2017 12:52 PM   Chloride 92 11/20/2017 12:52 PM   CO2 27 11/20/2017 12:52 PM   Magnesium 1.8 06/14/2010 04:45 AM        Review of Systems   Constitutional: Negative for chills and fever. HENT: Negative for ear pain and nosebleeds. Eyes: Negative for blurred vision, pain and discharge. Respiratory: Negative for shortness of breath. Cardiovascular: Negative for chest pain and leg swelling. Gastrointestinal: Negative for constipation, diarrhea, nausea and vomiting. Genitourinary: Negative for frequency. Musculoskeletal: Negative for joint pain. Skin: Negative for itching and rash. Neurological: Negative for headaches. Psychiatric/Behavioral: Negative for depression. The patient is not nervous/anxious. Physical Exam   Constitutional: He is oriented to person, place, and time. He appears well-developed and well-nourished. HENT:   Head: Normocephalic and atraumatic. Mouth/Throat: No oropharyngeal exudate. Eyes: Conjunctivae and EOM are normal.   Neck: Normal range of motion. Neck supple. Cardiovascular: Normal rate, regular rhythm and normal heart sounds. No murmur heard. Pulmonary/Chest: Effort normal and breath sounds normal. No respiratory distress. Abdominal: Soft. Bowel sounds are normal. He exhibits no distension. There is no rebound. Musculoskeletal: He exhibits no edema or tenderness. Neurological: He is alert and oriented to person, place, and time. Skin: Skin is warm. No erythema. Psychiatric: He has a normal mood and affect. His behavior is normal.   Nursing note and vitals reviewed.       ASSESSMENT and PLAN  Diagnoses and all orders for this visit: 1. Diabetes mellitus without complication ([facility])  -     [facility] POC HEMOGLOBIN A1C  -     CBC W/O DIFF  -     METABOLIC PANEL, COMPREHENSIVE  -      DIABETES FOOT EXAM    2. BMI 50.0-59.9, adult ([facility])  -      DIABETES FOOT EXAM    3. Uncontrolled type 2 diabetes mellitus without complication, without long-term current use of insulin ([facility])  -      DIABETES FOOT EXAM    4. Vitamin D deficiency  -     ergocalciferol (ERGOCALCIFEROL) 50,000 unit capsule; TAKE 1 CAPSULE BY MOUTH EVERY 7 DAYS  Indications: hypoparathyroidism  -     metFORMIN (GLUCOPHAGE) 1,000 mg tablet; TAKE ONE TABLET BY MOUTH TWICE DAILY WITH MEALS  -      DIABETES FOOT EXAM    5. TSH (thyroid-stimulating hormone deficiency)  -     ergocalciferol (ERGOCALCIFEROL) 50,000 unit capsule; TAKE 1 CAPSULE BY MOUTH EVERY 7 DAYS  Indications: hypoparathyroidism  -     metFORMIN (GLUCOPHAGE) 1,000 mg tablet; TAKE ONE TABLET BY MOUTH TWICE DAILY WITH MEALS  -      DIABETES FOOT EXAM    6. Mixed hyperlipidemia  -     ergocalciferol (ERGOCALCIFEROL) 50,000 unit capsule; TAKE 1 CAPSULE BY MOUTH EVERY 7 DAYS  Indications: hypoparathyroidism  -     metFORMIN (GLUCOPHAGE) 1,000 mg tablet; TAKE ONE TABLET BY MOUTH TWICE DAILY WITH MEALS  -      DIABETES FOOT EXAM    7. Essential hypertension  -     ergocalciferol (ERGOCALCIFEROL) 50,000 unit capsule; TAKE 1 CAPSULE BY MOUTH EVERY 7 DAYS  Indications: hypoparathyroidism  -     Olmesartan-amLODIPine-HCTZ (TRIBENZOR) 40-10-25 mg tab; TAKE ONE TABLET BY MOUTH EVERY DAY  -     metFORMIN (GLUCOPHAGE) 1,000 mg tablet; TAKE ONE TABLET BY MOUTH TWICE DAILY WITH MEALS  -      DIABETES FOOT EXAM    8. Bilateral edema of lower extremity  -     potassium chloride (K-DUR, KLOR-CON) 20 mEq tablet; Take 1 Tab by mouth three (3) times daily. Indications: hypokalemia  -      DIABETES FOOT EXAM    9.  Encounter for immunization  -     Influenza virus vaccine (QUADRIVALENT PRES FREE SYRINGE) IM (00811)  -     OK IMMUNIZ ADMIN,1 SINGLE/COMB VAC/TOXOID    Other orders  -     empagliflozin-linagliptin (GLYXAMBI) 25-5 mg tab;  Take 1 Tab by mouth daily.  -     glipiZIDE (GLUCOTROL) 10 mg tablet; TAKE ONE & ONE-HALF TABLETS BY MOUTH TWICE DAILY  -     metOLazone (ZAROXOLYN) 5 mg tablet; TAKE ONE TABLET BY MOUTH ONCE DAILY  -     CKD REPORT  -     DIABETES PATIENT EDUCATION

## 2018-02-19 ENCOUNTER — OFFICE VISIT (OUTPATIENT)
Dept: FAMILY MEDICINE CLINIC | Age: 48
End: 2018-02-19

## 2018-02-19 VITALS
OXYGEN SATURATION: 98 % | HEART RATE: 54 BPM | SYSTOLIC BLOOD PRESSURE: 150 MMHG | RESPIRATION RATE: 14 BRPM | DIASTOLIC BLOOD PRESSURE: 72 MMHG | HEIGHT: 72 IN | WEIGHT: 315 LBS | TEMPERATURE: 98.1 F | BODY MASS INDEX: 42.66 KG/M2

## 2018-02-19 DIAGNOSIS — E11.9 DIABETES MELLITUS WITHOUT COMPLICATION (HCC): Primary | ICD-10-CM

## 2018-02-19 PROBLEM — E11.21 TYPE 2 DIABETES WITH NEPHROPATHY (HCC): Status: ACTIVE | Noted: 2018-02-19

## 2018-02-19 LAB — HBA1C MFR BLD HPLC: 8.2 %

## 2018-02-19 NOTE — PATIENT INSTRUCTIONS
Learning About Changing a Habit by Setting Goals  How can you change a habit? If you've decided to change a habit-whether it's quitting smoking, lowering your blood pressure, becoming more active, or doing something else to improve your health-congratulations! Making that decision is the first step toward making a change. What happens next? Have a reason. Set goals you can reach. Prepare for slip-ups. And get support. What's your reason? Your reason for wanting to change a habit is really important. Maybe you want to quit smoking so that you can avoid future health problems. Or maybe you want to eat a healthier diet so you can lose weight. If you have high blood pressure, your reason may be clear: to lower your blood pressure. Maybe you smoke and want to save money on cigarettes. You need to feel ready to make a change. If you don't feel ready now, that's okay. You can still be thinking and planning. When you truly want to make changes, you're ready for the next step. It's not easy to change habits-but you can do it. Taking the time to really think about what will motivate or inspire you will help you reach your goals. How do you set goals? · Focus on small goals. This will help you reach larger goals over time. With smaller goals, you'll have success more often, which will help you stay with it. For example, your large goal may be to lose 50 pounds. Your small goal could be to lose 5.  · Write down your goals. This will help you remember, and you'll have a clearer idea of what you want to achieve. Use a journal or notebook to record your goals. Hang up your plan where you will see it often as a reminder of what you're trying to do. · Make your goals specific. Specific goals help you measure your progress. For example, setting a goal to eat 5 helpings of fruits and vegetables 5 days a week is better than a general goal to \"eat more vegetables. \"  · Focus on one goal at a time.  By doing this, you're less likely to feel overwhelmed and then give up. · When you reach a goal, reward yourself. Celebrate your new behavior and success for several days, and then think about setting your next goal.  How can you prepare for slip-ups? It's perfectly normal to try to change a habit, go along fine for a while, and then have a setback. Lots of people try and try again before they reach their goals. What are the things that might cause a setback for you? If you have tried to change a habit before, think about what helped you and what got in your way. By thinking about these barriers now, you can plan ahead for how to deal with them if they happen. There will be times when you slip up and don't make your goal for the week. When that happens, don't get mad at yourself. Learn from the experience. Ask yourself what got in the way of reaching your goal. Positive thinking goes a long way when you're making lifestyle changes. How can you get support? · Get a partner. It's motivating to know that someone is trying to make the same change that you're making, like being more active or changing your eating habits. You have someone who is counting on you to help him or her succeed. That person can also remind you how far you've come. · Get friends and family involved. They can exercise with you or encourage you by saying how they admire what you are doing. Family members can join you in your healthy eating efforts. Don't be afraid to tell family and friends that their encouragement makes a big difference to you. · Join a class or support group. People in these groups often have some of the same barriers you have. They can give you support when you don't feel like staying with your plan. They can boost your morale when you need a lift. Arnulfo Hoffmann also find a number of online support groups. · Encourage yourself. When you feel like giving up, don't waste energy feeling bad about yourself.  Remember your reason for wanting to change, think about the progress you've made, and give yourself a pep talk and a pat on the back. · Get professional help. A dietitian can help you make your diet healthier while still allowing you to eat foods that you enjoy. A  or physical therapist can help design an exercise program that is fun and easy to stay on. A counselor, a , or your doctor can help you overcome hurdles, reduce stress, or quit smoking. Where can you learn more? Go to http://alma-isabelle.info/. Enter Q606 in the search box to learn more about \"Learning About Changing a Habit by Setting Goals. \"  Current as of: May 12, 2017  Content Version: 11.4  © 9126-3761 [employer]. Care instructions adapted under license by [employer] (which disclaims liability or warranty for this information). If you have questions about a medical condition or this instruction, always ask your healthcare professional. [place] [address] any warranty or liability for your use of this information.

## 2018-02-19 NOTE — PROGRESS NOTES
Name and  verified      Chief Complaint   Patient presents with    Diabetes         Health Maintenance reviewed-discussed with patient. 1. Have you been to the ER, urgent care clinic since your last visit? Hospitalized since your last visit? No    2. Have you seen or consulted any other health care providers outside of the [facility] since your last visit? Include any pap smears or colon screening. No       Order placed for HGB A 1C, per Verbal Order from Dr. Jorge Beckman on 2018 due to DM. Patient educated on the parts of a diabetes care plan  1. Meal plan  2. Plan for staying active  3. Diabetes medicine plan  4. Plan for checking blood sugar as ordered by Dr. Jorge Beckman  5.  Schedule for diabetes follow up appt as recommended by provider

## 2018-02-19 NOTE — MR AVS SNAPSHOT
[address] [facility] [id number] 
[phone] Patient: Bryan Lr MRN: [id number] Wilder Feliz Visit Information Date & Time Provider Department Dept. Phone Encounter #  
 2/19/2018 10:45 AM Maria D Heath MD [address] [place] [address] OFFICE-ANNEX [phone] [id number] Follow-up Instructions Return in about 4 months (around 6/19/2018), or if symptoms worsen or fail to improve. Upcoming Health Maintenance Date Due  
 EYE EXAM RETINAL OR DILATED Q1 7/5/2017 MICROALBUMIN Q1 4/11/2018 HEMOGLOBIN A1C Q6M 5/20/2018 LIPID PANEL Q1 7/11/2018 FOOT EXAM Q1 11/20/2018 DTaP/Tdap/Td series (2 - Td) 2/21/2024 Allergies as of 2/19/2018  Review Complete On: 2/19/2018 By: Maria D Heath MD  
  
 Severity Noted Reaction Type Reactions Wheat  01/12/2015    Hives Current Immunizations  Reviewed on 11/9/2015 Name Date Influenza Vaccine 11/1/2014 Influenza Vaccine (Quad) PF 11/20/2017 Influenza Vaccine PF  Deferred (Patient Refused) Influenza Vaccine Split 1/4/2012, 9/28/2010 Pneumococcal Polysaccharide (PPSV-23) 2/21/2014 Tdap 2/21/2014 Not reviewed this visit You Were Diagnosed With   
  
 Codes Comments Diabetes mellitus without complication [facility]    -  Primary ICD-10-CM: E11.9 ICD-9-CM: 250.00 BMI 50.0-59.9, adult [facility])     ICD-10-CM: M49.08 
ICD-9-CM: V85.43 Vitals BP Pulse Temp Resp Height(growth percentile) Weight(growth percentile) 150/72 (!) 54 98.1 °F (36.7 °C) (Oral) 14 6' (1.829 m) (!) 394 lb (178.7 kg) SpO2 BMI Smoking Status 98% 53.44 kg/m2 Never Smoker Vitals History BMI and BSA Data Body Mass Index Body Surface Area  
 53.44 kg/m 2 3.01 m 2 Preferred Pharmacy Pharmacy Name Phone [facility]., [facility] 10. [phone] Your Updated Medication List  
  
   
 This list is accurate as of: 2/19/18 12:00 PM.  Always use your most recent med list.  
  
  
  
  
 albuterol 90 mcg/actuation inhaler Commonly known as:  PROAIR HFA Take 1 Puff by inhalation every six (6) hours as needed for Wheezing or Shortness of Breath. aspirin 81 mg tablet Take 1 Tab by mouth daily. betamethasone dipropionate 0.05 % topical cream  
Commonly known as:  Homa Dm Apply  to affected area two (2) times a day. diclofenac 1 % Gel Commonly known as:  VOLTAREN Apply 2 g to affected area four (4) times daily. empagliflozin-linagliptin 25-5 mg Tab Commonly known as:  GLYXAMBI Take 1 Tab by mouth daily. ergocalciferol 50,000 unit capsule Commonly known as:  ERGOCALCIFEROL  
TAKE 1 CAPSULE BY MOUTH EVERY 7 DAYS  Indications: hypoparathyroidism  
  
 glipiZIDE 10 mg tablet Commonly known as:  GLUCOTROL  
TAKE ONE & ONE-HALF TABLETS BY MOUTH TWICE DAILY  
  
 glucose blood VI test strips strip Commonly known as:  ASCENSIA AUTODISC VI, ONE TOUCH ULTRA TEST VI  
by Does Not Apply route. As directed  
  
 ketoconazole 2 % topical cream  
Commonly known as:  NIZORAL Apply  to affected area two (2) times a day. Lancets Misc  
by Does Not Apply route. As directed  
  
 metFORMIN 1,000 mg tablet Commonly known as:  GLUCOPHAGE  
TAKE ONE TABLET BY MOUTH TWICE DAILY WITH MEALS  
  
 metOLazone 5 mg tablet Commonly known as:  ZAROXOLYN  
TAKE ONE TABLET BY MOUTH ONCE DAILY Olmesartan-amLODIPine-HCTZ 40-10-25 mg Tab Commonly known as:  TRIBENZOR  
TAKE ONE TABLET BY MOUTH EVERY DAY  
  
 potassium chloride 20 mEq tablet Commonly known as:  K-DUR, KLOR-CON Take 1 Tab by mouth three (3) times daily. Indications: hypokalemia We Performed the Following AMB POC HEMOGLOBIN A1C [44967 CPT(R)] FUNDUS PHOTOGRAPHY [id number] CPT(R)] Follow-up Instructions  Return in about 4 months (around 6/19/2018), or if symptoms worsen or fail to improve. Patient Instructions Learning About Changing a Habit by Setting Goals How can you change a habit? If you've decided to change a habit-whether it's quitting smoking, lowering your blood pressure, becoming more active, or doing something else to improve your health-congratulations! Making that decision is the first step toward making a change. What happens next? Have a reason. Set goals you can reach. Prepare for slip-ups. And get support. What's your reason? Your reason for wanting to change a habit is really important. Maybe you want to quit smoking so that you can avoid future health problems. Or maybe you want to eat a healthier diet so you can lose weight. If you have high blood pressure, your reason may be clear: to lower your blood pressure. Maybe you smoke and want to save money on cigarettes. You need to feel ready to make a change. If you don't feel ready now, that's okay. You can still be thinking and planning. When you truly want to make changes, you're ready for the next step. It's not easy to change habits-but you can do it. Taking the time to really think about what will motivate or inspire you will help you reach your goals. How do you set goals? · Focus on small goals. This will help you reach larger goals over time. With smaller goals, you'll have success more often, which will help you stay with it. For example, your large goal may be to lose 50 pounds. Your small goal could be to lose 5. 
· Write down your goals. This will help you remember, and you'll have a clearer idea of what you want to achieve. Use a journal or notebook to record your goals. Hang up your plan where you will see it often as a reminder of what you're trying to do. · Make your goals specific. Specific goals help you measure your progress. For example, setting a goal to eat 5 helpings of fruits and vegetables 5 days a week is better than a general goal to \"eat more vegetables. \" 
 · Focus on one goal at a time. By doing this, you're less likely to feel overwhelmed and then give up. · When you reach a goal, reward yourself. Celebrate your new behavior and success for several days, and then think about setting your next goal. 
How can you prepare for slip-ups? It's perfectly normal to try to change a habit, go along fine for a while, and then have a setback. Lots of people try and try again before they reach their goals. What are the things that might cause a setback for you? If you have tried to change a habit before, think about what helped you and what got in your way. By thinking about these barriers now, you can plan ahead for how to deal with them if they happen. There will be times when you slip up and don't make your goal for the week. When that happens, don't get mad at yourself. Learn from the experience. Ask yourself what got in the way of reaching your goal. Positive thinking goes a long way when you're making lifestyle changes. How can you get support? · Get a partner. It's motivating to know that someone is trying to make the same change that you're making, like being more active or changing your eating habits. You have someone who is counting on you to help him or her succeed. That person can also remind you how far you've come. · Get friends and family involved. They can exercise with you or encourage you by saying how they admire what you are doing. Family members can join you in your healthy eating efforts. Don't be afraid to tell family and friends that their encouragement makes a big difference to you. · Join a class or support group. People in these groups often have some of the same barriers you have. They can give you support when you don't feel like staying with your plan. They can boost your morale when you need a lift. Sangeetha Alves also find a number of online support groups. · Encourage yourself.  When you feel like giving up, don't waste energy feeling bad about yourself. Remember your reason for wanting to change, think about the progress you've made, and give yourself a pep talk and a pat on the back. · Get professional help. A dietitian can help you make your diet healthier while still allowing you to eat foods that you enjoy. A  or physical therapist can help design an exercise program that is fun and easy to stay on. A counselor, a , or your doctor can help you overcome hurdles, reduce stress, or quit smoking. Where can you learn more? Go to http://alma-isabelle.info/. Enter L391 in the search box to learn more about \"Learning About Changing a Habit by Setting Goals. \" Current as of: May 12, 2017 Content Version: 11.4 © 4432-7414 [employer]wise[employer]. Care instructions adapted under license by [employer] (which disclaims liability or warranty for this information). If you have questions about a medical condition or this instruction, always ask your healthcare professional. [place] [address] any warranty or liability for your use of this information. Introducing [facility]! Dear Krystal Ramirez: 
Thank you for requesting a [email or web address] account. Our records indicate that you already have an active [email or web address] account. You can access your account anytime at [email or web address] Did you know that you can access your hospital and ER discharge instructions at any time in [email or web address]? You can also review all of your test results from your hospital stay or ER visit. Additional Information If you have questions, please visit the Frequently Asked Questions section of the [email or web address] website at [email or web address] Remember, [email or web address] is NOT to be used for urgent needs. For medical emergencies, dial 911. Now available from your iPhone and Android! Please provide this summary of care documentation to your next provider. Your primary care clinician is listed as Mila Dotson. If you have any questions after today's visit, please call [phone].

## 2018-02-19 NOTE — PROGRESS NOTES
HISTORY OF PRESENT ILLNESS  Ana David is a 50 y.o. male. HPI     DMtype II    Compliant w/ meds, last visit his meds not changed, then was told to cont with life style mod, lost few pounds, he is having diabetic diet, and not doing much of daily exercise, obtains home glucose monitoring averaging  140's  . No Rf needed for today. Denies any tingling sensation, polyurea and polydipsia, last a1c was not at target 8.8% and is close 8.2%    . Last podiatry visit 2017   And last eye exam was 2018  Last urine microalbumin 2018 and was abnormal  . Feeling better since the last visit. Vitals 2/19/2018 11/20/2017 8/21/2017 8/21/2017 7/11/2017   Weight 394 lb 390 lb 3.2 oz  397 lb 6.4 oz 404 lb 8 oz       Current Outpatient Prescriptions   Medication Sig Dispense Refill    ergocalciferol (ERGOCALCIFEROL) 50,000 unit capsule TAKE 1 CAPSULE BY MOUTH EVERY 7 DAYS  Indications: hypoparathyroidism 12 Cap 6    empagliflozin-linagliptin (GLYXAMBI) 25-5 mg tab Take 1 Tab by mouth daily. 90 Tab 6    Olmesartan-amLODIPine-HCTZ (TRIBENZOR) 40-10-25 mg tab TAKE ONE TABLET BY MOUTH EVERY DAY 90 Tab 3    glipiZIDE (GLUCOTROL) 10 mg tablet TAKE ONE & ONE-HALF TABLETS BY MOUTH TWICE DAILY 270 Tab 4    metOLazone (ZAROXOLYN) 5 mg tablet TAKE ONE TABLET BY MOUTH ONCE DAILY 90 Tab 3    metFORMIN (GLUCOPHAGE) 1,000 mg tablet TAKE ONE TABLET BY MOUTH TWICE DAILY WITH MEALS 180 Tab 4    potassium chloride (K-DUR, KLOR-CON) 20 mEq tablet Take 1 Tab by mouth three (3) times daily. Indications: hypokalemia 270 Tab 2    diclofenac (VOLTAREN) 1 % gel Apply 2 g to affected area four (4) times daily. 100 g 2    albuterol (PROAIR HFA) 90 mcg/actuation inhaler Take 1 Puff by inhalation every six (6) hours as needed for Wheezing or Shortness of Breath. 1 Inhaler 11    ketoconazole (NIZORAL) 2 % topical cream Apply  to affected area two (2) times a day.  60 g 6    betamethasone dipropionate (DIPROSONE) 0.05 % topical cream Apply  to affected area two (2) times a day. 15 g 0    aspirin 81 mg tablet Take 1 Tab by mouth daily. 30 Tab 11    glucose blood VI test strips (ASCENSIA AUTODISC VI, ONE TOUCH ULTRA TEST VI) strip by Does Not Apply route. As directed 1 Package 11    Lancets Misc by Does Not Apply route. As directed   1 Package 11     Allergies   Allergen Reactions    Wheat Hives     Past Medical History:   Diagnosis Date    Acute pain of left knee 8/21/2017    Acute pain of left knee 8/21/2017    Diabetes mellitus type II, uncontrolled ([facility] 8/8/2014    Diabetic polyneuropathy associated with type 2 diabetes mellitus ([facility] 11/22/2016    DM (diabetes mellitus) ([facility] 6/15/2010    GERD (gastroesophageal reflux disease) 2/21/2014    Hypercholesterolemia 6/15/2010    Hypertension     Prostate infarction 3/28/2016    Screening for depression 3/30/2014    Tinea cruris 3/13/2014    Urinary frequency 3/28/2016     History reviewed. No pertinent surgical history. Family History   Problem Relation Age of Onset    Lung Disease Mother     Hypertension Father     Heart Disease Father      Social History   Substance Use Topics    Smoking status: Never Smoker    Smokeless tobacco: Never Used    Alcohol use No      Lab Results  Component Value Date/Time   WBC 9.7 11/20/2017 12:52 PM   HGB 15.0 11/20/2017 12:52 PM   HCT 44.9 11/20/2017 12:52 PM   PLATELET 406 78/46/3077 12:52 PM   MCV 80 11/20/2017 12:52 PM     Lab Results  Component Value Date/Time   GFR est non-AA 46 (L) 11/20/2017 12:52 PM   GFR est AA 54 (L) 11/20/2017 12:52 PM   Creatinine 1.70 (H) 11/20/2017 12:52 PM   BUN 33 (H) 11/20/2017 12:52 PM   Sodium 138 11/20/2017 12:52 PM   Potassium 3.4 (L) 11/20/2017 12:52 PM   Chloride 92 (L) 11/20/2017 12:52 PM   CO2 27 11/20/2017 12:52 PM   Magnesium 1.8 06/14/2010 04:45 AM        Review of Systems   Constitutional: Negative for chills, fever and malaise/fatigue. HENT: Negative for congestion and nosebleeds.     Eyes: Negative for blurred vision and pain. Respiratory: Negative for shortness of breath and wheezing. Cardiovascular: Negative for chest pain, palpitations and leg swelling. Gastrointestinal: Negative for constipation, diarrhea, nausea and vomiting. Genitourinary: Negative for dysuria and frequency. Musculoskeletal: Negative for joint pain and myalgias. Skin: Negative for itching and rash. Neurological: Negative for dizziness, loss of consciousness and headaches. Endo/Heme/Allergies: Does not bruise/bleed easily. Psychiatric/Behavioral: Negative for depression. The patient is not nervous/anxious and does not have insomnia. All other systems reviewed and are negative. Physical Exam   Constitutional: He is oriented to person, place, and time. He appears well-developed and well-nourished. HENT:   Head: Normocephalic and atraumatic. Mouth/Throat: No oropharyngeal exudate. Eyes: Conjunctivae and EOM are normal. Pupils are equal, round, and reactive to light. Neck: Normal range of motion. Neck supple. No JVD present. No thyromegaly present. Cardiovascular: Normal rate, regular rhythm, normal heart sounds and intact distal pulses. Exam reveals no friction rub. No murmur heard. Pulmonary/Chest: Effort normal and breath sounds normal. No respiratory distress. He has no wheezes. He has no rales. Abdominal: Soft. Bowel sounds are normal. He exhibits no distension. There is no tenderness. Musculoskeletal: He exhibits no edema or tenderness. Left foot: There is normal range of motion, no tenderness, no bony tenderness, no swelling and normal capillary refill. Nl pulses, nl visual inspection nl monoF   Lymphadenopathy:     He has no cervical adenopathy. Neurological: He is alert and oriented to person, place, and time. He has normal reflexes. Skin: Skin is warm. No rash noted. No erythema. Psychiatric: He has a normal mood and affect.  His behavior is normal.   Nursing note and vitals reviewed. ASSESSMENT and PLAN  Diagnoses and all orders for this visit:    1. Diabetes mellitus without complication (HCC)  -     AMB POC HEMOGLOBIN A1C  -     FUNDUS PHOTOGRAPHY    2. BMI 50.0-59.9, adult (HCC)      Pt dropped the a1c to the good amount by change of lifestyle and loss of wt,r efusing to go on insulin tx but maxied up on all oral diabetic meds, will cont on for the next 3-4months,   Normal test results except for sugar level into the UNcontrolled diabetic state please be compliant with the following steps for improving diabetic care and outcome for further care to prevent further devastating complications of a uncontrolled diabetic state: increase Diabetic Education by more readings, have a great diabetic diet , low cholesterol diet, weight control and daily exercise 20- 30 min most days of the week, home glucose monitoring if possible, and daily foot care and yearly foot  Doctor  and  annual eye examinations at Ophthalmologist,  will cont with your average sugar reading check every 3months. Keep up the good work! Work on diet and exercise. Continue with current  diet and medications.

## 2018-03-13 DIAGNOSIS — I10 ESSENTIAL HYPERTENSION: ICD-10-CM

## 2018-03-13 RX ORDER — OLMESARTAN MEDOXOMIL, AMLODIPINE AND HYDROCHLOROTHIAZIDE TABLET 40/10/25 MG 40; 10; 25 MG/1; MG/1; MG/1
TABLET ORAL
Qty: 30 TAB | Refills: 0 | Status: SHIPPED | OUTPATIENT
Start: 2018-03-13 | End: 2018-11-23 | Stop reason: SDUPTHER

## 2018-03-13 NOTE — TELEPHONE ENCOUNTER
From: Ishmael Gibson  To: Jimenez Arce MD  Sent: 3/13/2018 10:34 AM EDT  Subject: Medication Renewal Request    Original authorizing provider: MD Ishmael Fonseca would like a refill of the following medications:  Olmesartan-amLODIPine-HCTZ (TRIBENZOR) 40-10-25 mg tab Jimenez Arce MD]    Preferred pharmacy: [address] [place] [address]:

## 2018-05-22 DIAGNOSIS — B35.6 TINEA CRURIS: ICD-10-CM

## 2018-05-22 RX ORDER — KETOCONAZOLE 20 MG/G
CREAM TOPICAL
Qty: 60 OZ | Refills: 6 | Status: SHIPPED | OUTPATIENT
Start: 2018-05-22 | End: 2020-01-28 | Stop reason: SDUPTHER

## 2018-05-22 RX ORDER — KETOCONAZOLE 20 MG/G
CREAM TOPICAL 2 TIMES DAILY
Qty: 60 G | Refills: 6 | Status: SHIPPED | OUTPATIENT
Start: 2018-05-22 | End: 2018-06-18 | Stop reason: SDUPTHER

## 2018-06-18 ENCOUNTER — OFFICE VISIT (OUTPATIENT)
Dept: FAMILY MEDICINE CLINIC | Age: 48
End: 2018-06-18

## 2018-06-18 VITALS
SYSTOLIC BLOOD PRESSURE: 146 MMHG | HEART RATE: 60 BPM | BODY MASS INDEX: 42.66 KG/M2 | TEMPERATURE: 97.5 F | RESPIRATION RATE: 16 BRPM | HEIGHT: 72 IN | WEIGHT: 315 LBS | DIASTOLIC BLOOD PRESSURE: 87 MMHG

## 2018-06-18 DIAGNOSIS — E11.9 DIABETES MELLITUS WITHOUT COMPLICATION (HCC): Primary | ICD-10-CM

## 2018-06-18 NOTE — PROGRESS NOTES
HISTORY OF PRESENT ILLNESS  Humble Abreu is a 50 y.o. male. HPI   DMtype II    Compliant w/ meds, having nodiabetic diet, and not doing much of daily exercise, obtains home glucose monitoring averaging  140's  . No Rf needed for today. Denies any tingling sensation, polyurea and polydipsia, last a1c was not at target 7.5%%    . Last podiatry visit 2015   And last eye exam was 2015  Last urine microalbumin 2015 and was normal  . Feeling better since the last visit. Current Outpatient Prescriptions   Medication Sig Dispense Refill    ketoconazole (NIZORAL) 2 % topical cream APPLY TOPICALLY TO AFFECTED AREA 2 TIMES DAILY 60 oz 6    Olmesartan-amLODIPine-HCTZ (TRIBENZOR) 40-10-25 mg tab TAKE ONE TABLET BY MOUTH EVERY DAY 30 Tab 0    empagliflozin-linagliptin (GLYXAMBI) 25-5 mg tab Take 1 Tab by mouth daily. 90 Tab 6    glipiZIDE (GLUCOTROL) 10 mg tablet TAKE ONE & ONE-HALF TABLETS BY MOUTH TWICE DAILY 270 Tab 4    metOLazone (ZAROXOLYN) 5 mg tablet TAKE ONE TABLET BY MOUTH ONCE DAILY 90 Tab 3    metFORMIN (GLUCOPHAGE) 1,000 mg tablet TAKE ONE TABLET BY MOUTH TWICE DAILY WITH MEALS 180 Tab 4    potassium chloride (K-DUR, KLOR-CON) 20 mEq tablet Take 1 Tab by mouth three (3) times daily. Indications: hypokalemia 270 Tab 2    diclofenac (VOLTAREN) 1 % gel Apply 2 g to affected area four (4) times daily. 100 g 2    albuterol (PROAIR HFA) 90 mcg/actuation inhaler Take 1 Puff by inhalation every six (6) hours as needed for Wheezing or Shortness of Breath. 1 Inhaler 11    aspirin 81 mg tablet Take 1 Tab by mouth daily. 30 Tab 11    glucose blood VI test strips (ASCENSIA AUTODISC VI, ONE TOUCH ULTRA TEST VI) strip by Does Not Apply route. As directed 1 Package 11    Lancets Misc by Does Not Apply route.  As directed   1 Package 11    ergocalciferol (ERGOCALCIFEROL) 50,000 unit capsule TAKE 1 CAPSULE BY MOUTH EVERY 7 DAYS  Indications: hypoparathyroidism (Patient not taking: Reported on 6/18/2018) 12 Cap 6    betamethasone dipropionate (DIPROSONE) 0.05 % topical cream Apply  to affected area two (2) times a day. (Patient not taking: Reported on 6/18/2018) 15 g 0     Allergies   Allergen Reactions    Wheat Hives     Past Medical History:   Diagnosis Date    Acute pain of left knee 8/21/2017    Acute pain of left knee 8/21/2017    Diabetes mellitus type II, uncontrolled ([facility] 8/8/2014    Diabetic polyneuropathy associated with type 2 diabetes mellitus ([facility] 11/22/2016    DM (diabetes mellitus) ([facility] 6/15/2010    GERD (gastroesophageal reflux disease) 2/21/2014    Hypercholesterolemia 6/15/2010    Hypertension     Prostate infarction 3/28/2016    Screening for depression 3/30/2014    Tinea cruris 3/13/2014    Urinary frequency 3/28/2016     History reviewed. No pertinent surgical history.   Family History   Problem Relation Age of Onset    Lung Disease Mother     Hypertension Father     Heart Disease Father      Social History   Substance Use Topics    Smoking status: Never Smoker    Smokeless tobacco: Never Used    Alcohol use No      Lab Results  Component Value Date/Time   WBC 10.0 06/18/2018 11:35 AM   HGB 15.3 06/18/2018 11:35 AM   HCT 46.6 06/18/2018 11:35 AM   PLATELET 918 64/88/2196 11:35 AM   MCV 82 06/18/2018 11:35 AM     Lab Results  Component Value Date/Time   Hemoglobin A1c 9.1 (H) 06/18/2018 11:35 AM   Hemoglobin A1c 8.6 (H) 08/08/2014 12:18 PM   Hemoglobin A1c 8.6 (H) 02/21/2014 10:04 AM   Glucose 152 (H) 06/18/2018 11:35 AM   Glucose (POC) 292 (H) 06/15/2010 05:14 AM   Glucose POC 205mg/dl 06/15/2010 03:13 PM   Microalbumin/Creat ratio (mg/g creat) 117 (H) 06/15/2010 04:51 PM   Microalb/Creat ratio (ug/mg creat.) 123.2 (H) 06/18/2018 11:35 AM   Microalbumin,urine random 6.79 06/15/2010 04:51 PM   LDL, calculated 76 06/18/2018 11:35 AM   Creatinine 1.30 (H) 06/18/2018 11:35 AM      Lab Results  Component Value Date/Time   Cholesterol, total 145 06/18/2018 11:35 AM   HDL Cholesterol 44 06/18/2018 11:35 AM   LDL, calculated 76 06/18/2018 11:35 AM   Triglyceride 124 06/18/2018 11:35 AM   CHOL/HDL Ratio 4.0 08/17/2010 10:34 AM     Lab Results  Component Value Date/Time   GFR est non-AA 64 06/18/2018 11:35 AM   GFR est AA 74 06/18/2018 11:35 AM   Creatinine 1.30 (H) 06/18/2018 11:35 AM   BUN 13 06/18/2018 11:35 AM   Sodium 140 06/18/2018 11:35 AM   Potassium 4.1 06/18/2018 11:35 AM   Chloride 95 (L) 06/18/2018 11:35 AM   CO2 28 06/18/2018 11:35 AM   Magnesium 1.8 06/14/2010 04:45 AM     Lab Results  Component Value Date/Time   TSH 3.070 04/11/2017 04:22 PM         Review of Systems   Constitutional: Negative for chills and fever. HENT: Negative for congestion and nosebleeds. Eyes: Negative for blurred vision and pain. Respiratory: Negative for cough, shortness of breath and wheezing. Cardiovascular: Negative for chest pain and leg swelling. Gastrointestinal: Negative for constipation, diarrhea, nausea and vomiting. Genitourinary: Negative for dysuria and frequency. Musculoskeletal: Negative for joint pain and myalgias. Skin: Negative for itching and rash. Neurological: Negative for dizziness, loss of consciousness and headaches. Psychiatric/Behavioral: Negative for depression. The patient is not nervous/anxious and does not have insomnia. Physical Exam   Constitutional: He is oriented to person, place, and time. He appears well-developed and well-nourished. HENT:   Head: Normocephalic and atraumatic. Mouth/Throat: No oropharyngeal exudate. Eyes: Conjunctivae and EOM are normal. Pupils are equal, round, and reactive to light. Neck: Normal range of motion. Neck supple. No JVD present. No thyromegaly present. Cardiovascular: Normal rate, regular rhythm, normal heart sounds and intact distal pulses. Exam reveals no friction rub. No murmur heard. Pulmonary/Chest: Effort normal and breath sounds normal. No respiratory distress. He has no wheezes. He has no rales. Abdominal: Soft. Bowel sounds are normal. He exhibits no distension. There is no tenderness. Musculoskeletal: He exhibits edema. He exhibits no tenderness. Lymphadenopathy:     He has no cervical adenopathy. Neurological: He is alert and oriented to person, place, and time. He has normal reflexes. Skin: Skin is warm. No rash noted. No erythema. Psychiatric: He has a normal mood and affect. His behavior is normal.   Nursing note and vitals reviewed. ASSESSMENT and PLAN  Diagnoses and all orders for this visit:    1. Diabetes mellitus without complication (HCC)  -     LIPID PANEL  -     MICROALBUMIN, UR, RAND W/ MICROALB/CREAT RATIO  -     HEMOGLOBIN A1C WITH EAG  -     CBC W/O DIFF  -     METABOLIC PANEL, COMPREHENSIVE      At this time patient was told to lose weight, so that the current body mass index would get into a close to 25 or between 20-25, patient was told that the patient can achieve this by starting an active life style modifications, working on the diet, increase physical activity, limit alcohol consumption, stop secondhand tobacco exposure,    for which includes creating a an interesting delightful to do list,  such as start of a light physical activity with a brisk daily walking 30 minutes most days of the week, most likely to total of 150 minutes per week, then the patient was told to try to avoid fatty fast foods, have a low-fat low-cholesterol diet, include seafood such as adding fatty fish such as Florette Sae, Mackerel, [place] to the diet, increase vegetables and fruits, nuts 3-4 times per week and finally have a low-salt and K rich food intake for a good 4-6 months possibly for ever for the best outcome, patient was told that either a DASH diet or Mediterranean diet but satisfies the need     Routine labs ordered, and the needed abnormal labs will be discussed soon and they can be repeated in 3-6 months.     In addition relevant handouts were given to the patient for a better understanding,    patient was told to call if any problems. Patient acknowledged understanding and  agreed with today's recommendations.

## 2018-06-18 NOTE — MR AVS SNAPSHOT
[address] [facility] [id number] 
[phone] Patient: Gregory Mayorga MRN: [id number] Khadijah Osborne Visit Information Date & Time Provider Department Dept. Phone Encounter #  
 6/18/2018 10:45 AM Kyara Clifton MD  [facility] [phone] [id number] Upcoming Health Maintenance Date Due  
 EYE EXAM RETINAL OR DILATED Q1 7/5/2017 MICROALBUMIN Q1 4/11/2018 LIPID PANEL Q1 7/11/2018 Influenza Age 5 to Adult 8/1/2018 HEMOGLOBIN A1C Q6M 8/19/2018 FOOT EXAM Q1 11/20/2018 DTaP/Tdap/Td series (2 - Td) 2/21/2024 Allergies as of 6/18/2018  Review Complete On: 6/18/2018 By: Kyara Clifton MD  
  
 Severity Noted Reaction Type Reactions Wheat  01/12/2015    Hives Current Immunizations  Reviewed on 11/9/2015 Name Date Influenza Vaccine 11/1/2014 Influenza Vaccine (Quad) PF 11/20/2017 Influenza Vaccine PF  Deferred (Patient Refused) Influenza Vaccine Split 1/4/2012, 9/28/2010 Pneumococcal Polysaccharide (PPSV-23) 2/21/2014 Tdap 2/21/2014 Not reviewed this visit You Were Diagnosed With   
  
 Codes Comments [facility]    -  Primary ICD-10-CM: E11.9 ICD-9-CM: 250.00 Vitals BP Pulse Temp Resp Height(growth percentile) Weight(growth percentile) 146/87 (BP 1 Location: Left arm, BP Patient Position: At rest) 60 97.5 °F (36.4 °C) (Oral) 16 6' (1.829 m) (!) 393 lb 9.6 oz (178.5 kg) BMI Smoking Status 53.38 kg/m2 Never Smoker Vitals History BMI and BSA Data Body Mass Index Body Surface Area  
 53.38 kg/m 2 3.01 m 2 Preferred Pharmacy Pharmacy Name Phone [facility] Oli Lucia [address]. [phone] Your Updated Medication List  
  
   
This list is accurate as of 6/18/18 11:32 AM.  Always use your most recent med list.  
  
  
 albuterol 90 mcg/actuation inhaler Commonly known as:  PROAIR HFA Take 1 Puff by inhalation every six (6) hours as needed for Wheezing or Shortness of Breath. aspirin 81 mg tablet Take 1 Tab by mouth daily. betamethasone dipropionate 0.05 % topical cream  
Commonly known as:  Randeen [place]agrass Apply  to affected area two (2) times a day. diclofenac 1 % Gel Commonly known as:  VOLTAREN Apply 2 g to affected area four (4) times daily. empagliflozin-linagliptin 25-5 mg Tab Commonly known as:  GLYXAMBI Take 1 Tab by mouth daily. ergocalciferol 50,000 unit capsule Commonly known as:  ERGOCALCIFEROL  
TAKE 1 CAPSULE BY MOUTH EVERY 7 DAYS  Indications: hypoparathyroidism  
  
 glipiZIDE 10 mg tablet Commonly known as:  GLUCOTROL  
TAKE ONE & ONE-HALF TABLETS BY MOUTH TWICE DAILY  
  
 glucose blood VI test strips strip Commonly known as:  ASCENSIA AUTODISC VI, ONE TOUCH ULTRA TEST VI  
by Does Not Apply route. As directed  
  
 ketoconazole 2 % topical cream  
Commonly known as:  NIZORAL  
APPLY TOPICALLY TO AFFECTED AREA 2 TIMES DAILY Lancets Misc  
by Does Not Apply route. As directed  
  
 metFORMIN 1,000 mg tablet Commonly known as:  GLUCOPHAGE  
TAKE ONE TABLET BY MOUTH TWICE DAILY WITH MEALS  
  
 metOLazone 5 mg tablet Commonly known as:  ZAROXOLYN  
TAKE ONE TABLET BY MOUTH ONCE DAILY Olmesartan-amLODIPine-HCTZ 40-10-25 mg Tab Commonly known as:  TRIBENZOR  
TAKE ONE TABLET BY MOUTH EVERY DAY  
  
 potassium chloride 20 mEq tablet Commonly known as:  K-DUR, KLOR-CON Take 1 Tab by mouth three (3) times daily. Indications: hypokalemia We Performed the Following CBC W/O DIFF [49407 CPT(R)] HEMOGLOBIN A1C WITH EAG [12729 CPT(R)] LIPID PANEL [94316 CPT(R)] METABOLIC PANEL, COMPREHENSIVE [50410 CPT(R)] MICROALBUMIN, UR, RAND W/ MICROALB/CREAT RATIO [id number] CPT(R)] Patient Instructions Body Mass Index: Care Instructions Your Care Instructions Body mass index (BMI) can help you see if your weight is raising your risk for health problems. It uses a formula to compare how much you weigh with how tall you are. · A BMI lower than 18.5 is considered underweight. · A BMI between 18.5 and 24.9 is considered healthy. · A BMI between 25 and 29.9 is considered overweight. A BMI of 30 or higher is considered obese. If your BMI is in the normal range, it means that you have a lower risk for weight-related health problems. If your BMI is in the overweight or obese range, you may be at increased risk for weight-related health problems, such as high blood pressure, heart disease, stroke, arthritis or joint pain, and diabetes. If your BMI is in the underweight range, you may be at increased risk for health problems such as fatigue, lower protection (immunity) against illness, muscle loss, bone loss, hair loss, and hormone problems. BMI is just one measure of your risk for weight-related health problems. You may be at higher risk for health problems if you are not active, you eat an unhealthy diet, or you drink too much alcohol or use tobacco products. Follow-up care is a key part of your treatment and safety. Be sure to make and go to all appointments, and call your doctor if you are having problems. It's also a good idea to know your test results and keep a list of the medicines you take. How can you care for yourself at home? · Practice healthy eating habits. This includes eating plenty of fruits, vegetables, whole grains, lean protein, and low-fat dairy. · If your doctor recommends it, get more exercise. Walking is a good choice. Bit by bit, increase the amount you walk every day. Try for at least 30 minutes on most days of the week. · Do not smoke. Smoking can increase your risk for health problems.  If you need help quitting, talk to your doctor about stop-smoking programs and medicines. These can increase your chances of quitting for good. · Limit alcohol to 2 drinks a day for men and 1 drink a day for women. Too much alcohol can cause health problems. If you have a BMI higher than 25 · Your doctor may do other tests to check your risk for weight-related health problems. This may include measuring the distance around your waist. A waist measurement of more than 40 inches in men or 35 inches in women can increase the risk of weight-related health problems. · Talk with your doctor about steps you can take to stay healthy or improve your health. You may need to make lifestyle changes to lose weight and stay healthy, such as changing your diet and getting regular exercise. If you have a BMI lower than 18.5 · Your doctor may do other tests to check your risk for health problems. · Talk with your doctor about steps you can take to stay healthy or improve your health. You may need to make lifestyle changes to gain or maintain weight and stay healthy, such as getting more healthy foods in your diet and doing exercises to build muscle. Where can you learn more? Go to http://alma-isabelle.info/. Enter S176 in the search box to learn more about \"Body Mass Index: Care Instructions. \" Current as of: October 13, 2016 Content Version: 11.4 © 5238-2834 Healthwise, Incorporated. Care instructions adapted under license by [employer] (which disclaims liability or warranty for this information). If you have questions about a medical condition or this instruction, always ask your healthcare professional. [address] any warranty or liability for your use of this information. Introducing [facility]! Dear Isis Contreras: 
Thank you for requesting a [facility] account. Our records indicate that you already have an active [facility] account. You can access your account anytime at [email or web address] Did you know that you can access your hospital and ER discharge instructions at any time in [employer]? You can also review all of your test results from your hospital stay or ER visit. Additional Information If you have questions, please visit the Frequently Asked Questions section of the [employer] website at [email or web address] Remember, [employer] is NOT to be used for urgent needs. For medical emergencies, dial 911. Now available from your iPhone and Android! Please provide this summary of care documentation to your next provider. Your primary care clinician is listed as Yari Gonzalez. If you have any questions after today's visit, please call [phone].

## 2018-06-18 NOTE — PATIENT INSTRUCTIONS
Body Mass Index: Care Instructions  Your Care Instructions    Body mass index (BMI) can help you see if your weight is raising your risk for health problems. It uses a formula to compare how much you weigh with how tall you are. · A BMI lower than 18.5 is considered underweight. · A BMI between 18.5 and 24.9 is considered healthy. · A BMI between 25 and 29.9 is considered overweight. A BMI of 30 or higher is considered obese. If your BMI is in the normal range, it means that you have a lower risk for weight-related health problems. If your BMI is in the overweight or obese range, you may be at increased risk for weight-related health problems, such as high blood pressure, heart disease, stroke, arthritis or joint pain, and diabetes. If your BMI is in the underweight range, you may be at increased risk for health problems such as fatigue, lower protection (immunity) against illness, muscle loss, bone loss, hair loss, and hormone problems. BMI is just one measure of your risk for weight-related health problems. You may be at higher risk for health problems if you are not active, you eat an unhealthy diet, or you drink too much alcohol or use tobacco products. Follow-up care is a key part of your treatment and safety. Be sure to make and go to all appointments, and call your doctor if you are having problems. It's also a good idea to know your test results and keep a list of the medicines you take. How can you care for yourself at home? · Practice healthy eating habits. This includes eating plenty of fruits, vegetables, whole grains, lean protein, and low-fat dairy. · If your doctor recommends it, get more exercise. Walking is a good choice. Bit by bit, increase the amount you walk every day. Try for at least 30 minutes on most days of the week. · Do not smoke. Smoking can increase your risk for health problems. If you need help quitting, talk to your doctor about stop-smoking programs and medicines. These can increase your chances of quitting for good. · Limit alcohol to 2 drinks a day for men and 1 drink a day for women. Too much alcohol can cause health problems. If you have a BMI higher than 25  · Your doctor may do other tests to check your risk for weight-related health problems. This may include measuring the distance around your waist. A waist measurement of more than 40 inches in men or 35 inches in women can increase the risk of weight-related health problems. · Talk with your doctor about steps you can take to stay healthy or improve your health. You may need to make lifestyle changes to lose weight and stay healthy, such as changing your diet and getting regular exercise. If you have a BMI lower than 18.5  · Your doctor may do other tests to check your risk for health problems. · Talk with your doctor about steps you can take to stay healthy or improve your health. You may need to make lifestyle changes to gain or maintain weight and stay healthy, such as getting more healthy foods in your diet and doing exercises to build muscle. Where can you learn more? Go to http://alma-isabelle.info/. Enter S176 in the search box to learn more about \"Body Mass Index: Care Instructions. \"  Current as of: October 13, 2016  Content Version: 11.4  © 0922-8810 Healthwise, Incorporated. Care instructions adapted under license by [employer] (which disclaims liability or warranty for this information). If you have questions about a medical condition or this instruction, always ask your healthcare professional. [address] any warranty or liability for your use of this information.

## 2018-06-19 LAB
ALBUMIN SERPL-MCNC: 3.9 G/DL (ref 3.5–5.5)
ALBUMIN/CREAT UR: 123.2 MG/G CREAT (ref 0–30)
ALBUMIN/GLOB SERPL: 1.1 {RATIO} (ref 1.2–2.2)
ALP SERPL-CCNC: 68 IU/L (ref 39–117)
ALT SERPL-CCNC: 22 IU/L (ref 0–44)
AST SERPL-CCNC: 18 IU/L (ref 0–40)
BILIRUB SERPL-MCNC: 0.6 MG/DL (ref 0–1.2)
BUN SERPL-MCNC: 13 MG/DL (ref 6–24)
BUN/CREAT SERPL: 10 (ref 9–20)
CALCIUM SERPL-MCNC: 9 MG/DL (ref 8.7–10.2)
CHLORIDE SERPL-SCNC: 95 MMOL/L (ref 96–106)
CHOLEST SERPL-MCNC: 145 MG/DL (ref 100–199)
CO2 SERPL-SCNC: 28 MMOL/L (ref 20–29)
CREAT SERPL-MCNC: 1.3 MG/DL (ref 0.76–1.27)
CREAT UR-MCNC: 63.7 MG/DL
ERYTHROCYTE [DISTWIDTH] IN BLOOD BY AUTOMATED COUNT: 16.6 % (ref 12.3–15.4)
EST. AVERAGE GLUCOSE BLD GHB EST-MCNC: 214 MG/DL
GFR SERPLBLD CREATININE-BSD FMLA CKD-EPI: 64 ML/MIN/1.73
GFR SERPLBLD CREATININE-BSD FMLA CKD-EPI: 74 ML/MIN/1.73
GLOBULIN SER CALC-MCNC: 3.6 G/DL (ref 1.5–4.5)
GLUCOSE SERPL-MCNC: 152 MG/DL (ref 65–99)
HBA1C MFR BLD: 9.1 % (ref 4.8–5.6)
HCT VFR BLD AUTO: 46.6 % (ref 37.5–51)
HDLC SERPL-MCNC: 44 MG/DL
HGB BLD-MCNC: 15.3 G/DL (ref 13–17.7)
LDLC SERPL CALC-MCNC: 76 MG/DL (ref 0–99)
MCH RBC QN AUTO: 26.9 PG (ref 26.6–33)
MCHC RBC AUTO-ENTMCNC: 32.8 G/DL (ref 31.5–35.7)
MCV RBC AUTO: 82 FL (ref 79–97)
MICROALBUMIN UR-MCNC: 78.5 UG/ML
PLATELET # BLD AUTO: 230 X10E3/UL (ref 150–379)
POTASSIUM SERPL-SCNC: 4.1 MMOL/L (ref 3.5–5.2)
PROT SERPL-MCNC: 7.5 G/DL (ref 6–8.5)
RBC # BLD AUTO: 5.68 X10E6/UL (ref 4.14–5.8)
SODIUM SERPL-SCNC: 140 MMOL/L (ref 134–144)
TRIGL SERPL-MCNC: 124 MG/DL (ref 0–149)
VLDLC SERPL CALC-MCNC: 25 MG/DL (ref 5–40)
WBC # BLD AUTO: 10 X10E3/UL (ref 3.4–10.8)

## 2018-07-27 NOTE — TELEPHONE ENCOUNTER
From: Lilliana Heading  To: Bonifacio Zavala MD  Sent: 2018 4:40 PM EDT  Subject: Medication Renewal Request    Original authorizing provider: MD Lilliana De Leon would like a refill of the following medications:  empagliflozin-linagliptin (GLYXAMBI) 25-5 mg tab Bonifacio Zavala MD]    Preferred pharmacy: [address] [place] [address] - Rojelio Newman    Comment:  The [facility] Financial Approval has  and needs to be resubmitted for approval for [facility] [place] prescription. I am currently low on medication (have three pills remaining), Please expedite.  Thanks

## 2018-07-27 NOTE — TELEPHONE ENCOUNTER
Last Visit: 6/18-Lucille  Next Appt: 9/18-Lucille  Previous Refill Encounter: 11/20/17-90+6    Requested Prescriptions     Pending Prescriptions Disp Refills    empagliflozin-linagliptin (GLYXAMBI) 25-5 mg tab 90 Tab 6     Sig: Take 1 Tab by mouth daily.

## 2018-10-03 ENCOUNTER — OFFICE VISIT (OUTPATIENT)
Dept: FAMILY MEDICINE CLINIC | Age: 48
End: 2018-10-03

## 2018-10-03 VITALS
RESPIRATION RATE: 16 BRPM | HEIGHT: 72 IN | DIASTOLIC BLOOD PRESSURE: 72 MMHG | BODY MASS INDEX: 42.66 KG/M2 | SYSTOLIC BLOOD PRESSURE: 129 MMHG | TEMPERATURE: 97.9 F | HEART RATE: 68 BPM | OXYGEN SATURATION: 96 % | WEIGHT: 315 LBS

## 2018-10-03 DIAGNOSIS — Z12.11 SCREEN FOR COLON CANCER: ICD-10-CM

## 2018-10-03 DIAGNOSIS — Z23 ENCOUNTER FOR IMMUNIZATION: ICD-10-CM

## 2018-10-03 LAB — HBA1C MFR BLD HPLC: 8.2 %

## 2018-10-03 NOTE — MR AVS SNAPSHOT
[facility] [facility] [address] [phone] Patient: Kristen Bullard MRN: [id number] [facility] Visit Information Date & Time Provider Department Dept. Phone Encounter #  
 10/3/2018 10:45 AM C[facility] Horta MD [address] [facility] [phone] [id number] Follow-up Instructions Return in about 3 months (around 1/3/2019), or if symptoms worsen or fail to improve. Upcoming Health Maintenance Date Due  
 EYE EXAM RETINAL OR DILATED Q1 7/5/2017 Influenza Age 5 to Adult 8/1/2018 Shingrix Vaccine Age 50> (1 of 2) 8/4/2018 FOBT Q 1 YEAR AGE 50-75 8/4/2018 FOOT EXAM Q1 11/20/2018 HEMOGLOBIN A1C Q6M 12/18/2018 MICROALBUMIN Q1 6/18/2019 LIPID PANEL Q1 6/18/2019 DTaP/Tdap/Td series (2 - Td) 2/21/2024 Allergies as of 10/3/2018  Review Complete On: 10/3/2018 By: Joan Foy LPN Severity Noted Reaction Type Reactions Wheat  01/12/2015    Hives Current Immunizations  Reviewed on 11/9/2015 Name Date Influenza Vaccine 11/1/2014 Influenza Vaccine (Quad) PF 11/20/2017 Influenza Vaccine PF  Deferred (Patient Refused) Influenza Vaccine Split 1/4/2012, 9/28/2010 Pneumococcal Polysaccharide (PPSV-23) 2/21/2014 Tdap 2/21/2014 Not reviewed this visit You Were Diagnosed With   
  
 Codes Comments Uncontrolled type 2 diabetes mellitus without complication, without long-term current use of insulin [facility]    -  Primary ICD-10-CM: E11.65 ICD-9-CM: 250.02 Encounter for immunization     ICD-10-CM: Q38 ICD-9-CM: V03.89 Screen for colon cancer     ICD-10-CM: Z12.11 ICD-9-CM: V76.51 Vitals BP Pulse Temp Resp Height(growth percentile) Weight(growth percentile) 129/72 (BP 1 Location: Left arm, BP Patient Position: At rest) 68 97.9 °F (36.6 °C) (Oral) 16 6' (1.829 m) (!) 391 lb (177.4 kg) SpO2 BMI Smoking Status 96% 53.03 kg/m2 Never Smoker Vitals History BMI and BSA Data Body Mass Index Body Surface Area 53.03 kg/m 2 3 m 2 Preferred Pharmacy Pharmacy Name Phone [facility] [address] [place] [address] [place] [facility] [phone] Your Updated Medication List  
  
   
This list is accurate as of 10/3/18 11:44 AM.  Always use your most recent med list.  
  
  
  
  
 albuterol 90 mcg/actuation inhaler Commonly known as:  PROAIR HFA Take 1 Puff by inhalation every six (6) hours as needed for Wheezing or Shortness of Breath. aspirin 81 mg tablet Take 1 Tab by mouth daily. betamethasone dipropionate 0.05 % topical cream  
Commonly known as:  Sahil Reyes Apply  to affected area two (2) times a day. diclofenac 1 % Gel Commonly known as:  VOLTAREN Apply 2 g to affected area four (4) times daily. empagliflozin-linagliptin 25-5 mg Tab Commonly known as:  GLYXAMBI Take 1 Tab by mouth daily. ergocalciferol 50,000 unit capsule Commonly known as:  ERGOCALCIFEROL  
TAKE 1 CAPSULE BY MOUTH EVERY 7 DAYS  Indications: hypoparathyroidism  
  
 glipiZIDE 10 mg tablet Commonly known as:  GLUCOTROL  
TAKE ONE & ONE-HALF TABLETS BY MOUTH TWICE DAILY  
  
 glucose blood VI test strips strip Commonly known as:  ASCENSIA AUTODISC VI, ONE TOUCH ULTRA TEST VI  
by Does Not Apply route. As directed  
  
 ketoconazole 2 % topical cream  
Commonly known as:  NIZORAL  
APPLY TOPICALLY TO AFFECTED AREA 2 TIMES DAILY Lancets Misc  
by Does Not Apply route. As directed  
  
 metFORMIN 1,000 mg tablet Commonly known as:  GLUCOPHAGE  
TAKE ONE TABLET BY MOUTH TWICE DAILY WITH MEALS  
  
 metOLazone 5 mg tablet Commonly known as:  ZAROXOLYN  
TAKE ONE TABLET BY MOUTH ONCE DAILY Olmesartan-amLODIPine-HCTZ 40-10-25 mg Tab Commonly known as:  TRIBENZOR  
TAKE ONE TABLET BY MOUTH EVERY DAY  
  
 potassium chloride 20 mEq tablet Commonly known as:  K-DUR, KLOR-CON Take 1 Tab by mouth three (3) times daily. Indications: hypokalemia  
  
 varicella-zoster recombinant (PF) 50 mcg/0.5 mL Susr injection Commonly known as:  SHINGRIX  
0.5 mL by IntraMUSCular route once for 1 dose. Prescriptions Printed Refills  
 varicella-zoster recombinant, PF, (SHINGRIX) 50 mcg/0.5 mL susr injection 0 Si.5 mL by IntraMUSCular route once for 1 dose. Class: Print Route: IntraMUSCular We Performed the Following AMB POC HEMOGLOBIN A1C [77894 CPT(R)] OCCULT BLOOD IMMUNOASSAY,DIAGNOSTIC [76933 CPT(R)] Follow-up Instructions Return in about 3 months (around 1/3/2019), or if symptoms worsen or fail to improve. Patient Instructions Starting a Weight Loss Plan: Care Instructions Your Care Instructions If you are thinking about losing weight, it can be hard to know where to start. Your doctor can help you set up a weight loss plan that best meets your needs. You may want to take a class on nutrition or exercise, or join a weight loss support group. If you have questions about how to make changes to your eating or exercise habits, ask your doctor about seeing a registered dietitian or an exercise specialist. 
It can be a big challenge to lose weight. But you do not have to make huge changes at once. Make small changes, and stick with them. When those changes become habit, add a few more changes. If you do not think you are ready to make changes right now, try to pick a date in the future. Make an appointment to see your doctor to discuss whether the time is right for you to start a plan. Follow-up care is a key part of your treatment and safety. Be sure to make and go to all appointments, and call your doctor if you are having problems. It's also a good idea to know your test results and keep a list of the medicines you take. How can you care for yourself at home? · Set realistic goals. Many people expect to lose much more weight than is likely. A weight loss of 5% to 10% of your body weight may be enough to improve your health. · Get family and friends involved to provide support. Talk to them about why you are trying to lose weight, and ask them to help. They can help by participating in exercise and having meals with you, even if they may be eating something different. · Find what works best for you. If you do not have time or do not like to cook, a program that offers meal replacement bars or shakes may be better for you. Or if you like to prepare meals, finding a plan that includes daily menus and recipes may be best. 
· Ask your doctor about other health professionals who can help you achieve your weight loss goals. ¨ A dietitian can help you make healthy changes in your diet. ¨ An exercise specialist or  can help you develop a safe and effective exercise program. 
¨ A counselor or psychiatrist can help you cope with issues such as depression, anxiety, or family problems that can make it hard to focus on weight loss. · Consider joining a support group for people who are trying to lose weight. Your doctor can suggest groups in your area. Where can you learn more? Go to http://alma-isabelle.info/. Enter U186 in the search box to learn more about \"Starting a Weight Loss Plan: Care Instructions. \" Current as of: October 9, 2017 Content Version: 11.7 © 4969-6073 [employer]. Care instructions adapted under license by [employer] (which disclaims liability or warranty for this information). If you have questions about a medical condition or this instruction, always ask your healthcare professional. [place] [address] any warranty or liability for your use of this information. Learning About Diabetes Food Guidelines Your Care Instructions Meal planning is important to manage diabetes. It helps keep your blood sugar at a target level (which you set with your doctor). You don't have to eat special foods. You can eat what your family eats, including sweets once in a while. But you do have to pay attention to how often you eat and how much you eat of certain foods. You may want to work with a dietitian or a certified diabetes educator (CDE) to help you plan meals and snacks. A dietitian or CDE can also help you lose weight if that is one of your goals. What should you know about eating carbs? Managing the amount of carbohydrate (carbs) you eat is an important part of healthy meals when you have diabetes. Carbohydrate is found in many foods. · Learn which foods have carbs. And learn the amounts of carbs in different foods. ¨ Bread, cereal, pasta, and rice have about 15 grams of carbs in a serving. A serving is 1 slice of bread (1 ounce), ½ cup of cooked cereal, or 1/3 cup of cooked pasta or rice. ¨ Fruits have 15 grams of carbs in a serving. A serving is 1 small fresh fruit, such as an apple or orange; ½ of a banana; ½ cup of cooked or canned fruit; ½ cup of fruit juice; 1 cup of melon or raspberries; or 2 tablespoons of dried fruit. ¨ Milk and no-sugar-added yogurt have 15 grams of carbs in a serving. A serving is 1 cup of milk or 2/3 cup of no-sugar-added yogurt. ¨ Starchy vegetables have 15 grams of carbs in a serving. A serving is ½ cup of mashed potatoes or sweet potato; 1 cup winter squash; ½ of a small baked potato; ½ cup of cooked beans; or ½ cup cooked corn or green peas. · Learn how much carbs to eat each day and at each meal. A dietitian or CDE can teach you how to keep track of the amount of carbs you eat. This is called carbohydrate counting. · If you are not sure how to count carbohydrate grams, use the Plate Method to plan meals.  It is a good, quick way to make sure that you have a balanced meal. It also helps you spread carbs throughout the day. ¨ Divide your plate by types of foods. Put non-starchy vegetables on half the plate, meat or other protein food on one-quarter of the plate, and a grain or starchy vegetable in the final quarter of the plate. To this you can add a small piece of fruit and 1 cup of milk or yogurt, depending on how many carbs you are supposed to eat at a meal. 
· Try to eat about the same amount of carbs at each meal. Do not \"save up\" your daily allowance of carbs to eat at one meal. 
· Proteins have very little or no carbs per serving. Examples of proteins are beef, chicken, turkey, fish, eggs, tofu, cheese, cottage cheese, and peanut butter. A serving size of meat is 3 ounces, which is about the size of a deck of cards. Examples of meat substitute serving sizes (equal to 1 ounce of meat) are 1/4 cup of cottage cheese, 1 egg, 1 tablespoon of peanut butter, and ½ cup of tofu. How can you eat out and still eat healthy? · Learn to estimate the serving sizes of foods that have carbohydrate. If you measure food at home, it will be easier to estimate the amount in a serving of restaurant food. · If the meal you order has too much carbohydrate (such as potatoes, corn, or baked beans), ask to have a low-carbohydrate food instead. Ask for a salad or green vegetables. · If you use insulin, check your blood sugar before and after eating out to help you plan how much to eat in the future. · If you eat more carbohydrate at a meal than you had planned, take a walk or do other exercise. This will help lower your blood sugar. What else should you know? · Limit saturated fat, such as the fat from meat and dairy products. This is a healthy choice because people who have diabetes are at higher risk of heart disease. So choose lean cuts of meat and nonfat or low-fat dairy products. Use olive or canola oil instead of butter or shortening when cooking. · Don't skip meals. Your blood sugar may drop too low if you skip meals and take insulin or certain medicines for diabetes. · Check with your doctor before you drink alcohol. Alcohol can cause your blood sugar to drop too low. Alcohol can also cause a bad reaction if you take certain diabetes medicines. Follow-up care is a key part of your treatment and safety. Be sure to make and go to all appointments, and call your doctor if you are having problems. It's also a good idea to know your test results and keep a list of the medicines you take. Where can you learn more? Go to http://alma-isabelle.info/. Enter B581 in the search box to learn more about \"Learning About Diabetes Food Guidelines. \" Current as of: December 7, 2017 Content Version: 11.7 © 5352-4979 [employer]. Care instructions adapted under license by [employer] (which disclaims liability or warranty for this information). If you have questions about a medical condition or this instruction, always ask your healthcare professional. [place] [address] any warranty or liability for your use of this information. Introducing [facility]! Dear Wesley Vergara: 
Thank you for requesting a RideApart account. Our records indicate that you already have an active RideApart account. You can access your account anytime at [email or web address]. [employer]/[facility] Did you know that you can access your hospital and ER discharge instructions at any time in RideApart? You can also review all of your test results from your hospital stay or ER visit. Additional Information If you have questions, please visit the Frequently Asked Questions section of the RideApart website at [email or web address]/. Remember, RideApart is NOT to be used for urgent needs. For medical emergencies, dial 911. Now available from your iPhone and Android! Please provide this summary of care documentation to your next provider. Your primary care clinician is listed as Sigrid Henry. If you have any questions after today's visit, please call [phone].

## 2018-10-03 NOTE — PATIENT INSTRUCTIONS
Starting a Weight Loss Plan: Care Instructions Your Care Instructions If you are thinking about losing weight, it can be hard to know where to start. Your doctor can help you set up a weight loss plan that best meets your needs. You may want to take a class on nutrition or exercise, or join a weight loss support group. If you have questions about how to make changes to your eating or exercise habits, ask your doctor about seeing a registered dietitian or an exercise specialist. 
It can be a big challenge to lose weight. But you do not have to make huge changes at once. Make small changes, and stick with them. When those changes become habit, add a few more changes. If you do not think you are ready to make changes right now, try to pick a date in the future. Make an appointment to see your doctor to discuss whether the time is right for you to start a plan. Follow-up care is a key part of your treatment and safety. Be sure to make and go to all appointments, and call your doctor if you are having problems. It's also a good idea to know your test results and keep a list of the medicines you take. How can you care for yourself at home? · Set realistic goals. Many people expect to lose much more weight than is likely. A weight loss of 5% to 10% of your body weight may be enough to improve your health. · Get family and friends involved to provide support. Talk to them about why you are trying to lose weight, and ask them to help. They can help by participating in exercise and having meals with you, even if they may be eating something different. · Find what works best for you. If you do not have time or do not like to cook, a program that offers meal replacement bars or shakes may be better for you. Or if you like to prepare meals, finding a plan that includes daily menus and recipes may be best. 
· Ask your doctor about other health professionals who can help you achieve your weight loss goals. ¨ A dietitian can help you make healthy changes in your diet. ¨ An exercise specialist or  can help you develop a safe and effective exercise program. 
¨ A counselor or psychiatrist can help you cope with issues such as depression, anxiety, or family problems that can make it hard to focus on weight loss. · Consider joining a support group for people who are trying to lose weight. Your doctor can suggest groups in your area. Where can you learn more? Go to http://alma-isabelle.info/. Enter V481 in the search box to learn more about \"Starting a Weight Loss Plan: Care Instructions. \" Current as of: October 9, 2017 Content Version: 11.7 © 6413-1773 [employer]GRABR. Care instructions adapted under license by [employer] (which disclaims liability or warranty for this information). If you have questions about a medical condition or this instruction, always ask your healthcare professional. [place] [address] any warranty or liability for your use of this information. Learning About Diabetes Food Guidelines Your Care Instructions Meal planning is important to manage diabetes. It helps keep your blood sugar at a target level (which you set with your doctor). You don't have to eat special foods. You can eat what your family eats, including sweets once in a while. But you do have to pay attention to how often you eat and how much you eat of certain foods. You may want to work with a dietitian or a certified diabetes educator (CDE) to help you plan meals and snacks. A dietitian or CDE can also help you lose weight if that is one of your goals. What should you know about eating carbs? Managing the amount of carbohydrate (carbs) you eat is an important part of healthy meals when you have diabetes. Carbohydrate is found in many foods. · Learn which foods have carbs. And learn the amounts of carbs in different foods. ¨ Bread, cereal, pasta, and rice have about 15 grams of carbs in a serving. A serving is 1 slice of bread (1 ounce), ½ cup of cooked cereal, or 1/3 cup of cooked pasta or rice. ¨ Fruits have 15 grams of carbs in a serving. A serving is 1 small fresh fruit, such as an apple or orange; ½ of a banana; ½ cup of cooked or canned fruit; ½ cup of fruit juice; 1 cup of melon or raspberries; or 2 tablespoons of dried fruit. ¨ Milk and no-sugar-added yogurt have 15 grams of carbs in a serving. A serving is 1 cup of milk or 2/3 cup of no-sugar-added yogurt. ¨ Starchy vegetables have 15 grams of carbs in a serving. A serving is ½ cup of mashed potatoes or sweet potato; 1 cup winter squash; ½ of a small baked potato; ½ cup of cooked beans; or ½ cup cooked corn or green peas. · Learn how much carbs to eat each day and at each meal. A dietitian or CDE can teach you how to keep track of the amount of carbs you eat. This is called carbohydrate counting. · If you are not sure how to count carbohydrate grams, use the Plate Method to plan meals. It is a good, quick way to make sure that you have a balanced meal. It also helps you spread carbs throughout the day. ¨ Divide your plate by types of foods. Put non-starchy vegetables on half the plate, meat or other protein food on one-quarter of the plate, and a grain or starchy vegetable in the final quarter of the plate. To this you can add a small piece of fruit and 1 cup of milk or yogurt, depending on how many carbs you are supposed to eat at a meal. 
· Try to eat about the same amount of carbs at each meal. Do not \"save up\" your daily allowance of carbs to eat at one meal. 
· Proteins have very little or no carbs per serving. Examples of proteins are beef, chicken, turkey, fish, eggs, tofu, cheese, cottage cheese, and peanut butter. A serving size of meat is 3 ounces, which is about the size of a deck of cards.  Examples of meat substitute serving sizes (equal to 1 ounce of meat) are 1/4 cup of cottage cheese, 1 egg, 1 tablespoon of peanut butter, and ½ cup of tofu. How can you eat out and still eat healthy? · Learn to estimate the serving sizes of foods that have carbohydrate. If you measure food at home, it will be easier to estimate the amount in a serving of restaurant food. · If the meal you order has too much carbohydrate (such as potatoes, corn, or baked beans), ask to have a low-carbohydrate food instead. Ask for a salad or green vegetables. · If you use insulin, check your blood sugar before and after eating out to help you plan how much to eat in the future. · If you eat more carbohydrate at a meal than you had planned, take a walk or do other exercise. This will help lower your blood sugar. What else should you know? · Limit saturated fat, such as the fat from meat and dairy products. This is a healthy choice because people who have diabetes are at higher risk of heart disease. So choose lean cuts of meat and nonfat or low-fat dairy products. Use olive or canola oil instead of butter or shortening when cooking. · Don't skip meals. Your blood sugar may drop too low if you skip meals and take insulin or certain medicines for diabetes. · Check with your doctor before you drink alcohol. Alcohol can cause your blood sugar to drop too low. Alcohol can also cause a bad reaction if you take certain diabetes medicines. Follow-up care is a key part of your treatment and safety. Be sure to make and go to all appointments, and call your doctor if you are having problems. It's also a good idea to know your test results and keep a list of the medicines you take. Where can you learn more? Go to http://alma-isabelle.info/. Enter Y052 in the search box to learn more about \"Learning About Diabetes Food Guidelines. \" Current as of: December 7, 2017 Content Version: 11.7 © 3408-9299 [employer].  Care instructions adapted under license by [address] (which disclaims liability or warranty for this information). If you have questions about a medical condition or this instruction, always ask your healthcare professional. [address] any warranty or liability for your use of this information.

## 2018-10-03 NOTE — PROGRESS NOTES
Name and  verified Chief Complaint Patient presents with  Diabetes Patient educated on the parts of a diabetes care plan 1. Meal plan 2. Plan for staying active 3. Diabetes medicine plan 4. Plan for checking blood sugar as ordered by Dr. Basim Rader 5. Schedule for diabetes follow up appt as recommended by provider Health Maintenance reviewed-discussed with patient. 1. Have you been to the ER, urgent care clinic since your last visit? Hospitalized since your last visit? No 
 
2. Have you seen or consulted any other health care providers outside of the [facility] since your last visit? Include any pap smears or colon screening.  No

## 2018-10-15 LAB — HEMOCCULT STL QL IA: NEGATIVE

## 2018-11-23 DIAGNOSIS — I10 ESSENTIAL HYPERTENSION: ICD-10-CM

## 2018-11-26 RX ORDER — OLMESARTAN MEDOXOMIL, AMLODIPINE AND HYDROCHLOROTHIAZIDE TABLET 40/10/25 MG 40; 10; 25 MG/1; MG/1; MG/1
TABLET ORAL
Qty: 30 TAB | Refills: 0 | Status: SHIPPED | OUTPATIENT
Start: 2018-11-26 | End: 2019-01-12 | Stop reason: ALTCHOICE

## 2019-01-07 ENCOUNTER — OFFICE VISIT (OUTPATIENT)
Dept: FAMILY MEDICINE CLINIC | Age: 49
End: 2019-01-07

## 2019-01-07 VITALS
HEART RATE: 63 BPM | OXYGEN SATURATION: 97 % | HEIGHT: 72 IN | TEMPERATURE: 98.3 F | SYSTOLIC BLOOD PRESSURE: 145 MMHG | WEIGHT: 315 LBS | RESPIRATION RATE: 20 BRPM | DIASTOLIC BLOOD PRESSURE: 91 MMHG | BODY MASS INDEX: 42.66 KG/M2

## 2019-01-07 DIAGNOSIS — E78.2 MIXED HYPERLIPIDEMIA: ICD-10-CM

## 2019-01-07 DIAGNOSIS — J45.21 MILD INTERMITTENT ASTHMA WITH ACUTE EXACERBATION: ICD-10-CM

## 2019-01-07 DIAGNOSIS — E03.8 TSH (THYROID-STIMULATING HORMONE DEFICIENCY): ICD-10-CM

## 2019-01-07 DIAGNOSIS — R60.0 BILATERAL EDEMA OF LOWER EXTREMITY: ICD-10-CM

## 2019-01-07 DIAGNOSIS — G47.30 SLEEP APNEA, UNSPECIFIED TYPE: ICD-10-CM

## 2019-01-07 DIAGNOSIS — I10 ESSENTIAL HYPERTENSION: ICD-10-CM

## 2019-01-07 DIAGNOSIS — E55.9 VITAMIN D DEFICIENCY: ICD-10-CM

## 2019-01-07 DIAGNOSIS — Z23 ENCOUNTER FOR IMMUNIZATION: ICD-10-CM

## 2019-01-07 LAB — HBA1C MFR BLD HPLC: 8.4 %

## 2019-01-07 RX ORDER — METFORMIN HYDROCHLORIDE 1000 MG/1
TABLET ORAL
Qty: 180 TAB | Refills: 4 | Status: SHIPPED | OUTPATIENT
Start: 2019-01-07 | End: 2020-11-16 | Stop reason: SDUPTHER

## 2019-01-07 RX ORDER — ALBUTEROL SULFATE 90 UG/1
1 AEROSOL, METERED RESPIRATORY (INHALATION)
Qty: 1 INHALER | Refills: 11 | Status: SHIPPED | OUTPATIENT
Start: 2019-01-07 | End: 2021-06-13 | Stop reason: SDUPTHER

## 2019-01-07 RX ORDER — ERGOCALCIFEROL 1.25 MG/1
CAPSULE ORAL
Qty: 12 CAP | Refills: 6 | Status: SHIPPED | OUTPATIENT
Start: 2019-01-07 | End: 2020-03-16

## 2019-01-07 RX ORDER — GLIPIZIDE 10 MG/1
TABLET ORAL
Qty: 270 TAB | Refills: 4 | Status: SHIPPED | OUTPATIENT
Start: 2019-01-07 | End: 2020-03-16

## 2019-01-07 RX ORDER — POTASSIUM CHLORIDE 20 MEQ/1
20 TABLET, EXTENDED RELEASE ORAL 3 TIMES DAILY
Qty: 270 TAB | Refills: 2 | Status: SHIPPED | OUTPATIENT
Start: 2019-01-07 | End: 2021-11-15 | Stop reason: SDUPTHER

## 2019-01-07 NOTE — PATIENT INSTRUCTIONS
Learning About Diabetes Food Guidelines Your Care Instructions Meal planning is important to manage diabetes. It helps keep your blood sugar at a target level (which you set with your doctor). You don't have to eat special foods. You can eat what your family eats, including sweets once in a while. But you do have to pay attention to how often you eat and how much you eat of certain foods. You may want to work with a dietitian or a certified diabetes educator (CDE) to help you plan meals and snacks. A dietitian or CDE can also help you lose weight if that is one of your goals. What should you know about eating carbs? Managing the amount of carbohydrate (carbs) you eat is an important part of healthy meals when you have diabetes. Carbohydrate is found in many foods. · Learn which foods have carbs. And learn the amounts of carbs in different foods. ? Bread, cereal, pasta, and rice have about 15 grams of carbs in a serving. A serving is 1 slice of bread (1 ounce), ½ cup of cooked cereal, or 1/3 cup of cooked pasta or rice. ? Fruits have 15 grams of carbs in a serving. A serving is 1 small fresh fruit, such as an apple or orange; ½ of a banana; ½ cup of cooked or canned fruit; ½ cup of fruit juice; 1 cup of melon or raspberries; or 2 tablespoons of dried fruit. ? Milk and no-sugar-added yogurt have 15 grams of carbs in a serving. A serving is 1 cup of milk or 2/3 cup of no-sugar-added yogurt. ? Starchy vegetables have 15 grams of carbs in a serving. A serving is ½ cup of mashed potatoes or sweet potato; 1 cup winter squash; ½ of a small baked potato; ½ cup of cooked beans; or ½ cup cooked corn or green peas. · Learn how much carbs to eat each day and at each meal. A dietitian or CDE can teach you how to keep track of the amount of carbs you eat. This is called carbohydrate counting.  
· If you are not sure how to count carbohydrate grams, use the Plate Method to plan meals. It is a good, quick way to make sure that you have a balanced meal. It also helps you spread carbs throughout the day. ? Divide your plate by types of foods. Put non-starchy vegetables on half the plate, meat or other protein food on one-quarter of the plate, and a grain or starchy vegetable in the final quarter of the plate. To this you can add a small piece of fruit and 1 cup of milk or yogurt, depending on how many carbs you are supposed to eat at a meal. 
· Try to eat about the same amount of carbs at each meal. Do not \"save up\" your daily allowance of carbs to eat at one meal. 
· Proteins have very little or no carbs per serving. Examples of proteins are beef, chicken, turkey, fish, eggs, tofu, cheese, cottage cheese, and peanut butter. A serving size of meat is 3 ounces, which is about the size of a deck of cards. Examples of meat substitute serving sizes (equal to 1 ounce of meat) are 1/4 cup of cottage cheese, 1 egg, 1 tablespoon of peanut butter, and ½ cup of tofu. How can you eat out and still eat healthy? · Learn to estimate the serving sizes of foods that have carbohydrate. If you measure food at home, it will be easier to estimate the amount in a serving of restaurant food. · If the meal you order has too much carbohydrate (such as potatoes, corn, or baked beans), ask to have a low-carbohydrate food instead. Ask for a salad or green vegetables. · If you use insulin, check your blood sugar before and after eating out to help you plan how much to eat in the future. · If you eat more carbohydrate at a meal than you had planned, take a walk or do other exercise. This will help lower your blood sugar. What else should you know? · Limit saturated fat, such as the fat from meat and dairy products. This is a healthy choice because people who have diabetes are at higher risk of heart disease.  So choose lean cuts of meat and nonfat or low-fat dairy products. Use olive or canola oil instead of butter or shortening when cooking. · Don't skip meals. Your blood sugar may drop too low if you skip meals and take insulin or certain medicines for diabetes. · Check with your doctor before you drink alcohol. Alcohol can cause your blood sugar to drop too low. Alcohol can also cause a bad reaction if you take certain diabetes medicines. Follow-up care is a key part of your treatment and safety. Be sure to make and go to all appointments, and call your doctor if you are having problems. It's also a good idea to know your test results and keep a list of the medicines you take. Where can you learn more? Go to http://almaWebcollageisabelle.info/. Enter Q584 in the search box to learn more about \"Learning About Diabetes Food Guidelines. \" Current as of: December 7, 2017 Content Version: 11.8 © 2564-8389 [employer]. Care instructions adapted under license by [employer] (which disclaims liability or warranty for this information). If you have questions about a medical condition or this instruction, always ask your healthcare professional. [address] any warranty or liability for your use of this information. Learning About High Blood Pressure What is high blood pressure? Blood pressure is a measure of how hard the blood pushes against the walls of your arteries. It's normal for blood pressure to go up and down throughout the day, but if it stays up, you have high blood pressure. Another name for high blood pressure is hypertension. Two numbers tell you your blood pressure. The first number is the systolic pressure. It shows how hard the blood pushes when your heart is pumping. The second number is the diastolic pressure. It shows how hard the blood pushes between heartbeats, when your heart is relaxed and filling with blood.  
A blood pressure of less than 120/80 (say \"120 over 80\") is ideal for an adult. High blood pressure is 130/80 or higher. You have high blood pressure if your top number is 130 or higher or your bottom number is 80 or higher, or both. What happens when you have high blood pressure? · Blood flows through your arteries with too much force. Over time, this damages the walls of your arteries. But you can't feel it. High blood pressure usually doesn't cause symptoms. · Fat and calcium start to build up in your arteries. This buildup is called plaque. Plaque makes your arteries narrower and stiffer. Blood can't flow through them as easily. · This lack of good blood flow starts to damage some of the organs in your body. This can lead to problems such as coronary artery disease and heart attack, heart failure, stroke, kidney failure, and eye damage. How can you prevent high blood pressure? · Stay at a healthy weight. · Try to limit how much sodium you eat to less than 2,300 milligrams (mg) a day. If you limit your sodium to 1,500 mg a day, you can lower your blood pressure even more. ? Buy foods that are labeled \"unsalted,\" \"sodium-free,\" or \"low-sodium. \" Foods labeled \"reduced-sodium\" and \"light sodium\" may still have too much sodium. ? Flavor your food with garlic, lemon juice, onion, vinegar, herbs, and spices instead of salt. Do not use soy sauce, steak sauce, onion salt, garlic salt, mustard, or ketchup on your food. ? Use less salt (or none) when recipes call for it. You can often use half the salt a recipe calls for without losing flavor. · Be physically active. Get at least 30 minutes of exercise on most days of the week. Walking is a good choice. You also may want to do other activities, such as running, swimming, cycling, or playing tennis or team sports. · Limit alcohol to 2 drinks a day for men and 1 drink a day for women. · Eat plenty of fruits, vegetables, and low-fat dairy products. Eat less saturated and total fats. How is high blood pressure treated? · Your doctor will suggest making lifestyle changes. For example, your doctor may ask you to eat healthy foods, quit smoking, lose extra weight, and be more active. · If lifestyle changes don't help enough, your doctor may recommend that you take medicine. · When blood pressure is very high, medicines are needed to lower it. Follow-up care is a key part of your treatment and safety. Be sure to make and go to all appointments, and call your doctor if you are having problems. It's also a good idea to know your test results and keep a list of the medicines you take. Where can you learn more? Go to http://alma-isabelle.info/. Enter P501 in the search box to learn more about \"Learning About High Blood Pressure. \" Current as of: December 6, 2017 Content Version: 11.8 © 7254-1234 [employer]. Care instructions adapted under license by [employer] (which disclaims liability or warranty for this information). If you have questions about a medical condition or this instruction, always ask your healthcare professional. [place] [address] any warranty or liability for your use of this information. Plantar Fasciitis: Exercises Your Care Instructions Here are some examples of typical rehabilitation exercises for your condition. Start each exercise slowly. Ease off the exercise if you start to have pain. Your doctor or physical therapist will tell you when you can start these exercises and which ones will work best for you. How to do the exercises Towel stretch 1. Sit with your legs extended and knees straight. 2. Place a towel around your foot just under the toes. 3. Hold each end of the towel in each hand, with your hands above your knees. 4. Pull back with the towel so that your foot stretches toward you. 5. Hold the position for at least 15 to 30 seconds. 6. Repeat 2 to 4 times a session, up to 5 sessions a day. Calf stretch 1. Stand facing a wall with your hands on the wall at about eye level. Put the leg you want to stretch about a step behind your other leg. 2. Keeping your back heel on the floor, bend your front knee until you feel a stretch in the back leg. 3. Hold the stretch for 15 to 30 seconds. Repeat 2 to 4 times. Plantar fascia and calf stretch 1. Stand on a step as shown above. Be sure to hold on to the banister. 2. Slowly let your heels down over the edge of the step as you relax your calf muscles. You should feel a gentle stretch across the bottom of your foot and up the back of your leg to your knee. 3. Hold the stretch about 15 to 30 seconds, and then tighten your calf muscle a little to bring your heel back up to the level of the step. Repeat 2 to 4 times. Towel curls 1. While sitting, place your foot on a towel on the floor and scrunch the towel toward you with your toes. 2. Then, also using your toes, push the towel away from you. [place] pickups 1. Put marbles on the floor next to a cup. 
2. Using your toes, try to lift the marbles up from the floor and put them in the cup. Follow-up care is a key part of your treatment and safety. Be sure to make and go to all appointments, and call your doctor if you are having problems. It's also a good idea to know your test results and keep a list of the medicines you take. Where can you learn more? Go to http://alma-isabelle.info/. Charley Alberts in the search box to learn more about \"Plantar Fasciitis: Exercises. \" Current as of: November 29, 2017 Content Version: 11.8 © 1712-6829 [employer]. Care instructions adapted under license by [employer] (which disclaims liability or warranty for this information). If you have questions about a medical condition or this instruction, always ask your healthcare professional. [address] any warranty or liability for your use of this information.

## 2019-01-07 NOTE — PROGRESS NOTES
Name and  verified Chief Complaint Patient presents with  Diabetes f/u  Hypertension f/u Health Maintenance reviewed-discussed with patient. PATIENT DECLINED FLU VACCINE. 1. Have you been to the ER, urgent care clinic since your last visit? Hospitalized since your last visit? no 
 
2. Have you seen or consulted any other health care providers outside of the [address] [place] [address] since your last visit? Include any pap smears or colon screening.  no

## 2019-01-08 LAB — URATE SERPL-MCNC: 9.3 MG/DL (ref 3.7–8.6)

## 2019-01-08 NOTE — PROGRESS NOTES
HISTORY OF PRESENT ILLNESS Becky Rosa is a 46 y.o. male. HPI patient presents with uncontrolled diabetic state unwilling to take any insulin injection therapy in fact he is against any injection for better outcome patient so far has been working on her diet and weight and not willing to have any change of medication unfortunately has been having small joint pains recently he has bilateral foot deformities since birth he is flat footed but on the other side he is taking blood pressure medication that could increase his uric acid level in addition patient does snore at night and he does not feel sleepy during the day does some taking and restlessness at night and has not done any sleep study so far patient is willing to get further study done to rule out obstructive sleep apnea DMtype II Compliant w/ meds, having nodiabetic diet, and not doing much of daily exercise, he does not obtain home glucose , +++Rf needed for today. Denies any tingling sensation, polyurea and polydipsia, last a1c was at target 8.3%%, Last urine microalbumin 2018 and was abnormal  
Feeling better since the last visit. Current Outpatient Medications Medication Sig Dispense Refill  ergocalciferol (ERGOCALCIFEROL) 50,000 unit capsule TAKE 1 CAPSULE BY MOUTH EVERY 7 DAYS 12 Cap 6  potassium chloride (K-DUR, KLOR-CON) 20 mEq tablet Take 1 Tab by mouth three (3) times daily. 270 Tab 2  
 albuterol (PROAIR HFA) 90 mcg/actuation inhaler Take 1 Puff by inhalation every six (6) hours as needed for Wheezing or Shortness of Breath. 1 Inhaler 11  
 empagliflozin-linagliptin (GLYXAMBI) 25-5 mg tab Take 1 Tab by mouth daily. 90 Tab 6  
 glipiZIDE (GLUCOTROL) 10 mg tablet TAKE ONE & ONE-HALF TABLETS BY MOUTH TWICE DAILY 270 Tab 4  
 metFORMIN (GLUCOPHAGE) 1,000 mg tablet TAKE ONE TABLET BY MOUTH TWICE DAILY WITH MEALS 180 Tab 4  
 orlistat (XENICAL) 120 mg capsule Take 120 mg by mouth three (3) times daily (with meals). 90 Cap 6  Olmesartan-amLODIPine-HCTZ (TRIBENZOR) 40-10-25 mg tab TAKE ONE TABLET BY MOUTH EVERY DAY 30 Tab 0  
 ketoconazole (NIZORAL) 2 % topical cream APPLY TOPICALLY TO AFFECTED AREA 2 TIMES DAILY 60 oz 6  
 metOLazone (ZAROXOLYN) 5 mg tablet TAKE ONE TABLET BY MOUTH ONCE DAILY 90 Tab 3  
 aspirin 81 mg tablet Take 1 Tab by mouth daily. 30 Tab 11  
 glucose blood VI test strips (ASCENSIA AUTODISC VI, ONE TOUCH ULTRA TEST VI) strip by Does Not Apply route. As directed 1 Package 11  Lancets Misc by Does Not Apply route. As directed 1 Package 11  
 diclofenac (VOLTAREN) 1 % gel Apply 2 g to affected area four (4) times daily. (Patient taking differently: Apply 2 g to affected area as needed.) 100 g 2  
 betamethasone dipropionate (DIPROSONE) 0.05 % topical cream Apply  to affected area two (2) times a day. (Patient not taking: Reported on 6/18/2018) 15 g 0 Allergies Allergen Reactions  Statins-Hmg-Coa Reductase Inhibitors Myalgia  Wheat Hives Past Medical History:  
Diagnosis Date  Acute pain of left knee 8/21/2017  Acute pain of left knee 8/21/2017  Diabetes mellitus type II, uncontrolled ([facility] [address] 8/8/2014  Diabetic polyneuropathy associated with type 2 diabetes mellitus ([facility] [address] 11/22/2016  DM (diabetes mellitus) ([facility] [address] 6/15/2010  GERD (gastroesophageal reflux disease) 2/21/2014  Hypercholesterolemia 6/15/2010  Hypertension  Prostate infarction 3/28/2016  Screening for depression 3/30/2014  Tinea cruris 3/13/2014  Urinary frequency 3/28/2016 No past surgical history on file. Family History Problem Relation Age of Onset  Lung Disease Mother  Hypertension Father  Heart Disease Father Social History Tobacco Use  Smoking status: Never Smoker  Smokeless tobacco: Never Used Substance Use Topics  Alcohol use: No  
  
Lab Results Component Value Date/Time  WBC 10.0 06/18/2018 11:35 AM  
 HGB 15.3 06/18/2018 11:35 AM  
 HCT 46.6 06/18/2018 11:35 AM  
 PLATELET 014 84/42/7042 11:35 AM  
 MCV 82 06/18/2018 11:35 AM  
 
Lab Results Component Value Date/Time TSH 3.070 04/11/2017 04:22 PM  
   
Review of Systems Constitutional: Negative for chills and fever. HENT: Negative for ear pain and nosebleeds. Eyes: Negative for blurred vision, pain and discharge. Respiratory: Negative for shortness of breath. Cardiovascular: Negative for chest pain and leg swelling. Gastrointestinal: Negative for constipation, diarrhea, nausea and vomiting. Genitourinary: Negative for frequency. Musculoskeletal: Negative for joint pain. Skin: Negative for itching and rash. Neurological: Negative for headaches. Psychiatric/Behavioral: Negative for depression. The patient is not nervous/anxious. Physical Exam  
Constitutional: He is oriented to person, place, and time. He appears well-developed and well-nourished. HENT:  
Head: Normocephalic and atraumatic. Mouth/Throat: No oropharyngeal exudate. Eyes: Conjunctivae and EOM are normal.  
Neck: Normal range of motion. Neck supple. Cardiovascular: Normal rate, regular rhythm and normal heart sounds. No murmur heard. Pulmonary/Chest: Effort normal and breath sounds normal. No respiratory distress. Abdominal: Soft. Bowel sounds are normal. He exhibits no distension. There is no rebound. Musculoskeletal: He exhibits no edema or tenderness. Neurological: He is alert and oriented to person, place, and time. Skin: Skin is warm. No erythema. Psychiatric: He has a normal mood and affect. His behavior is normal.  
Nursing note and vitals reviewed. ASSESSMENT and PLAN Diagnoses and all orders for this visit: 
 
1. Uncontrolled type 2 diabetes mellitus without complication, without long-term current use of insulin ([facility]) 
-     URIC ACID 
-     AMB POC HEMOGLOBIN A1C 
-     REFERRAL TO PODIATRY 
-     REFERRAL TO OPTOMETRY -     empagliflozin-linagliptin (GLYXAMBI) 25-5 mg tab; Take 1 Tab by mouth daily. 
-     glipiZIDE (GLUCOTROL) 10 mg tablet; TAKE ONE & ONE-HALF TABLETS BY MOUTH TWICE DAILY 2. Encounter for immunization 
-     varicella-zoster recombinant, PF, (SHINGRIX) 50 mcg/0.5 mL susr injection; 0.5 mL by IntraMUSCular route once for 1 dose. 3. Vitamin D deficiency 
-     ergocalciferol (ERGOCALCIFEROL) 50,000 unit capsule; TAKE 1 CAPSULE BY MOUTH EVERY 7 DAYS 
-     metFORMIN (GLUCOPHAGE) 1,000 mg tablet; TAKE ONE TABLET BY MOUTH TWICE DAILY WITH MEALS 4. TSH (thyroid-stimulating hormone deficiency) 
-     ergocalciferol (ERGOCALCIFEROL) 50,000 unit capsule; TAKE 1 CAPSULE BY MOUTH EVERY 7 DAYS 
-     metFORMIN (GLUCOPHAGE) 1,000 mg tablet; TAKE ONE TABLET BY MOUTH TWICE DAILY WITH MEALS 5. Mixed hyperlipidemia 
-     ergocalciferol (ERGOCALCIFEROL) 50,000 unit capsule; TAKE 1 CAPSULE BY MOUTH EVERY 7 DAYS 
-     metFORMIN (GLUCOPHAGE) 1,000 mg tablet; TAKE ONE TABLET BY MOUTH TWICE DAILY WITH MEALS 6. Essential hypertension 
-     ergocalciferol (ERGOCALCIFEROL) 50,000 unit capsule; TAKE 1 CAPSULE BY MOUTH EVERY 7 DAYS 
-     metFORMIN (GLUCOPHAGE) 1,000 mg tablet; TAKE ONE TABLET BY MOUTH TWICE DAILY WITH MEALS 7. Bilateral edema of lower extremity -     potassium chloride (K-DUR, KLOR-CON) 20 mEq tablet; Take 1 Tab by mouth three (3) times daily. 8. Mild intermittent asthma with acute exacerbation 
-     albuterol (PROAIR HFA) 90 mcg/actuation inhaler; Take 1 Puff by inhalation every six (6) hours as needed for Wheezing or Shortness of Breath. 9. Sleep apnea, unspecified type -     SLEEP MEDICINE REFERRAL 10. BMI 50.0-59.9, adult ([address] Other orders 
-     orlistat (XENICAL) 120 mg capsule; Take 120 mg by mouth three (3) times daily (with meals).  
 
Patient was told to work on the weight for better outcome specifically if he is not willing to take any insulin therapy for better diabetic care patient has been offered multiple time for better diabetic medication or being referred to endocrinologist so far patient has been declining any further care and any further change of medication stating that he has a new resolution regarding his weight and his diet and he likes to return in 3 months for repeat A1c

## 2019-01-12 RX ORDER — CHLORTHALIDONE 25 MG/1
25 TABLET ORAL DAILY
Qty: 30 TAB | Refills: 6 | Status: SHIPPED | OUTPATIENT
Start: 2019-01-12 | End: 2019-11-06 | Stop reason: SDUPTHER

## 2019-01-12 RX ORDER — AMLODIPINE BESYLATE AND BENAZEPRIL HYDROCHLORIDE 10; 40 MG/1; MG/1
1 CAPSULE ORAL DAILY
Qty: 30 CAP | Refills: 6 | Status: SHIPPED | OUTPATIENT
Start: 2019-01-12 | End: 2019-11-06 | Stop reason: SDUPTHER

## 2019-02-14 RX ORDER — METOLAZONE 5 MG/1
TABLET ORAL
Qty: 90 TAB | Refills: 3 | Status: SHIPPED | OUTPATIENT
Start: 2019-02-14 | End: 2020-03-16

## 2019-04-08 ENCOUNTER — OFFICE VISIT (OUTPATIENT)
Dept: FAMILY MEDICINE CLINIC | Age: 49
End: 2019-04-08

## 2019-04-08 VITALS
DIASTOLIC BLOOD PRESSURE: 87 MMHG | BODY MASS INDEX: 42.66 KG/M2 | SYSTOLIC BLOOD PRESSURE: 148 MMHG | OXYGEN SATURATION: 97 % | WEIGHT: 315 LBS | HEIGHT: 72 IN | RESPIRATION RATE: 20 BRPM | HEART RATE: 58 BPM | TEMPERATURE: 98.1 F

## 2019-04-08 DIAGNOSIS — I10 ESSENTIAL HYPERTENSION: ICD-10-CM

## 2019-04-08 DIAGNOSIS — R60.0 BILATERAL EDEMA OF LOWER EXTREMITY: ICD-10-CM

## 2019-04-08 DIAGNOSIS — R60.0 EDEMA LEG: ICD-10-CM

## 2019-04-08 DIAGNOSIS — E78.2 MIXED HYPERLIPIDEMIA: ICD-10-CM

## 2019-04-08 DIAGNOSIS — E11.9 DIABETES MELLITUS WITHOUT COMPLICATION (HCC): ICD-10-CM

## 2019-04-08 DIAGNOSIS — Z23 ENCOUNTER FOR IMMUNIZATION: Primary | ICD-10-CM

## 2019-04-08 DIAGNOSIS — E79.0 ABNORMAL BLOOD LEVEL OF URIC ACID: ICD-10-CM

## 2019-04-08 LAB — HBA1C MFR BLD HPLC: 8.1 %

## 2019-04-08 NOTE — PATIENT INSTRUCTIONS
Body Mass Index: Care Instructions Your Care Instructions Body mass index (BMI) can help you see if your weight is raising your risk for health problems. It uses a formula to compare how much you weigh with how tall you are. · A BMI lower than 18.5 is considered underweight. · A BMI between 18.5 and 24.9 is considered healthy. · A BMI between 25 and 29.9 is considered overweight. A BMI of 30 or higher is considered obese. If your BMI is in the normal range, it means that you have a lower risk for weight-related health problems. If your BMI is in the overweight or obese range, you may be at increased risk for weight-related health problems, such as high blood pressure, heart disease, stroke, arthritis or joint pain, and diabetes. If your BMI is in the underweight range, you may be at increased risk for health problems such as fatigue, lower protection (immunity) against illness, muscle loss, bone loss, hair loss, and hormone problems. BMI is just one measure of your risk for weight-related health problems. You may be at higher risk for health problems if you are not active, you eat an unhealthy diet, or you drink too much alcohol or use tobacco products. Follow-up care is a key part of your treatment and safety. Be sure to make and go to all appointments, and call your doctor if you are having problems. It's also a good idea to know your test results and keep a list of the medicines you take. How can you care for yourself at home? · Practice healthy eating habits. This includes eating plenty of fruits, vegetables, whole grains, lean protein, and low-fat dairy. · If your doctor recommends it, get more exercise. Walking is a good choice. Bit by bit, increase the amount you walk every day. Try for at least 30 minutes on most days of the week. · Do not smoke. Smoking can increase your risk for health problems.  If you need help quitting, talk to your doctor about stop-smoking programs and medicines. These can increase your chances of quitting for good. · Limit alcohol to 2 drinks a day for men and 1 drink a day for women. Too much alcohol can cause health problems. If you have a BMI higher than 25 · Your doctor may do other tests to check your risk for weight-related health problems. This may include measuring the distance around your waist. A waist measurement of more than 40 inches in men or 35 inches in women can increase the risk of weight-related health problems. · Talk with your doctor about steps you can take to stay healthy or improve your health. You may need to make lifestyle changes to lose weight and stay healthy, such as changing your diet and getting regular exercise. If you have a BMI lower than 18.5 · Your doctor may do other tests to check your risk for health problems. · Talk with your doctor about steps you can take to stay healthy or improve your health. You may need to make lifestyle changes to gain or maintain weight and stay healthy, such as getting more healthy foods in your diet and doing exercises to build muscle. Where can you learn more? Go to http://alma-isabelle.info/. Enter S176 in the search box to learn more about \"Body Mass Index: Care Instructions. \" Current as of: October 13, 2016 Content Version: 11.4 © 2843-6016 [employer]. Care instructions adapted under license by [employer] (which disclaims liability or warranty for this information). If you have questions about a medical condition or this instruction, always ask your healthcare professional. [address] any warranty or liability for your use of this information.

## 2019-04-08 NOTE — PROGRESS NOTES
HISTORY OF PRESENT ILLNESS Benedicto Hidalgo is a 46 y.o. male. HPI Patient with history of gout last uric acid was elevated and Uloric, unfortunately is not compliant with his gout medication he had an attack which responded nicely to ibuprofen as per the patient, DMtype II Patient present for diabetic check,the patient has been compliancy w/ meds, patient also states that he is trying to have a diabetic diet, and eat less of carbohydrates, since last visit patient has been more active with daily walking, patient also states that there is a home monitoring glucose device and sometimes he does sometimes he does not but usually averaging around 130 , +++ Rf needed for today. This time patient denies  tingling sensation, has no polyurea and polydipsia, last a1c was not at target was almost 6 weeks ago as well as 8.4% today's at 8 percentile improved %     Last urine microalbumin 2019 and was abnormal, patient currently on ACE inhibitor. Feeling better since the last visit. Vitals 4/8/2019 1/7/2019 Weight 383 lb 12.8 oz 394 lb 3.2 oz Current Outpatient Medications Medication Sig Dispense Refill  varicella-zoster recombinant, PF, (SHINGRIX) 50 mcg/0.5 mL susr injection 0.5 mL by IntraMUSCular route once for 1 dose. 0.5 mL 0  
 metOLazone (ZAROXOLYN) 5 mg tablet TAKE ONE TABLET BY MOUTH ONCE DAILY 90 Tab 3  
 amLODIPine-benazepril (LOTREL) 10-40 mg per capsule Take 1 Cap by mouth daily. 30 Cap 6  chlorthalidone (HYGROTEN) 25 mg tablet Take 1 Tab by mouth daily. 30 Tab 6  ergocalciferol (ERGOCALCIFEROL) 50,000 unit capsule TAKE 1 CAPSULE BY MOUTH EVERY 7 DAYS 12 Cap 6  potassium chloride (K-DUR, KLOR-CON) 20 mEq tablet Take 1 Tab by mouth three (3) times daily. 270 Tab 2  
 albuterol (PROAIR HFA) 90 mcg/actuation inhaler Take 1 Puff by inhalation every six (6) hours as needed for Wheezing or Shortness of Breath.  1 Inhaler 11  
  empagliflozin-linagliptin (GLYXAMBI) 25-5 mg tab Take 1 Tab by mouth daily. 90 Tab 6  
 glipiZIDE (GLUCOTROL) 10 mg tablet TAKE ONE & ONE-HALF TABLETS BY MOUTH TWICE DAILY 270 Tab 4  
 metFORMIN (GLUCOPHAGE) 1,000 mg tablet TAKE ONE TABLET BY MOUTH TWICE DAILY WITH MEALS 180 Tab 4  
 ketoconazole (NIZORAL) 2 % topical cream APPLY TOPICALLY TO AFFECTED AREA 2 TIMES DAILY 60 oz 6  
 aspirin 81 mg tablet Take 1 Tab by mouth daily. 30 Tab 11  
 glucose blood VI test strips (ASCENSIA AUTODISC VI, ONE TOUCH ULTRA TEST VI) strip by Does Not Apply route. As directed 1 Package 11  Lancets Misc by Does Not Apply route. As directed 1 Package 11  
 orlistat (XENICAL) 120 mg capsule Take 120 mg by mouth three (3) times daily (with meals). 90 Cap 6  
 diclofenac (VOLTAREN) 1 % gel Apply 2 g to affected area four (4) times daily. (Patient taking differently: Apply 2 g to affected area as needed.) 100 g 2  
 betamethasone dipropionate (DIPROSONE) 0.05 % topical cream Apply  to affected area two (2) times a day. (Patient not taking: Reported on 6/18/2018) 15 g 0 Allergies Allergen Reactions  Statins-Hmg-Coa Reductase Inhibitors Myalgia  Wheat Hives Past Medical History:  
Diagnosis Date  Acute pain of left knee 8/21/2017  Acute pain of left knee 8/21/2017  Diabetes mellitus type II, uncontrolled ([address] 8/8/2014  Diabetic polyneuropathy associated with type 2 diabetes mellitus ([address] 11/22/2016  DM (diabetes mellitus) ([address] 6/15/2010  GERD (gastroesophageal reflux disease) 2/21/2014  Hypercholesterolemia 6/15/2010  Hypertension  Prostate infarction 3/28/2016  Screening for depression 3/30/2014  Tinea cruris 3/13/2014  Urinary frequency 3/28/2016 History reviewed. No pertinent surgical history. Family History Problem Relation Age of Onset  Lung Disease Mother  Hypertension Father  Heart Disease Father Social History Tobacco Use  
  Smoking status: Never Smoker  Smokeless tobacco: Never Used Substance Use Topics  Alcohol use: No  
  
Lab Results Component Value Date/Time WBC 10.0 06/18/2018 11:35 AM  
 HGB 15.3 06/18/2018 11:35 AM  
 HCT 46.6 06/18/2018 11:35 AM  
 PLATELET 338 25/07/8845 11:35 AM  
 MCV 82 06/18/2018 11:35 AM  
 
Lab Results Component Value Date/Time TSH 3.070 04/11/2017 04:22 PM  
   
Review of Systems Constitutional: Negative for chills and fever. HENT: Negative for ear pain and nosebleeds. Eyes: Negative for blurred vision, pain and discharge. Respiratory: Negative for shortness of breath. Cardiovascular: Positive for leg swelling. Negative for chest pain. Gastrointestinal: Negative for constipation, diarrhea, nausea and vomiting. Genitourinary: Negative for frequency. Musculoskeletal: Negative for joint pain. Skin: Negative for itching and rash. Neurological: Negative for headaches. Psychiatric/Behavioral: Negative for depression. The patient is not nervous/anxious. Physical Exam  
Constitutional: He is oriented to person, place, and time. He appears well-developed and well-nourished. HENT:  
Head: Normocephalic and atraumatic. Mouth/Throat: No oropharyngeal exudate. Eyes: Conjunctivae and EOM are normal.  
Neck: Normal range of motion. Neck supple. Cardiovascular: Normal rate, regular rhythm and normal heart sounds. No murmur heard. Pulmonary/Chest: Effort normal and breath sounds normal. No respiratory distress. Abdominal: Soft. Bowel sounds are normal. He exhibits no distension. There is no rebound. Musculoskeletal: He exhibits edema. He exhibits no tenderness. Neurological: He is alert and oriented to person, place, and time. Skin: Skin is warm. No erythema. Psychiatric: He has a normal mood and affect. His behavior is normal.  
Nursing note and vitals reviewed. ASSESSMENT and PLAN Diagnoses and all orders for this visit: 
 
 1. Encounter for immunization 
-     varicella-zoster recombinant, PF, (SHINGRIX) 50 mcg/0.5 mL susr injection; 0.5 mL by IntraMUSCular route once for 1 dose. 2. Diabetes mellitus without complication (HCC) 
-     REFERRAL TO OPTOMETRY 
-     CBC W/O DIFF 
-     AMB POC HEMOGLOBIN A1C 
-     HDL CHOLESTEROL 
-     CHOLESTEROL, TOTAL 
-     METABOLIC PANEL, COMPREHENSIVE 
-     URIC ACID 
-     VITAMIN B12 & FOLATE 
-     PSA, DIAGNOSTIC (PROSTATE SPECIFIC AG) 3. Uncontrolled type 2 diabetes mellitus without complication, without long-term current use of insulin (HCC) 
-      DIABETES FOOT EXAM 
 
4. Essential hypertension 5. Edema leg 6. Bilateral edema of lower extremity 7. Mixed hyperlipidemia 8. Abnormal blood level of uric acid 9. BMI 50.0-59.9, adult ([address] Leg elevation at all times or most of the times, support hoses b/l 24hrs per day, ambulation as possible, use of two pillows at nights while in bed At this time patient advised to do diabetic diet currently eats at 4 AM does not eat till 2 PM and again eat at 7 PM patient was told to have diabetic diet and heat every 2-3 hours patient is not willing to change any medication at this time we will try to give the diet a chance otherwise will reconsider in 3 months Discussed the patient's BMI with him. The BMI follow up plan is as follows:  
 
dietary management education, guidance, and counseling 
encourage exercise 
monitor weight 
prescribed dietary intake An After Visit Summary was printed and given to the patient.

## 2019-04-08 NOTE — PROGRESS NOTES
Name and  verified Chief Complaint Patient presents with  Diabetes  Hypertension Health Maintenance reviewed-discussed with patient. 1. Have you been to the ER, urgent care clinic since your last visit? Hospitalized since your last visit? no 
 
2. Have you seen or consulted any other health care providers outside of the [address] [place] [address] since your last visit? Include any pap smears or colon screening.  no

## 2019-04-09 LAB
ALBUMIN SERPL-MCNC: 3.8 G/DL (ref 3.5–5.5)
ALBUMIN/GLOB SERPL: 1 {RATIO} (ref 1.2–2.2)
ALP SERPL-CCNC: 61 IU/L (ref 39–117)
ALT SERPL-CCNC: 15 IU/L (ref 0–44)
AST SERPL-CCNC: 17 IU/L (ref 0–40)
BILIRUB SERPL-MCNC: 0.6 MG/DL (ref 0–1.2)
BUN SERPL-MCNC: 20 MG/DL (ref 6–24)
BUN/CREAT SERPL: 14 (ref 9–20)
CALCIUM SERPL-MCNC: 9.2 MG/DL (ref 8.7–10.2)
CHLORIDE SERPL-SCNC: 97 MMOL/L (ref 96–106)
CHOLEST SERPL-MCNC: 156 MG/DL (ref 100–199)
CO2 SERPL-SCNC: 25 MMOL/L (ref 20–29)
CREAT SERPL-MCNC: 1.43 MG/DL (ref 0.76–1.27)
ERYTHROCYTE [DISTWIDTH] IN BLOOD BY AUTOMATED COUNT: 16.2 % (ref 12.3–15.4)
FOLATE SERPL-MCNC: 12 NG/ML
GLOBULIN SER CALC-MCNC: 3.9 G/DL (ref 1.5–4.5)
GLUCOSE SERPL-MCNC: 89 MG/DL (ref 65–99)
HCT VFR BLD AUTO: 50.3 % (ref 37.5–51)
HDLC SERPL-MCNC: 46 MG/DL
HGB BLD-MCNC: 16.4 G/DL (ref 13–17.7)
INTERPRETATION: NORMAL
Lab: NORMAL
MCH RBC QN AUTO: 27.3 PG (ref 26.6–33)
MCHC RBC AUTO-ENTMCNC: 32.6 G/DL (ref 31.5–35.7)
MCV RBC AUTO: 84 FL (ref 79–97)
PLATELET # BLD AUTO: 224 X10E3/UL (ref 150–379)
POTASSIUM SERPL-SCNC: 4 MMOL/L (ref 3.5–5.2)
PROT SERPL-MCNC: 7.7 G/DL (ref 6–8.5)
PSA SERPL-MCNC: 0.3 NG/ML (ref 0–4)
RBC # BLD AUTO: 6 X10E6/UL (ref 4.14–5.8)
SODIUM SERPL-SCNC: 140 MMOL/L (ref 134–144)
URATE SERPL-MCNC: 9.6 MG/DL (ref 3.7–8.6)
VIT B12 SERPL-MCNC: 524 PG/ML (ref 232–1245)
WBC # BLD AUTO: 9.4 X10E3/UL (ref 3.4–10.8)

## 2019-07-08 ENCOUNTER — OFFICE VISIT (OUTPATIENT)
Dept: FAMILY MEDICINE CLINIC | Age: 49
End: 2019-07-08

## 2019-07-08 VITALS
OXYGEN SATURATION: 96 % | SYSTOLIC BLOOD PRESSURE: 132 MMHG | HEIGHT: 72 IN | BODY MASS INDEX: 42.66 KG/M2 | WEIGHT: 315 LBS | RESPIRATION RATE: 20 BRPM | HEART RATE: 64 BPM | TEMPERATURE: 98.1 F | DIASTOLIC BLOOD PRESSURE: 88 MMHG

## 2019-07-08 DIAGNOSIS — E11.42 DIABETIC POLYNEUROPATHY ASSOCIATED WITH TYPE 2 DIABETES MELLITUS (HCC): Primary | ICD-10-CM

## 2019-07-08 DIAGNOSIS — I10 ESSENTIAL HYPERTENSION: ICD-10-CM

## 2019-07-08 DIAGNOSIS — M25.531 WRIST PAIN, ACUTE, RIGHT: ICD-10-CM

## 2019-07-08 DIAGNOSIS — M10.9 GOUT, UNSPECIFIED CAUSE, UNSPECIFIED CHRONICITY, UNSPECIFIED SITE: ICD-10-CM

## 2019-07-08 DIAGNOSIS — Z23 ENCOUNTER FOR IMMUNIZATION: ICD-10-CM

## 2019-07-08 LAB — HBA1C MFR BLD HPLC: 7.8 %

## 2019-07-08 RX ORDER — GABAPENTIN 100 MG/1
100 CAPSULE ORAL 3 TIMES DAILY
Qty: 60 CAP | Refills: 0 | Status: SHIPPED | OUTPATIENT
Start: 2019-07-08 | End: 2022-02-21 | Stop reason: SDUPTHER

## 2019-07-08 NOTE — PATIENT INSTRUCTIONS
Learning About Diabetes Food Guidelines Your Care Instructions Meal planning is important to manage diabetes. It helps keep your blood sugar at a target level (which you set with your doctor). You don't have to eat special foods. You can eat what your family eats, including sweets once in a while. But you do have to pay attention to how often you eat and how much you eat of certain foods. You may want to work with a dietitian or a certified diabetes educator (CDE) to help you plan meals and snacks. A dietitian or CDE can also help you lose weight if that is one of your goals. What should you know about eating carbs? Managing the amount of carbohydrate (carbs) you eat is an important part of healthy meals when you have diabetes. Carbohydrate is found in many foods. · Learn which foods have carbs. And learn the amounts of carbs in different foods. ? Bread, cereal, pasta, and rice have about 15 grams of carbs in a serving. A serving is 1 slice of bread (1 ounce), ½ cup of cooked cereal, or 1/3 cup of cooked pasta or rice. ? Fruits have 15 grams of carbs in a serving. A serving is 1 small fresh fruit, such as an apple or orange; ½ of a banana; ½ cup of cooked or canned fruit; ½ cup of fruit juice; 1 cup of melon or raspberries; or 2 tablespoons of dried fruit. ? Milk and no-sugar-added yogurt have 15 grams of carbs in a serving. A serving is 1 cup of milk or 2/3 cup of no-sugar-added yogurt. ? Starchy vegetables have 15 grams of carbs in a serving. A serving is ½ cup of mashed potatoes or sweet potato; 1 cup winter squash; ½ of a small baked potato; ½ cup of cooked beans; or ½ cup cooked corn or green peas. · Learn how much carbs to eat each day and at each meal. A dietitian or CDE can teach you how to keep track of the amount of carbs you eat. This is called carbohydrate counting.  
· If you are not sure how to count carbohydrate grams, use the Plate Method to plan meals. It is a good, quick way to make sure that you have a balanced meal. It also helps you spread carbs throughout the day. ? Divide your plate by types of foods. Put non-starchy vegetables on half the plate, meat or other protein food on one-quarter of the plate, and a grain or starchy vegetable in the final quarter of the plate. To this you can add a small piece of fruit and 1 cup of milk or yogurt, depending on how many carbs you are supposed to eat at a meal. 
· Try to eat about the same amount of carbs at each meal. Do not \"save up\" your daily allowance of carbs to eat at one meal. 
· Proteins have very little or no carbs per serving. Examples of proteins are beef, chicken, turkey, fish, eggs, tofu, cheese, cottage cheese, and peanut butter. A serving size of meat is 3 ounces, which is about the size of a deck of cards. Examples of meat substitute serving sizes (equal to 1 ounce of meat) are 1/4 cup of cottage cheese, 1 egg, 1 tablespoon of peanut butter, and ½ cup of tofu. How can you eat out and still eat healthy? · Learn to estimate the serving sizes of foods that have carbohydrate. If you measure food at home, it will be easier to estimate the amount in a serving of restaurant food. · If the meal you order has too much carbohydrate (such as potatoes, corn, or baked beans), ask to have a low-carbohydrate food instead. Ask for a salad or green vegetables. · If you use insulin, check your blood sugar before and after eating out to help you plan how much to eat in the future. · If you eat more carbohydrate at a meal than you had planned, take a walk or do other exercise. This will help lower your blood sugar. What else should you know? · Limit saturated fat, such as the fat from meat and dairy products. This is a healthy choice because people who have diabetes are at higher risk of heart disease.  So choose lean cuts of meat and nonfat or low-fat dairy products. Use olive or canola oil instead of butter or shortening when cooking. · Don't skip meals. Your blood sugar may drop too low if you skip meals and take insulin or certain medicines for diabetes. · Check with your doctor before you drink alcohol. Alcohol can cause your blood sugar to drop too low. Alcohol can also cause a bad reaction if you take certain diabetes medicines. Follow-up care is a key part of your treatment and safety. Be sure to make and go to all appointments, and call your doctor if you are having problems. It's also a good idea to know your test results and keep a list of the medicines you take. Where can you learn more? Go to http://alma-isabelle.info/. Enter C464 in the search box to learn more about \"Learning About Diabetes Food Guidelines. \" Current as of: July 25, 2018 Content Version: 11.9 © 4218-3233 [employer]. Care instructions adapted under license by NATALIA Rajput 114 Carpal Tunnel Syndrome: Exercises Your Care Instructions Here are some examples of typical rehabilitation exercises for your condition. Start each exercise slowly. Ease off the exercise if you start to have pain. Your doctor or your physical or occupational therapist will tell you when you can start these exercises and which ones will work best for you. Warm-up stretches When you no longer have pain or numbness, you can do exercises to help prevent carpal tunnel syndrome from coming back. Do not do any stretch or movement that is uncomfortable or painful. 1. Rotate your wrist up, down, and from side to side. Repeat 4 times. 2. Stretch your fingers far apart. Relax them, and then stretch them again. Repeat 4 times. 3. Stretch your thumb by pulling it back gently, holding it, and then releasing it. Repeat 4 times. How to do the exercises Prayer stretch 1. Start with your palms together in front of your chest just below your chin. 2. Slowly lower your hands toward your waistline, keeping your hands close to your stomach and your palms together until you feel a mild to moderate stretch under your forearms. 3. Hold for at least 15 to 30 seconds. Repeat 2 to 4 times. Wrist flexor stretch 1. Extend your arm in front of you with your palm up. 2. Bend your wrist, pointing your hand toward the floor. 3. With your other hand, gently bend your wrist farther until you feel a mild to moderate stretch in your forearm. 4. Hold for at least 15 to 30 seconds. Repeat 2 to 4 times. Wrist extensor stretch 1. Repeat steps 1 through 4 of the stretch above, but begin with your extended hand palm down. Follow-up care is a key part of your treatment and safety. Be sure to make and go to all appointments, and call your doctor if you are having problems. It's also a good idea to know your test results and keep a list of the medicines you take. Where can you learn more? Go to http://alma-isabelle.info/. Enter T395 in the search box to learn more about \"Carpal Tunnel Syndrome: Exercises. \" Current as of: September 20, 2018 Content Version: 11.9 © 8977-4279 [employer]. Care instructions adapted under license by [employer] (which disclaims liability or warranty for this information). If you have questions about a medical condition or this instruction, always ask your healthcare professional. [address] any warranty or liability for your use of this information. [employer]ctions (which disclaims liability or warranty for this information). If you have questions about a medical condition or this instruction, always ask your healthcare professional. [address] any warranty or liability for your use of this information. Gout: Care Instructions Your Care Instructions Gout is a form of arthritis caused by a buildup of uric acid crystals in a joint. It causes sudden attacks of pain, swelling, redness, and stiffness, usually in one joint, especially the big toe. Gout usually comes on without a cause. But it can be brought on by drinking alcohol (especially beer) or eating seafood and red meat. Taking certain medicines, such as diuretics or aspirin, also can bring on an attack of gout. Taking your medicines as prescribed and following up with your doctor regularly can help you avoid gout attacks in the future. Follow-up care is a key part of your treatment and safety. Be sure to make and go to all appointments, and call your doctor if you are having problems. It's also a good idea to know your test results and keep a list of the medicines you take. How can you care for yourself at home? · If the joint is swollen, put ice or a cold pack on the area for 10 to 20 minutes at a time. Put a thin cloth between the ice and your skin. · Prop up the sore limb on a pillow when you ice it or anytime you sit or lie down during the next 3 days. Try to keep it above the level of your heart. This will help reduce swelling. · Rest sore joints. Avoid activities that put weight or strain on the joints for a few days. Take short rest breaks from your regular activities during the day. · Take your medicines exactly as prescribed. Call your doctor if you think you are having a problem with your medicine. · Take pain medicines exactly as directed. If the doctor gave you a prescription medicine for pain, take it as prescribed Purine-Restricted Diet: Care Instructions Your Care Instructions Purines are substances that are found in some foods. Your body turns purines into uric acid. High levels of uric acid can cause gout, which is a form of arthritis that causes pain and inflammation in joints. You may be able to help control the amount of uric acid in your body by limiting high-purine foods in your diet. Follow-up care is a key part of your treatment and safety. Be sure to make and go to all appointments, and call your doctor if you are having problems. It's also a good idea to know your test results and keep a list of the medicines you take. How can you care for yourself at home? · Plan your meals and snacks around foods that are low in purines and are safe for you to eat. These foods include: ? Green vegetables and tomatoes. ? Fruits. ? Whole-grain breads, rice, and cereals. ? Eggs, peanut butter, and nuts. ? Low-fat milk, cheese, and other milk products. ? Popcorn. ? Gelatin desserts, chocolate, cocoa, and cakes and sweets, in small amounts. · You can eat certain foods that are medium-high in purines, but eat them only once in a while. These foods include: 
? Legumes, such as dried beans and dried peas. You can have 1 cup cooked legumes each day. ? Asparagus, cauliflower, spinach, mushrooms, and green peas. ? Fish and seafood (other than very high-purine seafood). ? Oatmeal, wheat bran, and wheat germ. · Limit very high-purine foods, including: 
? Organ meats, such as liver, kidneys, sweetbreads, and brains. ? Meats, including ramirez, beef, pork, and lamb. ? Game meats and any other meats in large amounts. ? Anchovies, sardines, herring, mackerel, and scallops. ? Gravy. ? Beer. Where can you learn more? Go to http://alma-isabelle.info/. Enter F448 in the search box to learn more about \"Purine-Restricted Diet: Care Instructions. \" Current as of: March 28, 2018 Content Version: 11.9 © 2091-6783 [employer], [employer]. Care instructions adapted under license by [employer] (which disclaims liability or warranty for this information). If you have questions about a medical condition or this instruction, always ask your healthcare professional. [address] any warranty or liability for your use of this information. ? . ? If you are not taking a prescription pain medicine, ask your doctor if you can take an over-the-counter medicine. · Eat less seafood and red meat. · Check with your doctor before drinking alcohol. · Losing weight, if you are overweight, may help reduce attacks of gout. But do not go on a [employer]. \" Losing a lot of weight in a short amount of time can cause a gout attack. When should you call for help? Call your doctor now or seek immediate medical care if: 
  · You have a fever.  
  · The joint is so painful you cannot use it.  
  · You have sudden, unexplained swelling, redness, warmth, or severe pain in one or more joints.  
 Watch closely for changes in your health, and be sure to contact your doctor if: 
  · You have joint pain.  
  · Your symptoms get worse or are not improving after 2 or 3 days. Where can you learn more? Go to http://alma-isabelle.info/. Enter V861 in the search box to learn more about \"Gout: Care Instructions. \" Current as of: Shavonne 10, 2018 Content Version: 11.9 © 1843-5962 [employer]. Care instructions adapted under license by [employer] (which disclaims liability or warranty for this information). If you have questions about a medical condition or this instruction, always ask your healthcare professional. [address] any warranty or liability for your use of this information.

## 2019-07-08 NOTE — PROGRESS NOTES
Name and  verified      Chief Complaint   Patient presents with    Diabetes    Wrist Pain     right for 2-3 months    Rash     left arm for 6 months with itching         Health Maintenance reviewed-discussed with patient. 1. Have you been to the ER, urgent care clinic since your last visit? Hospitalized since your last visit? no    2. Have you seen or consulted any other health care providers outside of the [address] [place] [address] since your last visit? Include any pap smears or colon screening.  no

## 2019-07-08 NOTE — PROGRESS NOTES
HISTORY OF PRESENT ILLNESS  Jose Munoz is a 46 y.o. male. HPI   DMtype II  Patient also present for diabetic check,  the patient has been compliancy w/ meds, patient also states that the pt is trying to have a diabetic diet, and eat less of carbohydrates, since last visit patient has been more active with daily walking, patient also states that there is a home monitoring glucose device  usually averaging around 160 , +++ Rf needed for today. This time patient denies  tingling sensation, has no polyurea and polydipsia, last a1c was not at target of 8.1 to 7.8% %     Last urine microalbumin 2019 and was abnormal, patient currently on ACE inhibitor. Diabetes    Wrist Pain (right for 2-3 months)   Rash (left arm for 6 months with itching)     Rash of the left arm rt buttock  Started few weeks ago not better tried alcohol washing and OTC antibiotic ointments, dosenot tingles and not pain full, states that is notexpanding red, and not  swelled up, whitish patches rounds, with itchiness    Rt wrst pain + trauma at home, patient with history of gout does not want to take any gout medication trying to take less medication as he states and trying to get it done with herbs currently the pain is 4 out of 10 uric acid last visit was elevated at this time patient is stable    Feeling better since the last visit. Current Outpatient Medications   Medication Sig Dispense Refill    varicella-zoster recombinant, PF, (SHINGRIX) 50 mcg/0.5 mL susr injection 0.5 mL by IntraMUSCular route once for 1 dose. 0.5 mL 0    gabapentin (NEURONTIN) 100 mg capsule Take 1 Cap by mouth three (3) times daily. Max Daily Amount: 300 mg. 60 Cap 0    metOLazone (ZAROXOLYN) 5 mg tablet TAKE ONE TABLET BY MOUTH ONCE DAILY 90 Tab 3    amLODIPine-benazepril (LOTREL) 10-40 mg per capsule Take 1 Cap by mouth daily. 30 Cap 6    chlorthalidone (HYGROTEN) 25 mg tablet Take 1 Tab by mouth daily.  30 Tab 6    ergocalciferol (ERGOCALCIFEROL) 50,000 unit capsule TAKE 1 CAPSULE BY MOUTH EVERY 7 DAYS 12 Cap 6    potassium chloride (K-DUR, KLOR-CON) 20 mEq tablet Take 1 Tab by mouth three (3) times daily. 270 Tab 2    albuterol (PROAIR HFA) 90 mcg/actuation inhaler Take 1 Puff by inhalation every six (6) hours as needed for Wheezing or Shortness of Breath. 1 Inhaler 11    empagliflozin-linagliptin (GLYXAMBI) 25-5 mg tab Take 1 Tab by mouth daily. 90 Tab 6    glipiZIDE (GLUCOTROL) 10 mg tablet TAKE ONE & ONE-HALF TABLETS BY MOUTH TWICE DAILY 270 Tab 4    metFORMIN (GLUCOPHAGE) 1,000 mg tablet TAKE ONE TABLET BY MOUTH TWICE DAILY WITH MEALS 180 Tab 4    ketoconazole (NIZORAL) 2 % topical cream APPLY TOPICALLY TO AFFECTED AREA 2 TIMES DAILY 60 oz 6    glucose blood VI test strips (ASCENSIA AUTODISC VI, ONE TOUCH ULTRA TEST VI) strip by Does Not Apply route. As directed 1 Package 11    Lancets Misc by Does Not Apply route. As directed   1 Package 11    orlistat (XENICAL) 120 mg capsule Take 120 mg by mouth three (3) times daily (with meals). 90 Cap 6    diclofenac (VOLTAREN) 1 % gel Apply 2 g to affected area four (4) times daily. (Patient taking differently: Apply 2 g to affected area as needed.) 100 g 2    betamethasone dipropionate (DIPROSONE) 0.05 % topical cream Apply  to affected area two (2) times a day. (Patient not taking: Reported on 6/18/2018) 15 g 0    aspirin 81 mg tablet Take 1 Tab by mouth daily.  30 Tab 11     Allergies   Allergen Reactions    Statins-Hmg-Coa Reductase Inhibitors Myalgia    Wheat Hives     Past Medical History:   Diagnosis Date    Acute pain of left knee 8/21/2017    Acute pain of left knee 8/21/2017    Diabetes mellitus type II, uncontrolled ([address] 8/8/2014    Diabetic polyneuropathy associated with type 2 diabetes mellitus ([address] 11/22/2016    DM (diabetes mellitus) ([facility] 6/15/2010    GERD (gastroesophageal reflux disease) 2/21/2014    Hypercholesterolemia 6/15/2010    Hypertension     Prostate infarction 3/28/2016    Screening for depression 3/30/2014    Tinea cruris 3/13/2014    Urinary frequency 3/28/2016     History reviewed. No pertinent surgical history. Family History   Problem Relation Age of Onset    Lung Disease Mother     Hypertension Father     Heart Disease Father      Social History     Tobacco Use    Smoking status: Never Smoker    Smokeless tobacco: Never Used   Substance Use Topics    Alcohol use: No      Lab Results   Component Value Date/Time    Hemoglobin A1c 9.1 (H) 06/18/2018 11:35 AM    Hemoglobin A1c 8.6 (H) 08/08/2014 12:18 PM    Hemoglobin A1c 8.6 (H) 02/21/2014 10:04 AM    Glucose 89 04/08/2019 11:30 AM    Glucose (POC) 292 (H) 06/15/2010 05:14 AM    Glucose POC 205mg/dl 06/15/2010 03:13 PM    Microalbumin/Creat ratio (mg/g creat) 117 (H) 06/15/2010 04:51 PM    Microalb/Creat ratio (ug/mg creat.) 123.2 (H) 06/18/2018 11:35 AM    Microalbumin,urine random 6.79 06/15/2010 04:51 PM    LDL, calculated 76 06/18/2018 11:35 AM    Creatinine 1.43 (H) 04/08/2019 11:30 AM      Lab Results   Component Value Date/Time    Cholesterol, total 156 04/08/2019 11:30 AM    HDL Cholesterol 46 04/08/2019 11:30 AM    LDL, calculated 76 06/18/2018 11:35 AM    Triglyceride 124 06/18/2018 11:35 AM    CHOL/HDL Ratio 4.0 08/17/2010 10:34 AM     Lab Results   Component Value Date/Time    ALT (SGPT) 15 04/08/2019 11:30 AM    AST (SGOT) 17 04/08/2019 11:30 AM    Alk. phosphatase 61 04/08/2019 11:30 AM    Bilirubin, total 0.6 04/08/2019 11:30 AM    Albumin 3.8 04/08/2019 11:30 AM    Protein, total 7.7 04/08/2019 11:30 AM    INR 1.2 (H) 06/12/2010 10:17 PM    Prothrombin time 12.7 (H) 06/12/2010 10:17 PM    PLATELET 107 40/62/7935 11:30 AM        Review of Systems   Constitutional: Negative for chills and fever. HENT: Negative for ear pain and nosebleeds. Eyes: Negative for blurred vision, pain and discharge. Respiratory: Negative for shortness of breath.     Cardiovascular: Negative for chest pain and leg swelling. Gastrointestinal: Negative for constipation, diarrhea, nausea and vomiting. Genitourinary: Negative for frequency. Musculoskeletal: Positive for joint pain and myalgias. Skin: Positive for itching and rash. Neurological: Negative for headaches. Psychiatric/Behavioral: Negative for depression. The patient is not nervous/anxious. Physical Exam   Constitutional: He is oriented to person, place, and time. He appears well-developed and well-nourished. HENT:   Head: Normocephalic and atraumatic. Mouth/Throat: No oropharyngeal exudate. Eyes: Conjunctivae and EOM are normal.   Neck: Normal range of motion. Neck supple. Cardiovascular: Normal rate, regular rhythm and normal heart sounds. No murmur heard. Pulmonary/Chest: Effort normal and breath sounds normal. No respiratory distress. Abdominal: Soft. Bowel sounds are normal. He exhibits no distension. There is no rebound. Musculoskeletal: He exhibits edema and tenderness. Neurological: He is alert and oriented to person, place, and time. Skin: Skin is warm. Rash noted. There is erythema. Psychiatric: He has a normal mood and affect. His behavior is normal.   Nursing note and vitals reviewed. ASSESSMENT and PLAN  Diagnoses and all orders for this visit:    1. Diabetic polyneuropathy associated with type 2 diabetes mellitus (HCC)  -     AMB POC HEMOGLOBIN A1C  -     gabapentin (NEURONTIN) 100 mg capsule; Take 1 Cap by mouth three (3) times daily. Max Daily Amount: 300 mg.  -     XR WRIST RT AP/LAT; Future    2. Essential hypertension  -     METABOLIC PANEL, COMPREHENSIVE    3. Gout, unspecified cause, unspecified chronicity, unspecified site  -     URIC ACID  -     gabapentin (NEURONTIN) 100 mg capsule; Take 1 Cap by mouth three (3) times daily. Max Daily Amount: 300 mg.  -     XR WRIST RT AP/LAT; Future    4.  Type II diabetes mellitus with complication, uncontrolled (HCC)  -     REFERRAL TO OPTOMETRY  -     MICROALBUMIN, UR, YANA W/ MICROALB/CREAT RATIO    5. Encounter for immunization  -     varicella-zoster recombinant, PF, (SHINGRIX) 50 mcg/0.5 mL susr injection; 0.5 mL by IntraMUSCular route once for 1 dose. 6. Wrist pain, acute, right  -     gabapentin (NEURONTIN) 100 mg capsule; Take 1 Cap by mouth three (3) times daily. Max Daily Amount: 300 mg.  -     XR WRIST RT AP/LAT;  Future

## 2019-07-09 LAB
ALBUMIN SERPL-MCNC: 3.9 G/DL (ref 3.5–5.5)
ALBUMIN/CREAT UR: 452.6 MG/G CREAT (ref 0–30)
ALBUMIN/GLOB SERPL: 0.9 {RATIO} (ref 1.2–2.2)
ALP SERPL-CCNC: 59 IU/L (ref 39–117)
ALT SERPL-CCNC: 14 IU/L (ref 0–44)
AST SERPL-CCNC: 16 IU/L (ref 0–40)
BILIRUB SERPL-MCNC: 0.5 MG/DL (ref 0–1.2)
BUN SERPL-MCNC: 23 MG/DL (ref 6–24)
BUN/CREAT SERPL: 16 (ref 9–20)
CALCIUM SERPL-MCNC: 9.2 MG/DL (ref 8.7–10.2)
CHLORIDE SERPL-SCNC: 98 MMOL/L (ref 96–106)
CO2 SERPL-SCNC: 27 MMOL/L (ref 20–29)
CREAT SERPL-MCNC: 1.41 MG/DL (ref 0.76–1.27)
CREAT UR-MCNC: 104.5 MG/DL
GLOBULIN SER CALC-MCNC: 4.5 G/DL (ref 1.5–4.5)
GLUCOSE SERPL-MCNC: 94 MG/DL (ref 65–99)
INTERPRETATION: NORMAL
Lab: NORMAL
MICROALBUMIN UR-MCNC: 473 UG/ML
POTASSIUM SERPL-SCNC: 3.9 MMOL/L (ref 3.5–5.2)
PROT SERPL-MCNC: 8.4 G/DL (ref 6–8.5)
SODIUM SERPL-SCNC: 142 MMOL/L (ref 134–144)
URATE SERPL-MCNC: 9.6 MG/DL (ref 3.7–8.6)

## 2019-11-06 RX ORDER — AMLODIPINE BESYLATE AND BENAZEPRIL HYDROCHLORIDE 10; 40 MG/1; MG/1
CAPSULE ORAL
Qty: 30 CAP | Refills: 6 | Status: SHIPPED | OUTPATIENT
Start: 2019-11-06 | End: 2020-07-21

## 2019-11-06 RX ORDER — CHLORTHALIDONE 25 MG/1
TABLET ORAL
Qty: 30 TAB | Refills: 6 | Status: SHIPPED | OUTPATIENT
Start: 2019-11-06 | End: 2020-07-07

## 2020-01-28 DIAGNOSIS — B35.6 TINEA CRURIS: ICD-10-CM

## 2020-01-30 RX ORDER — KETOCONAZOLE 20 MG/G
CREAM TOPICAL 2 TIMES DAILY
Qty: 60 OZ | Refills: 0 | Status: SHIPPED | OUTPATIENT
Start: 2020-01-30 | End: 2020-09-24 | Stop reason: SDUPTHER

## 2020-02-24 ENCOUNTER — OFFICE VISIT (OUTPATIENT)
Dept: FAMILY MEDICINE CLINIC | Age: 50
End: 2020-02-24

## 2020-02-24 VITALS
DIASTOLIC BLOOD PRESSURE: 91 MMHG | SYSTOLIC BLOOD PRESSURE: 150 MMHG | BODY MASS INDEX: 42.66 KG/M2 | HEART RATE: 62 BPM | TEMPERATURE: 98.3 F | HEIGHT: 72 IN | WEIGHT: 315 LBS | RESPIRATION RATE: 20 BRPM | OXYGEN SATURATION: 96 %

## 2020-02-24 DIAGNOSIS — M25.562 ACUTE PAIN OF LEFT KNEE: ICD-10-CM

## 2020-02-24 DIAGNOSIS — Z23 ENCOUNTER FOR IMMUNIZATION: ICD-10-CM

## 2020-02-24 DIAGNOSIS — I10 ESSENTIAL HYPERTENSION: ICD-10-CM

## 2020-02-24 DIAGNOSIS — E11.42 DIABETIC POLYNEUROPATHY ASSOCIATED WITH TYPE 2 DIABETES MELLITUS (HCC): ICD-10-CM

## 2020-02-24 LAB — HBA1C MFR BLD HPLC: 8.7 %

## 2020-02-24 RX ORDER — GABAPENTIN 100 MG/1
100 CAPSULE ORAL 3 TIMES DAILY
Qty: 90 CAP | Refills: 0 | Status: SHIPPED | OUTPATIENT
Start: 2020-02-24 | End: 2021-03-23

## 2020-02-24 NOTE — PROGRESS NOTES
Name and  verified      Chief Complaint   Patient presents with    Diabetes     Follow Up         Health Maintenance reviewed-discussed with patient. Patient declined flu vaccine. 1. Have you been to the ER, urgent care clinic since your last visit? Hospitalized since your last visit? no    2. Have you seen or consulted any other health care providers outside of the [address] [place] [address] since your last visit? Include any pap smears or colon screening. Yes, Dental office visit 2020. Blood pressure elevated. Dr Christine Caraballo notified. Will recheck blood pressure 150/91.

## 2020-02-24 NOTE — PROGRESS NOTES
HISTORY OF PRESENT ILLNESS  Mary Hunter is a 44 y.o. male. HPI    DMtype II  Patient also present for diabetic check,  the patient has been compliancy w/ meds, patient also states that the pt is trying to have a diabetic diet, and eat less of carbohydrates, since last visit patient has been more active with daily walking, patient also states that there is a home monitoring glucose device  usually averaging around 130 , +++ Rf needed for today. This time patient denies  tingling sensation, has no polyurea and polydipsia, last a1c was not at target of 7.8% %     Last urine microalbumin 2019 and was abnormal, patient currently on ACE inhibitor. HTN  Today pt present for Bp check and the patient stating that so far there has been a Compliancy w/ the bp meds, having had the low salt diet and try to the best of pt's knowledge to avoid smoked meats, the patient has been active life style and does do the daily walking,  , today the pt denies Chest Pain, has no legs swelling no lightheadedness,  the pat has not been feeling anxious, and  Has not been feeling stressed out, otherwise feeling better since the last visit  Feeling better since the last visit. Current Outpatient Medications   Medication Sig Dispense Refill    varicella-zoster recombinant, PF, (SHINGRIX) 50 mcg/0.5 mL susr injection 0.5 mL by IntraMUSCular route once for 1 dose. 0.5 mL 0    ketoconazole (NIZORAL) 2 % topical cream Apply  to affected area two (2) times a day.  60 oz 0    chlorthalidone (HYGROTEN) 25 mg tablet TAKE 1 TABLET BY MOUTH ONCE DAILY 30 Tab 6    amLODIPine-benazepril (LOTREL) 10-40 mg per capsule TAKE 1 CAPSULE BY MOUTH ONCE DAILY 30 Cap 6    metOLazone (ZAROXOLYN) 5 mg tablet TAKE ONE TABLET BY MOUTH ONCE DAILY 90 Tab 3    ergocalciferol (ERGOCALCIFEROL) 50,000 unit capsule TAKE 1 CAPSULE BY MOUTH EVERY 7 DAYS 12 Cap 6    albuterol (PROAIR HFA) 90 mcg/actuation inhaler Take 1 Puff by inhalation every six (6) hours as needed for Wheezing or Shortness of Breath. 1 Inhaler 11    empagliflozin-linagliptin (GLYXAMBI) 25-5 mg tab Take 1 Tab by mouth daily. 90 Tab 6    glipiZIDE (GLUCOTROL) 10 mg tablet TAKE ONE & ONE-HALF TABLETS BY MOUTH TWICE DAILY 270 Tab 4    metFORMIN (GLUCOPHAGE) 1,000 mg tablet TAKE ONE TABLET BY MOUTH TWICE DAILY WITH MEALS 180 Tab 4    glucose blood VI test strips (ASCENSIA AUTODISC VI, ONE TOUCH ULTRA TEST VI) strip by Does Not Apply route. As directed 1 Package 11    Lancets Misc by Does Not Apply route. As directed   1 Package 11    gabapentin (NEURONTIN) 100 mg capsule Take 1 Cap by mouth three (3) times daily. Max Daily Amount: 300 mg. 60 Cap 0    potassium chloride (K-DUR, KLOR-CON) 20 mEq tablet Take 1 Tab by mouth three (3) times daily. 270 Tab 2    orlistat (XENICAL) 120 mg capsule Take 120 mg by mouth three (3) times daily (with meals). 90 Cap 6    diclofenac (VOLTAREN) 1 % gel Apply 2 g to affected area four (4) times daily. (Patient taking differently: Apply 2 g to affected area as needed.) 100 g 2    betamethasone dipropionate (DIPROSONE) 0.05 % topical cream Apply  to affected area two (2) times a day. (Patient not taking: Reported on 6/18/2018) 15 g 0    aspirin 81 mg tablet Take 1 Tab by mouth daily. 30 Tab 11     Allergies   Allergen Reactions    Statins-Hmg-Coa Reductase Inhibitors Myalgia    Wheat Hives     Past Medical History:   Diagnosis Date    Acute pain of left knee 8/21/2017    Acute pain of left knee 8/21/2017    Diabetes mellitus type II, uncontrolled ([address] 8/8/2014    Diabetic polyneuropathy associated with type 2 diabetes mellitus ([address] 11/22/2016    DM (diabetes mellitus) ([address] 6/15/2010    GERD (gastroesophageal reflux disease) 2/21/2014    Hypercholesterolemia 6/15/2010    Hypertension     Prostate infarction 3/28/2016    Screening for depression 3/30/2014    Tinea cruris 3/13/2014    Urinary frequency 3/28/2016     History reviewed.  No pertinent surgical history. Family History   Problem Relation Age of Onset    Lung Disease Mother     Hypertension Father     Heart Disease Father      Social History     Tobacco Use    Smoking status: Never Smoker    Smokeless tobacco: Never Used   Substance Use Topics    Alcohol use: No      Lab Results   Component Value Date/Time    Hemoglobin A1c 9.1 (H) 06/18/2018 11:35 AM    Hemoglobin A1c 8.6 (H) 08/08/2014 12:18 PM    Hemoglobin A1c 8.6 (H) 02/21/2014 10:04 AM    Glucose 94 07/08/2019 11:01 AM    Glucose (POC) 292 (H) 06/15/2010 05:14 AM    Glucose POC 205mg/dl 06/15/2010 03:13 PM    Microalbumin/Creat ratio (mg/g creat) 117 (H) 06/15/2010 04:51 PM    Microalb/Creat ratio (ug/mg creat.) 452.6 (H) 07/08/2019 10:48 AM    Microalbumin,urine random 6.79 06/15/2010 04:51 PM    LDL, calculated 76 06/18/2018 11:35 AM    Creatinine 1.41 (H) 07/08/2019 11:01 AM      Lab Results   Component Value Date/Time    Cholesterol, total 156 04/08/2019 11:30 AM    HDL Cholesterol 46 04/08/2019 11:30 AM    LDL, calculated 76 06/18/2018 11:35 AM    Triglyceride 124 06/18/2018 11:35 AM    CHOL/HDL Ratio 4.0 08/17/2010 10:34 AM     Lab Results   Component Value Date/Time    ALT (SGPT) 14 07/08/2019 11:01 AM    AST (SGOT) 16 07/08/2019 11:01 AM    Alk. phosphatase 59 07/08/2019 11:01 AM    Bilirubin, total 0.5 07/08/2019 11:01 AM    Albumin 3.9 07/08/2019 11:01 AM    Protein, total 8.4 07/08/2019 11:01 AM    INR 1.2 (H) 06/12/2010 10:17 PM    Prothrombin time 12.7 (H) 06/12/2010 10:17 PM    PLATELET 297 63/03/7027 11:30 AM        Review of Systems   Constitutional: Negative for chills and fever. HENT: Negative for nosebleeds. Eyes: Negative for pain. Respiratory: Negative for cough and wheezing. Cardiovascular: Negative for chest pain and leg swelling. Gastrointestinal: Negative for constipation, diarrhea and nausea. Genitourinary: Negative for frequency. Musculoskeletal: Negative for joint pain and myalgias.    Skin: Negative for rash. Neurological: Negative for loss of consciousness. Endo/Heme/Allergies: Does not bruise/bleed easily. Psychiatric/Behavioral: Negative for depression. The patient is not nervous/anxious and does not have insomnia. All other systems reviewed and are negative. 132/88 145/82 150/91    Physical Exam  Vitals signs and nursing note reviewed. Constitutional:       Appearance: He is well-developed. HENT:      Head: Normocephalic and atraumatic. Mouth/Throat:      Pharynx: No oropharyngeal exudate. Eyes:      Conjunctiva/sclera: Conjunctivae normal.      Pupils: Pupils are equal, round, and reactive to light. Neck:      Musculoskeletal: Normal range of motion and neck supple. Thyroid: No thyromegaly. Vascular: No JVD. Cardiovascular:      Rate and Rhythm: Normal rate and regular rhythm. Heart sounds: Normal heart sounds. No murmur. No friction rub. Pulmonary:      Effort: Pulmonary effort is normal. No respiratory distress. Breath sounds: Normal breath sounds. No wheezing or rales. Abdominal:      General: Bowel sounds are normal. There is no distension. Palpations: Abdomen is soft. Tenderness: There is no abdominal tenderness. Musculoskeletal:         General: No tenderness. Lymphadenopathy:      Cervical: No cervical adenopathy. Skin:     General: Skin is warm. Findings: No erythema or rash. Neurological:      Mental Status: He is alert and oriented to person, place, and time. Deep Tendon Reflexes: Reflexes are normal and symmetric. Psychiatric:         Behavior: Behavior normal.         ASSESSMENT and PLAN  Diagnoses and all orders for this visit:    1. Type II diabetes mellitus with complication, uncontrolled (HCC)  -     REFERRAL TO OPTOMETRY  -     LIPID PANEL  -     TSH 3RD GENERATION  -     CBC W/O DIFF  -     METABOLIC PANEL, COMPREHENSIVE  -     URIC ACID  -     AMB POC HEMOGLOBIN A1C    2.  Encounter for immunization  -     varicella-zoster recombinant, PF, (SHINGRIX) 50 mcg/0.5 mL susr injection; 0.5 mL by IntraMUSCular route once for 1 dose. 3. Acute pain of left knee  -     URIC ACID    4. BMI 50.0-59.9, adult ([facility] [address]    5. Diabetic polyneuropathy associated with type 2 diabetes mellitus (HCC)  -     LIPID PANEL  -     TSH 3RD GENERATION  -     CBC W/O DIFF  -     METABOLIC PANEL, COMPREHENSIVE  -     gabapentin (NEURONTIN) 100 mg capsule; Take 1 Cap by mouth three (3) times daily. Max Daily Amount: 300 mg.  -     REFERRAL TO NEUROLOGY    6. Essential hypertension    Other orders  -     CKD REPORT  -     DIABETES PATIENT EDUCATION    pt not willing ot change any of his meds at this time like and wants to cont with life style mode ,wt not target goal, the appropriate diet discussed. lifelong compliance to reduce risk is stressed. A regular exercise program,  maintain normal body weight, fitness,  to avoid fast food Advised, recheck for flp and lft in 3 months rtc fasting, meds side effect and compliance advised.

## 2020-02-24 NOTE — PATIENT INSTRUCTIONS
Counting Carbohydrates: Care Instructions  Your Care Instructions    You don't have to eat special foods when you have diabetes. You just have to be careful to eat healthy foods. Carbohydrates (carbs) raise blood sugar higher and quicker than any other nutrient. Carbs are found in desserts, breads and cereals, and fruit. They're also in starchy vegetables. These include potatoes, corn, and grains such as rice and pasta. Carbs are also in milk and yogurt. The more carbs you eat at one time, the higher your blood sugar will rise. Spreading carbs all through the day helps keep your blood sugar levels within your target range. Counting carbs is one of the best ways to keep your blood sugar under control. If you use insulin, counting carbs helps you match the right amount of insulin to the number of grams of carbs in a meal. Then you can change your diet and insulin dose as needed. Testing your blood sugar several times a day can help you learn how carbs affect your blood sugar. A registered dietitian or certified diabetes educator can help you plan meals and snacks. Follow-up care is a key part of your treatment and safety. Be sure to make and go to all appointments, and call your doctor if you are having problems. It's also a good idea to know your test results and keep a list of the medicines you take. How can you care for yourself at home? Know your daily amount of carbohydrates  Your daily amount depends on several things, such as your weight, how active you are, which diabetes medicines you take, and what your goals are for your blood sugar levels. A registered dietitian or certified diabetes educator can help you plan how many carbs to include in each meal and snack. For most adults, a guideline for the daily amount of carbs is:  · 45 to 60 grams at each meal. That's about the same as 3 to 4 carbohydrate servings. · 15 to 20 grams at each snack. That's about the same as 1 carbohydrate serving.   Count carbs  Counting carbs lets you know how much rapid-acting insulin to take before you eat. If you use an insulin pump, you get a constant rate of insulin during the day. So the pump must be programmed at meals. This gives you extra insulin to cover the rise in blood sugar after meals. If you take insulin:  · Learn your own insulin-to-carb ratio. You and your diabetes health professional will figure out the ratio. You can do this by testing your blood sugar after meals. For example, you may need a certain amount of insulin for every 15 grams of carbs. · Add up the carb grams in a meal. Then you can figure out how many units of insulin to take based on your insulin-to-carb ratio. · Exercise lowers blood sugar. You can use less insulin than you would if you were not doing exercise. Keep in mind that timing matters. If you exercise within 1 hour after a meal, your body may need less insulin for that meal than it would if you exercised 3 hours after the meal. Test your blood sugar to find out how exercise affects your need for insulin. If you do or don't take insulin:  · Look at labels on packaged foods. This can tell you how many carbs are in a serving. You can also use guides from the American Diabetes Association. · Be aware of portions, or serving sizes. If a package has two servings and you eat the whole package, you need to double the number of grams of carbohydrate listed for one serving. · Protein, fat, and fiber do not raise blood sugar as much as carbs do. If you eat a lot of these nutrients in a meal, your blood sugar will rise more slowly than it would otherwise. Eat from all food groups  · Eat at least three meals a day. · Plan meals to include food from all the food groups. The food groups include grains, fruits, dairy, proteins, and vegetables. · Talk to your dietitian or diabetes educator about ways to add limited amounts of sweets into your meal plan. · If you drink alcohol, talk to your doctor. It may not be recommended when you are taking certain diabetes medicines. Where can you learn more? Go to http://alma-isabelle.info/. Enter T814 in the search box to learn more about \"Counting Carbohydrates: Care Instructions. \"  Current as of: April 16, 2019  Content Version: 12.2  © 7538-2888 [employer]. Care instructions adapted under license by [employer] (which disclaims liability or warranty for this information). If you have questions about a medical condition or this instruction, always ask your healthcare professional. [address] any warranty or liability for your use of this information. Learning About Diabetes and Exercise  Can you exercise if you have diabetes? When you have diabetes, it's important to get regular exercise. This helps control your blood sugar level. You can still play sports, run, ride a bike, go swimming, and do other activities when you have diabetes. How can exercise help you manage diabetes? Your body turns the food you eat into glucose, a type of sugar. You need this sugar for energy. When you have diabetes, the sugar builds up in your blood. But when you exercise, your body uses sugar. This helps keep it from building up in your blood and results in lower blood sugar and better control of diabetes. Exercise may help you in other ways too. It can help you reach and stay at a healthy weight. It also helps improve blood pressure and cholesterol, which can reduce the risk of heart disease. Exercise can make you feel stronger and happier. It can help you relax and sleep better, and give you confidence in other things you do. How can you exercise safely? Before you start a new exercise program, talk to your doctor about how and when to exercise. You may need to have a medical exam and tests before you begin.  Some types of exercise can be harmful if your diabetes is causing other problems, such as problems with your feet. Your doctor can tell you what types of exercise are good choices for you. These tips can help you exercise safely when you have diabetes. If your diabetes is controlled by diet or medicine that doesn't lower your blood sugar, you don't need to eat a snack before you exercise. · Check your blood sugar before you exercise. And be careful about what you eat. ? If your blood sugar is less than 100, eat a carbohydrate snack before you exercise. ? Be careful when you exercise if your blood sugar is over 300. High blood sugar can make you dehydrated. And that makes your blood sugar levels go even higher. If you have ketones in your blood or urine and your blood sugar is over 300, do not exercise. · Don't try to do too much at first. Build up your exercise program bit by bit. Try to get at least 30 minutes of exercise on most days of the week. Walking is a good choice. You also may want to do other activities, such as riding a bike or swimming. You might try running or gardening. Try to include muscle-strengthening exercises at least 2 times a week. These exercises include push-ups and weight training. You can also use rubber tubing or stretch bands. You stretch or pull the tubing or band to build muscle strength. If you want to exercise more, slowly increase how hard or long you exercise. · You may get symptoms of low blood sugar during exercise or up to 24 hours later. Some symptoms of low blood sugar, such as sweating, a fast heartbeat, or feeling tired, can be confused with what can happen anytime you exercise. Other symptoms may include feeling anxious, dizzy, weak, or shaky. So it's a good idea to check your blood sugar again. · You can treat low blood sugar by eating or drinking something that has 15 grams of carbohydrate. These should be quick-sugar foods.  [employer] foods such as fruit juice, regular (not diet) soda, glucose tablets, hard candy, or raisins can help raise blood sugar. Check your blood sugar level again 15 minutes after having a quick-sugar food to make sure your level is getting back to your target range. · Drink plenty of water before, during, and after you exercise. · Wear medical alert jewelry that says you have diabetes. You can buy this at most drugstores. · Pay attention to your body. If you are used to exercise and notice that you can't do as much as usual, talk to your doctor. Follow-up care is a key part of your treatment and safety. Be sure to make and go to all appointments, and call your doctor if you are having problems. It's also a good idea to know your test results and keep a list of the medicines you take. Where can you learn more? Go to http://alma-isabelle.info/. Enter X480 in the search box to learn more about \"Learning About Diabetes and Exercise. \"  Current as of: April 16, 2019  Content Version: 12.2  © 0383-6819 [employer]. Care instructions adapted under license by [employer] (which disclaims liability or warranty for this information). If you have questions about a medical condition or this instruction, always ask your healthcare professional. [address] any warranty or liability for your use of this information.

## 2020-02-25 LAB
ALBUMIN SERPL-MCNC: 3.7 G/DL (ref 3.8–4.9)
ALBUMIN/GLOB SERPL: 0.8 {RATIO} (ref 1.2–2.2)
ALP SERPL-CCNC: 63 IU/L (ref 39–117)
ALT SERPL-CCNC: 25 IU/L (ref 0–44)
AST SERPL-CCNC: 17 IU/L (ref 0–40)
BILIRUB SERPL-MCNC: 0.8 MG/DL (ref 0–1.2)
BUN SERPL-MCNC: 20 MG/DL (ref 6–24)
BUN/CREAT SERPL: 14 (ref 9–20)
CALCIUM SERPL-MCNC: 9.4 MG/DL (ref 8.7–10.2)
CHLORIDE SERPL-SCNC: 97 MMOL/L (ref 96–106)
CHOLEST SERPL-MCNC: 161 MG/DL (ref 100–199)
CO2 SERPL-SCNC: 26 MMOL/L (ref 20–29)
CREAT SERPL-MCNC: 1.43 MG/DL (ref 0.76–1.27)
ERYTHROCYTE [DISTWIDTH] IN BLOOD BY AUTOMATED COUNT: 15.3 % (ref 11.6–15.4)
GLOBULIN SER CALC-MCNC: 4.5 G/DL (ref 1.5–4.5)
GLUCOSE SERPL-MCNC: 85 MG/DL (ref 65–99)
HCT VFR BLD AUTO: 49.1 % (ref 37.5–51)
HDLC SERPL-MCNC: 47 MG/DL
HGB BLD-MCNC: 16.5 G/DL (ref 13–17.7)
INTERPRETATION: NORMAL
LDLC SERPL CALC-MCNC: 93 MG/DL (ref 0–99)
Lab: NORMAL
MCH RBC QN AUTO: 27.5 PG (ref 26.6–33)
MCHC RBC AUTO-ENTMCNC: 33.6 G/DL (ref 31.5–35.7)
MCV RBC AUTO: 82 FL (ref 79–97)
PLATELET # BLD AUTO: 278 X10E3/UL (ref 150–450)
POTASSIUM SERPL-SCNC: 3.9 MMOL/L (ref 3.5–5.2)
PROT SERPL-MCNC: 8.2 G/DL (ref 6–8.5)
RBC # BLD AUTO: 5.99 X10E6/UL (ref 4.14–5.8)
SODIUM SERPL-SCNC: 140 MMOL/L (ref 134–144)
TRIGL SERPL-MCNC: 106 MG/DL (ref 0–149)
TSH SERPL DL<=0.005 MIU/L-ACNC: 2.86 UIU/ML (ref 0.45–4.5)
URATE SERPL-MCNC: 9.3 MG/DL (ref 3.7–8.6)
VLDLC SERPL CALC-MCNC: 21 MG/DL (ref 5–40)
WBC # BLD AUTO: 11.8 X10E3/UL (ref 3.4–10.8)

## 2020-03-16 DIAGNOSIS — E78.2 MIXED HYPERLIPIDEMIA: ICD-10-CM

## 2020-03-16 DIAGNOSIS — E55.9 VITAMIN D DEFICIENCY: ICD-10-CM

## 2020-03-16 DIAGNOSIS — I10 ESSENTIAL HYPERTENSION: ICD-10-CM

## 2020-03-16 DIAGNOSIS — E03.8 TSH (THYROID-STIMULATING HORMONE DEFICIENCY): ICD-10-CM

## 2020-03-16 RX ORDER — ERGOCALCIFEROL 1.25 MG/1
CAPSULE ORAL
Qty: 12 CAP | Refills: 0 | Status: SHIPPED | OUTPATIENT
Start: 2020-03-16 | End: 2020-09-24 | Stop reason: SDUPTHER

## 2020-03-16 RX ORDER — METOLAZONE 5 MG/1
TABLET ORAL
Qty: 90 TAB | Refills: 0 | Status: CANCELLED | OUTPATIENT
Start: 2020-03-16

## 2020-03-16 RX ORDER — METFORMIN HYDROCHLORIDE 1000 MG/1
TABLET ORAL
Qty: 180 TAB | Refills: 4 | Status: CANCELLED | OUTPATIENT
Start: 2020-03-16

## 2020-03-16 RX ORDER — METOLAZONE 5 MG/1
TABLET ORAL
Qty: 90 TAB | Refills: 0 | Status: SHIPPED | OUTPATIENT
Start: 2020-03-16 | End: 2020-07-30 | Stop reason: SDUPTHER

## 2020-03-16 RX ORDER — GLIPIZIDE 10 MG/1
TABLET ORAL
Qty: 270 TAB | Refills: 0 | Status: SHIPPED | OUTPATIENT
Start: 2020-03-16 | End: 2020-09-28

## 2020-03-16 RX ORDER — ERGOCALCIFEROL 1.25 MG/1
CAPSULE ORAL
Qty: 12 CAP | Refills: 0 | Status: CANCELLED | OUTPATIENT
Start: 2020-03-16

## 2020-05-18 RX ORDER — EMPAGLIFLOZIN AND LINAGLIPTIN 25; 5 MG/1; MG/1
TABLET, FILM COATED ORAL
Qty: 90 TAB | Refills: 0 | Status: SHIPPED | OUTPATIENT
Start: 2020-05-18 | End: 2020-08-12 | Stop reason: SDUPTHER

## 2020-07-07 RX ORDER — CHLORTHALIDONE 25 MG/1
TABLET ORAL
Qty: 30 TAB | Refills: 0 | Status: SHIPPED | OUTPATIENT
Start: 2020-07-07 | End: 2020-09-24 | Stop reason: SDUPTHER

## 2020-07-21 RX ORDER — AMLODIPINE BESYLATE AND BENAZEPRIL HYDROCHLORIDE 10; 40 MG/1; MG/1
CAPSULE ORAL
Qty: 30 CAP | Refills: 0 | Status: SHIPPED | OUTPATIENT
Start: 2020-07-21 | End: 2020-10-05 | Stop reason: SDUPTHER

## 2020-07-31 RX ORDER — METOLAZONE 5 MG/1
TABLET ORAL
Qty: 90 TAB | Refills: 0 | Status: SHIPPED | OUTPATIENT
Start: 2020-07-31 | End: 2020-11-16 | Stop reason: SDUPTHER

## 2020-08-12 RX ORDER — EMPAGLIFLOZIN AND LINAGLIPTIN 25; 5 MG/1; MG/1
1 TABLET, FILM COATED ORAL DAILY
Qty: 90 TAB | Refills: 0 | Status: SHIPPED | OUTPATIENT
Start: 2020-08-12 | End: 2020-11-23

## 2020-08-12 NOTE — TELEPHONE ENCOUNTER
Request for glyxambi 25-5mg every day. Last office visit 2/24/20, next office visit none pending.  Refill pending for provider approval.

## 2020-09-24 DIAGNOSIS — I10 ESSENTIAL HYPERTENSION: ICD-10-CM

## 2020-09-24 DIAGNOSIS — B35.6 TINEA CRURIS: ICD-10-CM

## 2020-09-24 DIAGNOSIS — E55.9 VITAMIN D DEFICIENCY: ICD-10-CM

## 2020-09-24 DIAGNOSIS — E03.8 TSH (THYROID-STIMULATING HORMONE DEFICIENCY): ICD-10-CM

## 2020-09-24 DIAGNOSIS — E78.2 MIXED HYPERLIPIDEMIA: ICD-10-CM

## 2020-09-28 RX ORDER — KETOCONAZOLE 20 MG/G
CREAM TOPICAL 2 TIMES DAILY
Qty: 60 OZ | Refills: 0 | Status: SHIPPED | OUTPATIENT
Start: 2020-09-28 | End: 2021-07-12

## 2020-09-28 RX ORDER — ERGOCALCIFEROL 1.25 MG/1
CAPSULE ORAL
Qty: 12 CAP | Refills: 0 | Status: SHIPPED | OUTPATIENT
Start: 2020-09-28 | End: 2021-03-04

## 2020-09-28 RX ORDER — GLIPIZIDE 10 MG/1
TABLET ORAL
Qty: 270 TAB | Refills: 0 | Status: SHIPPED | OUTPATIENT
Start: 2020-09-28 | End: 2021-03-23 | Stop reason: SDUPTHER

## 2020-09-28 RX ORDER — CHLORTHALIDONE 25 MG/1
25 TABLET ORAL DAILY
Qty: 90 TAB | Refills: 4 | Status: SHIPPED | OUTPATIENT
Start: 2020-09-28 | End: 2021-11-26

## 2020-09-28 RX ORDER — GLIPIZIDE 10 MG/1
TABLET ORAL
Qty: 270 TAB | Refills: 0 | Status: SHIPPED | OUTPATIENT
Start: 2020-09-28 | End: 2021-08-31

## 2020-10-07 RX ORDER — AMLODIPINE BESYLATE AND BENAZEPRIL HYDROCHLORIDE 10; 40 MG/1; MG/1
1 CAPSULE ORAL DAILY
Qty: 30 CAP | Refills: 5 | Status: SHIPPED | OUTPATIENT
Start: 2020-10-07 | End: 2021-04-27

## 2020-11-16 DIAGNOSIS — E03.8 TSH (THYROID-STIMULATING HORMONE DEFICIENCY): ICD-10-CM

## 2020-11-16 DIAGNOSIS — E55.9 VITAMIN D DEFICIENCY: ICD-10-CM

## 2020-11-16 DIAGNOSIS — E78.2 MIXED HYPERLIPIDEMIA: ICD-10-CM

## 2020-11-16 DIAGNOSIS — I10 ESSENTIAL HYPERTENSION: ICD-10-CM

## 2020-11-19 RX ORDER — METOLAZONE 5 MG/1
TABLET ORAL
Qty: 90 TAB | Refills: 0 | Status: SHIPPED | OUTPATIENT
Start: 2020-11-19 | End: 2021-03-04

## 2020-11-19 RX ORDER — METFORMIN HYDROCHLORIDE 1000 MG/1
TABLET ORAL
Qty: 180 TAB | Refills: 4 | Status: SHIPPED | OUTPATIENT
Start: 2020-11-19 | End: 2022-03-18

## 2020-11-23 RX ORDER — EMPAGLIFLOZIN AND LINAGLIPTIN 25; 5 MG/1; MG/1
TABLET, FILM COATED ORAL
Qty: 90 TAB | Refills: 0 | Status: SHIPPED | OUTPATIENT
Start: 2020-11-23 | End: 2021-03-04

## 2021-01-21 ENCOUNTER — PATIENT MESSAGE (OUTPATIENT)
Dept: FAMILY MEDICINE CLINIC | Age: 51
End: 2021-01-21

## 2021-03-02 DIAGNOSIS — I10 ESSENTIAL HYPERTENSION: ICD-10-CM

## 2021-03-02 DIAGNOSIS — E78.2 MIXED HYPERLIPIDEMIA: ICD-10-CM

## 2021-03-02 DIAGNOSIS — E55.9 VITAMIN D DEFICIENCY: ICD-10-CM

## 2021-03-02 DIAGNOSIS — E03.8 TSH (THYROID-STIMULATING HORMONE DEFICIENCY): ICD-10-CM

## 2021-03-04 RX ORDER — METOLAZONE 5 MG/1
TABLET ORAL
Qty: 90 TAB | Refills: 0 | Status: SHIPPED | OUTPATIENT
Start: 2021-03-04 | End: 2021-07-08

## 2021-03-04 RX ORDER — EMPAGLIFLOZIN AND LINAGLIPTIN 25; 5 MG/1; MG/1
TABLET, FILM COATED ORAL
Qty: 90 TAB | Refills: 0 | Status: SHIPPED | OUTPATIENT
Start: 2021-03-04 | End: 2021-07-08

## 2021-03-04 RX ORDER — ERGOCALCIFEROL 1.25 MG/1
CAPSULE ORAL
Qty: 12 CAP | Refills: 0 | Status: SHIPPED | OUTPATIENT
Start: 2021-03-04 | End: 2021-04-27

## 2021-03-23 ENCOUNTER — VIRTUAL VISIT (OUTPATIENT)
Dept: FAMILY MEDICINE CLINIC | Age: 51
End: 2021-03-23

## 2021-03-23 DIAGNOSIS — E11.42 DIABETIC POLYNEUROPATHY ASSOCIATED WITH TYPE 2 DIABETES MELLITUS (HCC): ICD-10-CM

## 2021-03-23 DIAGNOSIS — Z71.89 ADVICE GIVEN ABOUT COVID-19 VIRUS INFECTION: Primary | ICD-10-CM

## 2021-03-23 PROCEDURE — 99213 OFFICE O/P EST LOW 20 MIN: CPT | Performed by: FAMILY MEDICINE

## 2021-03-23 NOTE — PROGRESS NOTES
Chief Complaint   Patient presents with    Concern For [id number]-65 (Coronavirus)     1. Have you been to the ER, urgent care clinic since your last visit? Hospitalized since your last visit? No    2. Have you seen or consulted any other health care providers outside of the [address] [place] [address] since your last visit? Include any pap smears or colon screening. No    Call placed to pt. Verified patient with two type of identifiers. Requesting to discuss covid vaccine.       Blood sugar 110s before breakfast

## 2021-03-23 NOTE — PROGRESS NOTES
HISTORY OF PRESENT ILLNESS  Joan Rice is a 44 y.o. male. Pt's main concerns were provided on virtual visit, a telemed format,  pt is w/ comorbid medical history and unaware of been exposed to covid-19 individual, Pt Have been staying at home for couple of wks,  pt has no fever no cough no dyspnea, no ha, not dizzy, nl smell nl taste, no N/V/D, no body ache, patient is concerned about the available COVID-19 vaccinations patient does not have enough information for which one to be available at this time, patient need to know each vaccination side effect how effective they are on any dosages as to be given where the vaccinations are available patient is willing to get vaccinated just requesting further information       Current Outpatient Medications   Medication Sig Dispense Refill    Glyxambi 25-5 mg tab Take 1 tablet by mouth once daily 90 Tab 0    metOLazone (ZAROXOLYN) 5 mg tablet Take 1 tablet by mouth once daily 90 Tab 0    ergocalciferol (ERGOCALCIFEROL) 1,250 mcg (50,000 unit) capsule Take 1 capsule by mouth once a week 12 Cap 0    metFORMIN (GLUCOPHAGE) 1,000 mg tablet TAKE ONE TABLET BY MOUTH TWICE DAILY WITH MEALS 180 Tab 4    amLODIPine-benazepril (LOTREL) 10-40 mg per capsule Take 1 Cap by mouth daily. 30 Cap 5    glipiZIDE (GLUCOTROL) 10 mg tablet TAKE 1 & 1/2 (ONE & ONE-HALF) TABLETS BY MOUTH TWICE DAILY 270 Tab 0    chlorthalidone (HYGROTEN) 25 mg tablet Take 1 Tab by mouth daily. 90 Tab 4    gabapentin (NEURONTIN) 100 mg capsule Take 1 Cap by mouth three (3) times daily. Max Daily Amount: 300 mg. 60 Cap 0    potassium chloride (K-DUR, KLOR-CON) 20 mEq tablet Take 1 Tab by mouth three (3) times daily. 270 Tab 2    albuterol (PROAIR HFA) 90 mcg/actuation inhaler Take 1 Puff by inhalation every six (6) hours as needed for Wheezing or Shortness of Breath. 1 Inhaler 11    orlistat (XENICAL) 120 mg capsule Take 120 mg by mouth three (3) times daily (with meals).  90 Cap 6    aspirin 81 mg tablet Take 1 Tab by mouth daily. 30 Tab 11    glucose blood VI test strips (ASCENSIA AUTODISC VI, ONE TOUCH ULTRA TEST VI) strip by Does Not Apply route. As directed 1 Package 11    Lancets Misc by Does Not Apply route. As directed   1 Package 11    ketoconazole (NIZORAL) 2 % topical cream Apply  to affected area two (2) times a day. 60 oz 0    diclofenac (VOLTAREN) 1 % gel Apply 2 g to affected area four (4) times daily. (Patient taking differently: Apply 2 g to affected area as needed.) 100 g 2    betamethasone dipropionate (DIPROSONE) 0.05 % topical cream Apply  to affected area two (2) times a day. (Patient not taking: Reported on 6/18/2018) 15 g 0     Allergies   Allergen Reactions    Statins-Hmg-Coa Reductase Inhibitors Myalgia    Wheat Hives     Past Medical History:   Diagnosis Date    Acute pain of left knee 8/21/2017    Acute pain of left knee 8/21/2017    Diabetes mellitus type II, uncontrolled ([address] 8/8/2014    Diabetic polyneuropathy associated with type 2 diabetes mellitus ([address] 11/22/2016    DM (diabetes mellitus) ([address] 6/15/2010    GERD (gastroesophageal reflux disease) 2/21/2014    Hypercholesterolemia 6/15/2010    Hypertension     Prostate infarction 3/28/2016    Screening for depression 3/30/2014    Tinea cruris 3/13/2014    Urinary frequency 3/28/2016     History reviewed. No pertinent surgical history.   Family History   Problem Relation Age of Onset    Lung Disease Mother     Hypertension Father     Heart Disease Father      Social History     Tobacco Use    Smoking status: Never Smoker    Smokeless tobacco: Never Used   Substance Use Topics    Alcohol use: No      Lab Results   Component Value Date/Time    WBC 11.8 (H) 02/24/2020 01:02 PM    HGB 16.5 02/24/2020 01:02 PM    HCT 49.1 02/24/2020 01:02 PM    PLATELET 413 92/96/0144 01:02 PM    MCV 82 02/24/2020 01:02 PM     Lab Results   Component Value Date/Time    Hemoglobin A1c 9.1 (H) 06/18/2018 11:35 AM    Hemoglobin A1c 8.6 (H) 08/08/2014 12:18 PM    Hemoglobin A1c 8.6 (H) 02/21/2014 10:04 AM    Glucose 85 02/24/2020 01:02 PM    Glucose (POC) 292 (H) 06/15/2010 05:14 AM    Glucose POC 205mg/dl 06/15/2010 03:13 PM    Microalbumin/Creat ratio (mg/g creat) 117 (H) 06/15/2010 04:51 PM    Microalb/Creat ratio (ug/mg creat.) 452.6 (H) 07/08/2019 10:48 AM    Microalbumin,urine random 6.79 06/15/2010 04:51 PM    LDL, calculated 93 02/24/2020 01:02 PM    Creatinine 1.43 (H) 02/24/2020 01:02 PM         Review of Systems   Constitutional: Negative for chills, fever and malaise/fatigue. HENT: Negative for nosebleeds. Eyes: Negative for pain. Respiratory: Negative for cough and wheezing. Cardiovascular: Negative for chest pain and leg swelling. Gastrointestinal: Negative for constipation, diarrhea and nausea. Genitourinary: Negative for frequency. Musculoskeletal: Negative for joint pain and myalgias. Skin: Negative for rash. Neurological: Negative for loss of consciousness and headaches. Endo/Heme/Allergies: Does not bruise/bleed easily. Psychiatric/Behavioral: Negative for depression. The patient is not nervous/anxious and does not have insomnia. All other systems reviewed and are negative. Physical Exam  Constitutional:       Appearance: Normal appearance. HENT:      Head: Normocephalic and atraumatic. Nose: Nose normal. No congestion. Neurological:      Mental Status: He is alert and oriented to person, place, and time. Psychiatric:         Mood and Affect: Mood normal.         Behavior: Behavior normal.         Thought Content: Thought content normal.         Judgment: Judgment normal.         ASSESSMENT and PLAN  Diagnoses and all orders for this visit:    1. Advice given about COVID-19 virus infection    2. BMI 50.0-59.9, adult ([facility]    3.  Diabetic polyneuropathy associated with type 2 diabetes mellitus ([facility]      All the available treated vaccinations were detailed for the patient regarding the side effect efficacy availability and how the patient may be able to receive one  Patient advised to have the mask on most of the time, social distance and handwashing avoid crowded area pursuant to the emergency declaration under the [address] [facility] [address]  and [place] [address] [facility] authority and the [employer] Act, this Virtual Visit was conducted, with patient's consent, to reduce the patient's risk of exposure to COVID-19 and provide continuity of care for an established patient  Services were provided through a Video synchronous discussion virtually to substitute for in-person appointment.

## 2021-04-26 DIAGNOSIS — E03.8 TSH (THYROID-STIMULATING HORMONE DEFICIENCY): ICD-10-CM

## 2021-04-26 DIAGNOSIS — E78.2 MIXED HYPERLIPIDEMIA: ICD-10-CM

## 2021-04-26 DIAGNOSIS — E55.9 VITAMIN D DEFICIENCY: ICD-10-CM

## 2021-04-26 DIAGNOSIS — I10 ESSENTIAL HYPERTENSION: ICD-10-CM

## 2021-04-27 RX ORDER — AMLODIPINE BESYLATE AND BENAZEPRIL HYDROCHLORIDE 10; 40 MG/1; MG/1
CAPSULE ORAL
Qty: 30 CAP | Refills: 0 | Status: SHIPPED | OUTPATIENT
Start: 2021-04-27 | End: 2021-06-18

## 2021-04-27 RX ORDER — ERGOCALCIFEROL 1.25 MG/1
CAPSULE ORAL
Qty: 12 CAP | Refills: 0 | Status: SHIPPED | OUTPATIENT
Start: 2021-04-27 | End: 2021-12-27 | Stop reason: SDUPTHER

## 2021-04-29 RX ORDER — AMLODIPINE BESYLATE AND BENAZEPRIL HYDROCHLORIDE 10; 40 MG/1; MG/1
1 CAPSULE ORAL DAILY
Qty: 30 CAP | Refills: 5 | Status: SHIPPED | OUTPATIENT
Start: 2021-04-29 | End: 2022-02-23

## 2021-06-13 DIAGNOSIS — J45.21 MILD INTERMITTENT ASTHMA WITH ACUTE EXACERBATION: ICD-10-CM

## 2021-06-15 RX ORDER — ALBUTEROL SULFATE 90 UG/1
1 AEROSOL, METERED RESPIRATORY (INHALATION)
Qty: 1 INHALER | Refills: 11 | Status: SHIPPED | OUTPATIENT
Start: 2021-06-15 | End: 2022-01-03 | Stop reason: ALTCHOICE

## 2021-06-15 RX ORDER — AMLODIPINE BESYLATE AND BENAZEPRIL HYDROCHLORIDE 10; 40 MG/1; MG/1
1 CAPSULE ORAL DAILY
Qty: 30 CAPSULE | Refills: 5 | OUTPATIENT
Start: 2021-06-15

## 2021-06-15 NOTE — TELEPHONE ENCOUNTER
Patient requesting refills.    Last OV: 03/23/2021 (Virtual)  Next OV: N/A    Albuterol Inhaler:  Last filled: 01/07/2019

## 2021-06-18 RX ORDER — AMLODIPINE BESYLATE AND BENAZEPRIL HYDROCHLORIDE 10; 40 MG/1; MG/1
CAPSULE ORAL
Qty: 30 CAPSULE | Refills: 0 | Status: SHIPPED | OUTPATIENT
Start: 2021-06-18 | End: 2021-11-15 | Stop reason: SDUPTHER

## 2021-07-08 RX ORDER — EMPAGLIFLOZIN AND LINAGLIPTIN 25; 5 MG/1; MG/1
TABLET, FILM COATED ORAL
Qty: 90 TABLET | Refills: 0 | Status: SHIPPED | OUTPATIENT
Start: 2021-07-08 | End: 2021-10-26

## 2021-07-08 RX ORDER — METOLAZONE 5 MG/1
TABLET ORAL
Qty: 90 TABLET | Refills: 0 | Status: SHIPPED | OUTPATIENT
Start: 2021-07-08 | End: 2021-10-26

## 2021-07-12 DIAGNOSIS — B35.6 TINEA CRURIS: ICD-10-CM

## 2021-07-12 RX ORDER — KETOCONAZOLE 20 MG/G
CREAM TOPICAL
Qty: 60 G | Refills: 0 | Status: SHIPPED | OUTPATIENT
Start: 2021-07-12

## 2021-08-31 RX ORDER — GLIPIZIDE 10 MG/1
TABLET ORAL
Qty: 270 TABLET | Refills: 0 | Status: SHIPPED | OUTPATIENT
Start: 2021-08-31 | End: 2021-10-26 | Stop reason: SDUPTHER

## 2021-10-19 RX ORDER — EMPAGLIFLOZIN AND LINAGLIPTIN 25; 5 MG/1; MG/1
1 TABLET, FILM COATED ORAL DAILY
Qty: 90 TABLET | Refills: 0 | Status: CANCELLED | OUTPATIENT
Start: 2021-10-19

## 2021-10-19 RX ORDER — METOLAZONE 5 MG/1
5 TABLET ORAL DAILY
Qty: 90 TABLET | Refills: 0 | Status: CANCELLED | OUTPATIENT
Start: 2021-10-19

## 2021-10-26 RX ORDER — EMPAGLIFLOZIN AND LINAGLIPTIN 25; 5 MG/1; MG/1
1 TABLET, FILM COATED ORAL DAILY
Qty: 90 TABLET | Refills: 0 | Status: CANCELLED | OUTPATIENT
Start: 2021-10-26

## 2021-10-26 RX ORDER — EMPAGLIFLOZIN AND LINAGLIPTIN 25; 5 MG/1; MG/1
TABLET, FILM COATED ORAL
Qty: 90 TABLET | Refills: 0 | Status: SHIPPED | OUTPATIENT
Start: 2021-10-26 | End: 2022-02-04

## 2021-10-26 RX ORDER — METOLAZONE 5 MG/1
TABLET ORAL
Qty: 90 TABLET | Refills: 0 | Status: SHIPPED | OUTPATIENT
Start: 2021-10-26 | End: 2021-11-15 | Stop reason: SDUPTHER

## 2021-11-15 ENCOUNTER — OFFICE VISIT (OUTPATIENT)
Dept: FAMILY MEDICINE CLINIC | Age: 51
End: 2021-11-15
Payer: COMMERCIAL

## 2021-11-15 VITALS
RESPIRATION RATE: 18 BRPM | HEIGHT: 72 IN | WEIGHT: 315 LBS | SYSTOLIC BLOOD PRESSURE: 146 MMHG | DIASTOLIC BLOOD PRESSURE: 76 MMHG | OXYGEN SATURATION: 97 % | BODY MASS INDEX: 42.66 KG/M2 | HEART RATE: 54 BPM

## 2021-11-15 DIAGNOSIS — Z00.00 ROUTINE MEDICAL EXAM: ICD-10-CM

## 2021-11-15 DIAGNOSIS — Z12.11 SCREEN FOR COLON CANCER: ICD-10-CM

## 2021-11-15 DIAGNOSIS — Z00.00 LABORATORY EXAM ORDERED AS PART OF ROUTINE GENERAL MEDICAL EXAMINATION: ICD-10-CM

## 2021-11-15 DIAGNOSIS — R60.0 BILATERAL EDEMA OF LOWER EXTREMITY: ICD-10-CM

## 2021-11-15 DIAGNOSIS — Z23 ENCOUNTER FOR IMMUNIZATION: ICD-10-CM

## 2021-11-15 DIAGNOSIS — E78.2 MIXED HYPERLIPIDEMIA: ICD-10-CM

## 2021-11-15 DIAGNOSIS — E11.21 TYPE 2 DIABETES WITH NEPHROPATHY (HCC): Primary | ICD-10-CM

## 2021-11-15 DIAGNOSIS — Z12.5 PROSTATE CANCER SCREENING: ICD-10-CM

## 2021-11-15 LAB
ALBUMIN SERPL-MCNC: 3.2 G/DL (ref 3.5–5)
ALBUMIN/GLOB SERPL: 0.6 {RATIO} (ref 1.1–2.2)
ALP SERPL-CCNC: 61 U/L (ref 45–117)
ALT SERPL-CCNC: 21 U/L (ref 12–78)
ANION GAP SERPL CALC-SCNC: 6 MMOL/L (ref 5–15)
APPEARANCE UR: CLEAR
AST SERPL-CCNC: 16 U/L (ref 15–37)
BACTERIA URNS QL MICRO: ABNORMAL /HPF
BASOPHILS # BLD: 0 K/UL (ref 0–0.1)
BASOPHILS NFR BLD: 0 % (ref 0–1)
BILIRUB SERPL-MCNC: 0.6 MG/DL (ref 0.2–1)
BILIRUB UR QL: NEGATIVE
BUN SERPL-MCNC: 18 MG/DL (ref 6–20)
BUN/CREAT SERPL: 13 (ref 12–20)
CALCIUM SERPL-MCNC: 9.2 MG/DL (ref 8.5–10.1)
CHLORIDE SERPL-SCNC: 101 MMOL/L (ref 97–108)
CHOLEST SERPL-MCNC: 155 MG/DL
CO2 SERPL-SCNC: 31 MMOL/L (ref 21–32)
COLOR UR: ABNORMAL
CREAT SERPL-MCNC: 1.36 MG/DL (ref 0.7–1.3)
CREAT UR-MCNC: 91.7 MG/DL
DIFFERENTIAL METHOD BLD: ABNORMAL
EOSINOPHIL # BLD: 0.4 K/UL (ref 0–0.4)
EOSINOPHIL NFR BLD: 4 % (ref 0–7)
EPITH CASTS URNS QL MICRO: ABNORMAL /LPF
ERYTHROCYTE [DISTWIDTH] IN BLOOD BY AUTOMATED COUNT: 18 % (ref 11.5–14.5)
GLOBULIN SER CALC-MCNC: 5 G/DL (ref 2–4)
GLUCOSE SERPL-MCNC: 121 MG/DL (ref 65–100)
GLUCOSE UR STRIP.AUTO-MCNC: 500 MG/DL
HAV IGM SER QL: NONREACTIVE
HBA1C MFR BLD HPLC: 8 %
HBV CORE IGM SER QL: NONREACTIVE
HBV SURFACE AG SER QL: <0.1 INDEX
HBV SURFACE AG SER QL: NEGATIVE
HCT VFR BLD AUTO: 50.8 % (ref 36.6–50.3)
HCV AB SERPL QL IA: NONREACTIVE
HDLC SERPL-MCNC: 50 MG/DL
HDLC SERPL: 3.1 {RATIO} (ref 0–5)
HGB BLD-MCNC: 15.7 G/DL (ref 12.1–17)
HGB UR QL STRIP: NEGATIVE
HYALINE CASTS URNS QL MICRO: ABNORMAL /LPF (ref 0–5)
IMM GRANULOCYTES # BLD AUTO: 0 K/UL (ref 0–0.04)
IMM GRANULOCYTES NFR BLD AUTO: 0 % (ref 0–0.5)
KETONES UR QL STRIP.AUTO: NEGATIVE MG/DL
LDLC SERPL CALC-MCNC: 88.2 MG/DL (ref 0–100)
LEUKOCYTE ESTERASE UR QL STRIP.AUTO: NEGATIVE
LYMPHOCYTES # BLD: 1.5 K/UL (ref 0.8–3.5)
LYMPHOCYTES NFR BLD: 15 % (ref 12–49)
MCH RBC QN AUTO: 26.8 PG (ref 26–34)
MCHC RBC AUTO-ENTMCNC: 30.9 G/DL (ref 30–36.5)
MCV RBC AUTO: 86.8 FL (ref 80–99)
MICROALBUMIN UR-MCNC: 56.5 MG/DL
MICROALBUMIN/CREAT UR-RTO: 616 MG/G (ref 0–30)
MONOCYTES # BLD: 0.9 K/UL (ref 0–1)
MONOCYTES NFR BLD: 10 % (ref 5–13)
NEUTS SEG # BLD: 6.9 K/UL (ref 1.8–8)
NEUTS SEG NFR BLD: 71 % (ref 32–75)
NITRITE UR QL STRIP.AUTO: NEGATIVE
NRBC # BLD: 0 K/UL (ref 0–0.01)
NRBC BLD-RTO: 0 PER 100 WBC
PH UR STRIP: 6 [PH] (ref 5–8)
PLATELET # BLD AUTO: 251 K/UL (ref 150–400)
PMV BLD AUTO: 9.8 FL (ref 8.9–12.9)
POTASSIUM SERPL-SCNC: 3.8 MMOL/L (ref 3.5–5.1)
PROT SERPL-MCNC: 8.2 G/DL (ref 6.4–8.2)
PROT UR STRIP-MCNC: 100 MG/DL
PSA SERPL-MCNC: 0.4 NG/ML (ref 0.01–4)
RBC # BLD AUTO: 5.85 M/UL (ref 4.1–5.7)
RBC #/AREA URNS HPF: ABNORMAL /HPF (ref 0–5)
SODIUM SERPL-SCNC: 138 MMOL/L (ref 136–145)
SP GR UR REFRACTOMETRY: 1.03 (ref 1–1.03)
SP1: NORMAL
SP2: NORMAL
SP3: NORMAL
TRIGL SERPL-MCNC: 84 MG/DL (ref ?–150)
TSH SERPL DL<=0.05 MIU/L-ACNC: 2.63 UIU/ML (ref 0.36–3.74)
UROBILINOGEN UR QL STRIP.AUTO: 1 EU/DL (ref 0.2–1)
VLDLC SERPL CALC-MCNC: 16.8 MG/DL
WBC # BLD AUTO: 9.8 K/UL (ref 4.1–11.1)
WBC URNS QL MICRO: ABNORMAL /HPF (ref 0–4)

## 2021-11-15 PROCEDURE — 99396 PREV VISIT EST AGE 40-64: CPT | Performed by: FAMILY MEDICINE

## 2021-11-15 PROCEDURE — 83036 HEMOGLOBIN GLYCOSYLATED A1C: CPT | Performed by: FAMILY MEDICINE

## 2021-11-15 PROCEDURE — 3052F HG A1C>EQUAL 8.0%<EQUAL 9.0%: CPT | Performed by: FAMILY MEDICINE

## 2021-11-15 RX ORDER — METOLAZONE 5 MG/1
10 TABLET ORAL DAILY
Qty: 180 TABLET | Refills: 1 | Status: SHIPPED | OUTPATIENT
Start: 2021-11-15

## 2021-11-15 RX ORDER — POTASSIUM CHLORIDE 20 MEQ/1
20 TABLET, EXTENDED RELEASE ORAL 3 TIMES DAILY
Qty: 270 TABLET | Refills: 2 | Status: SHIPPED | OUTPATIENT
Start: 2021-11-15

## 2021-11-15 RX ORDER — CLOBETASOL PROPIONATE 0.5 MG/G
CREAM TOPICAL 2 TIMES DAILY
Qty: 60 G | Refills: 3 | Status: SHIPPED | OUTPATIENT
Start: 2021-11-15

## 2021-11-15 RX ORDER — TORSEMIDE 20 MG/1
60 TABLET ORAL DAILY
Qty: 90 TABLET | Refills: 1 | Status: SHIPPED | OUTPATIENT
Start: 2021-11-15 | End: 2022-03-14

## 2021-11-15 RX ORDER — ZOSTER VACCINE LIVE 19400 [PFU]/.65ML
0.65 INJECTION, POWDER, LYOPHILIZED, FOR SUSPENSION SUBCUTANEOUS ONCE
Qty: 0.65 ML | Refills: 0 | Status: SHIPPED | OUTPATIENT
Start: 2021-11-15 | End: 2021-11-15

## 2021-11-15 NOTE — PROGRESS NOTES
Chief Complaint   Patient presents with    Complete Physical     1. Have you been to the ER, urgent care clinic since your last visit? Hospitalized since your last visit? No    2. Have you seen or consulted any other health care providers outside of the [address] [place] [address] since your last visit? Include any pap smears or colon screening. No    Verified patient with two type of identifiers.   denies c/o at present  Declines flu vaccine

## 2021-11-15 NOTE — PATIENT INSTRUCTIONS
Prostate Cancer Screening: Care Instructions  Overview     Prostate cancer is the abnormal growth of cells in the prostate. This is a small organ below the bladder that makes fluid for semen. Screening can help find prostate cancer early. When it's found and treated early, the cancer may be cured. But it's not always treated. That's because the treatments can cause serious side effects. In most cases, prostate cancer isn't life-threatening. This is especially true in someone who is older and when the cancer grows slowly. Prostate cancer is a common type of cancer. Most cases occur after age 72. The disease runs in families. And it's more common in  Americans. Follow-up care is a key part of your treatment and safety. Be sure to make and go to all appointments, and call your doctor if you are having problems. It's also a good idea to know your test results and keep a list of the medicines you take. What is the screening test for prostate cancer? The main screening test for prostate cancer is the prostate-specific antigen (PSA) test. This is a blood test that measures how much PSA is in your blood. A high level may mean that you have an enlarged prostate, an infection, or cancer. Along with the PSA test, you may have a digital (finger) rectal exam. This exam checks for anything abnormal in your prostate. To do the exam, the doctor puts a lubricated, gloved finger into your rectum. If these tests suggest cancer, you may need a prostate biopsy. How is prostate cancer diagnosed? In a biopsy, the doctor takes small tissue samples from your prostate gland. Another doctor then looks at the tissue under a microscope to see if there are cancer cells, signs of infection, or other problems. The results help diagnose prostate cancer. What are the pros and cons of screening? Neither a PSA test nor a digital rectal exam can tell you for sure that you do or do not have cancer.  But they can help you decide if you need more tests, such as a prostate biopsy. Screening tests may be useful because prostate cancer often doesn't cause symptoms. It can be hard to know if you have cancer until it's more advanced. And then it's harder to treat. But having a PSA test can also cause harm. The test may show high levels of PSA that aren't caused by cancer. So you could have a prostate biopsy you didn't need. Or the PSA test might be normal when there is cancer, so a cancer might not be found early. The test can also find cancers that would never have caused a problem during your lifetime. So you might have treatment that wasn't needed. Prostate cancer usually develops late in life and grows slowly. In most cases, it doesn't shorten lives. Some experts advise screening only if you are at high risk. Talk with your doctor to see if screening is right for you. Where can you learn more? Go to http://www.gray.com/  Enter R550 in the search box to learn more about \"Prostate Cancer Screening: Care Instructions. \"  Current as of: December 17, 2020               Content Version: 13.0  © 2006-2021 [employer]. Care instructions adapted under license by [employer] (which disclaims liability or warranty for this information). If you have questions about a medical condition or this instruction, always ask your healthcare professional. [address] any warranty or liability for your use of this information. Colon Cancer Screening: Care Instructions  Your Care Instructions    Colorectal cancer occurs in the colon or rectum. That's the lower part of your digestive system. It is the second-leading cause of cancer deaths in the [place]. It often starts with small growths called polyps in the colon or rectum. Polyps are usually found with screening tests. Depending on the type of test, any polyps found may be removed during the tests.   Colorectal cancer usually does not cause symptoms at first. But regular tests can help find it early, before it spreads and becomes harder to treat. Experts advise routine tests for colon cancer for people starting at age 48. And they advise people with a higher risk of colon cancer to get tested sooner. Talk with your doctor about when you should start testing. Discuss which tests you need. Follow-up care is a key part of your treatment and safety. Be sure to make and go to all appointments, and call your doctor if you are having problems. It's also a good idea to know your test results and keep a list of the medicines you take. What are the main screening tests for colon cancer? · Stool tests. These include the fecal immunochemical test (FIT) and the fecal occult blood test (FOBT). These tests check stool samples for signs of cancer. If your test is positive, you will need to have a colonoscopy. · Sigmoidoscopy. This test lets your doctor look at the lining of your rectum and the lowest part of your colon. Your doctor uses a lighted tube called a sigmoidoscope. This test can't find cancers or polyps in the upper part of your colon. In some cases, polyps that are found can be removed. But if your doctor finds polyps, you will need to have a colonoscopy to check the upper part of your colon. · Colonoscopy. This test lets your doctor look at the lining of your rectum and your entire colon. The doctor uses a thin, flexible tool called a colonoscope. It can also be used to remove polyps or get a tissue sample (biopsy). What tests do you need? The following guidelines are for people age 48 and over who are not at high risk for colorectal cancer. You may have at least one of these tests as directed by your doctor.   · Fecal immunochemical test (FIT) or fecal occult blood test (FOBT) every year  · Sigmoidoscopy every 5 years  · Colonoscopy every 10 years  If you are age 68 to 80, you can work with your doctor to decide if screening is a good option. If you are age 80 or older, your doctor will likely advise that screening is not helpful. Talk with your doctor about when you need to be tested. And discuss which tests are right for you. Your doctor may recommend earlier or more frequent testing if you:  · Have had colorectal cancer before. · Have had colon polyps. · Have symptoms of colorectal cancer. These include blood in your stool and changes in your bowel habits. · Have a parent, brother or sister, or child with colon polyps or colorectal cancer. · Have a bowel disease. This includes ulcerative colitis and Crohn's disease. · Have a rare polyp syndrome that runs in families, such as familial adenomatous polyposis (FAP). · Have had radiation treatments to the belly or pelvis. When should you call for help? Watch closely for changes in your health, and be sure to contact your doctor if:    · You have any changes in your bowel habits.     · You have any problems. Where can you learn more? Go to http://www.gray.com/. Enter M541 in the search box to learn more about \"Colon Cancer Screening: Care Instructions. \"  Current as of: March 28, 2018  Content Version: 11.8  © 9984-8090 [employer]. Care instructions adapted under license by [employer] (which disclaims liability or warranty for this information). If you have questions about a medical condition or this instruction, always ask your healthcare professional. [place] [address] any warranty or liability for your use of this information. Preventing Falls: Care Instructions  Your Care Instructions    Getting around your home safely can be a challenge if you have injuries or health problems that make it easy for you to fall. Loose rugs and furniture in walkways are among the dangers for many older people who have problems walking or who have poor eyesight.  People who have conditions such as arthritis, osteoporosis, or dementia also have to be careful not to fall. You can make your home safer with a few simple measures. Follow-up care is a key part of your treatment and safety. Be sure to make and go to all appointments, and call your doctor if you are having problems. It's also a good idea to know your test results and keep a list of the medicines you take. How can you care for yourself at home? Taking care of yourself  · You may get dizzy if you do not drink enough water. To prevent dehydration, drink plenty of fluids, enough so that your urine is light yellow or clear like water. Choose water and other caffeine-free clear liquids. If you have kidney, heart, or liver disease and have to limit fluids, talk with your doctor before you increase the amount of fluids you drink. · Exercise regularly to improve your strength, muscle tone, and balance. Walk if you can. Swimming may be a good choice if you cannot walk easily. · Have your vision and hearing checked each year or any time you notice a change. If you have trouble seeing and hearing, you might not be able to avoid objects and could lose your balance. · Know the side effects of the medicines you take. Ask your doctor or pharmacist whether the medicines you take can affect your balance. Sleeping pills or sedatives can affect your balance. · Limit the amount of alcohol you drink. Alcohol can impair your balance and other senses. · Ask your doctor whether calluses or corns on your feet need to be removed. If you wear loose-fitting shoes because of calluses or corns, you can lose your balance and fall. · Talk to your doctor if you have numbness in your feet. Preventing falls at home  · Remove raised doorway thresholds, throw rugs, and clutter. Repair loose carpet or raised areas in the floor. · Move furniture and electrical cords to keep them out of walking paths.   · Use nonskid floor wax, and wipe up spills right away, especially on ceramic tile floors. · If you use a walker or cane, put rubber tips on it. If you use crutches, clean the bottoms of them regularly with an abrasive pad, such as steel wool. · Keep your house well lit, especially [place], and outside walkways. Use night-lights in areas such as hallways and bathrooms. Add extra light switches or use remote switches (such as switches that go on or off when you clap your hands) to make it easier to turn lights on if you have to get up during the night. · Install sturdy handrails on stairways. · Move items in your cabinets so that the things you use a lot are on the lower shelves (about waist level). · Keep a cordless phone and a flashlight with new batteries by your bed. If possible, put a phone in each of the main rooms of your house, or carry a cell phone in case you fall and cannot reach a phone. Or, you can wear a device around your neck or wrist. You push a button that sends a signal for help. · Wear low-heeled shoes that fit well and give your feet good support. Use footwear with nonskid soles. Check the heels and soles of your shoes for wear. Repair or replace worn heels or soles. · Do not wear socks without shoes on wood floors. · Walk on the grass when the sidewalks are slippery. If you live in an area that gets snow and ice in the winter, sprinkle salt on slippery steps and sidewalks. Preventing falls in the bath  · Install grab bars and nonskid mats inside and outside your shower or tub and near the toilet and sinks. · Use shower chairs and bath benches. · Use a hand-held shower head that will allow you to sit while showering. · Get into a tub or shower by putting the weaker leg in first. Get out of a tub or shower with your strong side first.  · Repair loose toilet seats and consider installing a raised toilet seat to make getting on and off the toilet easier. · Keep your bathroom door unlocked while you are in the shower. Where can you learn more?   Go to http://www.gray.com/. Enter [id number] [id number] in the search box to learn more about \"Preventing Falls: Care Instructions. \"  Current as of: March 16, 2018  Content Version: 11.8  © 4541-0802 [employer]. Care instructions adapted under license by [employer] (which disclaims liability or warranty for this information). If you have questions about a medical condition or this instruction, always ask your healthcare professional. [place] [address] any warranty or liability for your use of this information.

## 2021-11-15 NOTE — PROGRESS NOTES
Subjective:   Rosalinda Hester is a 46 y.o. male presenting for his annual checkup. ROS:  Feeling well. No dyspnea or chest pain on exertion. No abdominal pain, change in bowel habits, black or bloody stools. No urinary tract or prostatic symptoms. No neurological complaints. Specific concerns today:  today patient present for f/u regarding the diabetic state, who has been compliancy w/ meds, and is trying to have a diabetic diet, and patient has also been more active , does a home monitoring glucoseusually averaging around 130 , +++ Rf needed for today, patient currently denies tingling sensation, has no polyurea and polydipsia, last a1c was not at target of 8.% %, Last urine microalbumin 2021 and was normal, patient currently on ACE inhibitor. Current Outpatient Medications   Medication Sig Dispense Refill    metOLazone (ZAROXOLYN) 5 mg tablet Take 2 Tablets by mouth daily. 180 Tablet 1    potassium chloride (K-DUR, KLOR-CON) 20 mEq tablet Take 1 Tablet by mouth three (3) times daily. Indications: low amount of potassium in the blood 270 Tablet 2    clobetasoL (TEMOVATE) 0.05 % topical cream Apply  to affected area two (2) times a day. 60 g 3    torsemide (DEMADEX) 20 mg tablet Take 3 Tablets by mouth daily. 90 Tablet 1    Glyxambi 25-5 mg tab Take 1 tablet by mouth once daily 90 Tablet 0    albuterol (ProAir HFA) 90 mcg/actuation inhaler Take 1 Puff by inhalation every six (6) hours as needed for Wheezing or Shortness of Breath. 1 Inhaler 11    amLODIPine-benazepril (LOTREL) 10-40 mg per capsule Take 1 Cap by mouth daily. 30 Cap 5    ergocalciferol (ERGOCALCIFEROL) 1,250 mcg (50,000 unit) capsule Take 1 capsule by mouth once a week 12 Cap 0    metFORMIN (GLUCOPHAGE) 1,000 mg tablet TAKE ONE TABLET BY MOUTH TWICE DAILY WITH MEALS 180 Tab 4    chlorthalidone (HYGROTEN) 25 mg tablet Take 1 Tab by mouth daily.  90 Tab 4    gabapentin (NEURONTIN) 100 mg capsule Take 1 Cap by mouth three (3) times daily. Max Daily Amount: 300 mg. (Patient taking differently: Take 100 mg by mouth three (3) times daily. As needed) 60 Cap 0    glucose blood VI test strips (ASCENSIA AUTODISC VI, ONE TOUCH ULTRA TEST VI) strip by Does Not Apply route. As directed 1 Package 11    glipiZIDE (GLUCOTROL) 10 mg tablet TAKE 1 & 1/2 (ONE & ONE-HALF) TABLETS BY MOUTH TWICE DAILY 270 Tablet 4    ketoconazole (NIZORAL) 2 % topical cream APPLY TO THE AFFECTED AREA(S) TWICE DAILY (Patient taking differently: As needed) 60 g 0    orlistat (XENICAL) 120 mg capsule Take 120 mg by mouth three (3) times daily (with meals). (Patient not taking: Reported on 11/15/2021) 90 Cap 6    diclofenac (VOLTAREN) 1 % gel Apply 2 g to affected area four (4) times daily. (Patient taking differently: Apply 2 g to affected area as needed.) 100 g 2    aspirin 81 mg tablet Take 1 Tab by mouth daily. (Patient not taking: Reported on 11/15/2021) 30 Tab 11    Lancets Misc by Does Not Apply route. As directed   1 Package 11     Allergies   Allergen Reactions    Statins-Hmg-Coa Reductase Inhibitors Myalgia    Wheat Hives     Past Medical History:   Diagnosis Date    Acute pain of left knee 8/21/2017    Acute pain of left knee 8/21/2017    Diabetes mellitus type II, uncontrolled ([address] 8/8/2014    Diabetic polyneuropathy associated with type 2 diabetes mellitus ([address] 11/22/2016    DM [facility]diabetes mellitus) [facility] 6/15/2010    GERD (gastroesophageal reflux disease) 2/21/2014    Hypercholesterolemia 6/15/2010    Hypertension     Prostate infarction 3/28/2016    Screening for depression 3/30/2014    Tinea cruris 3/13/2014    Urinary frequency 3/28/2016     No past surgical history on file. Family History   Problem Relation Age of Onset    Lung Disease Mother     Hypertension Father     Heart Disease Father      Social History     Tobacco Use    Smoking status: Never Smoker    Smokeless tobacco: Never Used   Substance Use Topics    Alcohol use:  No Lab Results   Component Value Date/Time    Hemoglobin A1c 9.1 (H) 06/18/2018 11:35 AM    Hemoglobin A1c 8.6 (H) 08/08/2014 12:18 PM    Hemoglobin A1c 8.6 (H) 02/21/2014 10:04 AM    Glucose 121 (H) 11/15/2021 11:01 AM    Glucose (POC) 292 (H) 06/15/2010 05:14 AM    Glucose POC 205mg/dl 06/15/2010 03:13 PM    Microalbumin/Creat ratio (mg/g creat) 616 (H) 11/15/2021 11:01 AM    Microalbumin,urine random 56.50 11/15/2021 11:01 AM    LDL, calculated 88.2 11/15/2021 11:01 AM    Creatinine 1.36 (H) 11/15/2021 11:01 AM      Lab Results   Component Value Date/Time    Cholesterol, total 155 11/15/2021 11:01 AM    HDL Cholesterol 50 11/15/2021 11:01 AM    LDL, calculated 88.2 11/15/2021 11:01 AM    Triglyceride 84 11/15/2021 11:01 AM    CHOL/HDL Ratio 3.1 11/15/2021 11:01 AM        Objective:     Visit Vitals  BP (!) 146/76   Pulse (!) 54   Resp 18   Ht 6' (1.829 m)   Wt (!) 388 lb 14.4 oz (176.4 kg)   SpO2 97%   BMI 52.74 kg/m²     The patient appears well, alert, oriented x 3, in no distress. ENT normal.  Neck supple. No adenopathy or thyromegaly. NAYAN. Lungs are clear, good air entry, no wheezes, rhonchi or rales. S1 and S2 normal, no murmurs, regular rate and rhythm. Abdomen is soft without tenderness, guarding, mass or organomegaly.  exam: no penile lesions or discharge, no testicular masses or tenderness, no hernias. Extremities show no edema, normal peripheral pulses. Neurological is normal without focal findings. Assessment/Plan:   healthy adult male  lose weight, increase physical activity, follow low fat diet, follow low salt diet, continue present plan, routine labs ordered. Diagnoses and all orders for this visit:    1. Type 2 diabetes with nephropathy (HCC)  -     AMB POC HEMOGLOBIN A1C  -     REFERRAL TO OPTOMETRY  -      DIABETES FOOT EXAM  -     MICROALBUMIN, UR, RAND W/ MICROALB/CREAT RATIO; Future  -     LIPID PANEL; Future  -     CBC WITH AUTOMATED DIFF;  Future  -     METABOLIC PANEL, COMPREHENSIVE; Future  -     MICROALBUMIN, UR, RAND W/ MICROALB/CREAT RATIO; Future  -     TSH 3RD GENERATION; Future  -     URINALYSIS W/ RFLX MICROSCOPIC; Future  -     REFERRAL TO DERMATOLOGY  -     REFERRAL TO GASTROENTEROLOGY    2. Routine medical exam  -     REFERRAL TO DERMATOLOGY  -     REFERRAL TO GASTROENTEROLOGY    3. Screen for colon cancer  -     REFERRAL TO DERMATOLOGY  -     REFERRAL TO GASTROENTEROLOGY    4. Prostate cancer screening  -     PSA, DIAGNOSTIC (PROSTATE SPECIFIC AG); Future  -     REFERRAL TO DERMATOLOGY  -     REFERRAL TO GASTROENTEROLOGY    5. Mixed hyperlipidemia  -     LIPID PANEL; Future  -     REFERRAL TO DERMATOLOGY  -     REFERRAL TO GASTROENTEROLOGY    6. Encounter for immunization  -     varicella-zoster recombinant, PF, (SHINGRIX) 50 mcg/0.5 mL susr injection; 0.5 mL by IntraMUSCular route once for 1 dose.  -     REFERRAL TO DERMATOLOGY  -     REFERRAL TO GASTROENTEROLOGY    7. Laboratory exam ordered as part of routine general medical examination  -     HEPATITIS PANEL, ACUTE; Future  -     MICROALBUMIN, UR, RAND W/ MICROALB/CREAT RATIO; Future  -     LIPID PANEL; Future  -     CBC WITH AUTOMATED DIFF; Future  -     METABOLIC PANEL, COMPREHENSIVE; Future  -     MICROALBUMIN, UR, RAND W/ MICROALB/CREAT RATIO; Future  -     TSH 3RD GENERATION; Future  -     URINALYSIS W/ RFLX MICROSCOPIC; Future  -     REFERRAL TO DERMATOLOGY  -     REFERRAL TO GASTROENTEROLOGY    8. Bilateral edema of lower extremity  -     potassium chloride (K-DUR, KLOR-CON) 20 mEq tablet; Take 1 Tablet by mouth three (3) times daily. Indications: low amount of potassium in the blood  -     REFERRAL TO DERMATOLOGY  -     REFERRAL TO GASTROENTEROLOGY    Other orders  -     varicella zoster vaccine live (VARICELLA-ZOSTER VACINE LIVE) 19,400 unit/0.65 mL susr injection; 1 Vial by SubCUTAneous route once for 1 dose. -     metOLazone (ZAROXOLYN) 5 mg tablet; Take 2 Tablets by mouth daily.   -     clobetasoL (TEMOVATE) 0.05 % topical cream; Apply  to affected area two (2) times a day. -     torsemide (DEMADEX) 20 mg tablet; Take 3 Tablets by mouth daily.  -     SAMPLES BEING HELD      Follow-up and Dispositions    · Return in about 3 months (around 2/15/2022), or if symptoms worsen or fail to improve. Amos Rodriguez

## 2021-11-19 RX ORDER — GLIPIZIDE 10 MG/1
TABLET ORAL
Qty: 270 TABLET | Refills: 4 | Status: SHIPPED | OUTPATIENT
Start: 2021-11-19

## 2021-11-26 RX ORDER — CHLORTHALIDONE 25 MG/1
TABLET ORAL
Qty: 90 TABLET | Refills: 0 | Status: SHIPPED | OUTPATIENT
Start: 2021-11-26 | End: 2022-03-14

## 2021-12-27 DIAGNOSIS — E55.9 VITAMIN D DEFICIENCY: ICD-10-CM

## 2021-12-27 DIAGNOSIS — E03.8 TSH (THYROID-STIMULATING HORMONE DEFICIENCY): ICD-10-CM

## 2021-12-27 DIAGNOSIS — E78.2 MIXED HYPERLIPIDEMIA: ICD-10-CM

## 2021-12-27 DIAGNOSIS — I10 ESSENTIAL HYPERTENSION: ICD-10-CM

## 2021-12-31 RX ORDER — ERGOCALCIFEROL 1.25 MG/1
50000 CAPSULE ORAL
Qty: 12 CAPSULE | Refills: 3 | Status: SHIPPED | OUTPATIENT
Start: 2021-12-31

## 2022-01-03 ENCOUNTER — VIRTUAL VISIT (OUTPATIENT)
Dept: FAMILY MEDICINE CLINIC | Age: 52
End: 2022-01-03
Payer: COMMERCIAL

## 2022-01-03 DIAGNOSIS — J20.9 ACUTE BRONCHITIS, UNSPECIFIED ORGANISM: ICD-10-CM

## 2022-01-03 DIAGNOSIS — Z71.89 ADVICE GIVEN ABOUT COVID-19 VIRUS INFECTION: ICD-10-CM

## 2022-01-03 DIAGNOSIS — U07.1 COVID-19 VIRUS RNA TEST RESULT POSITIVE AT LIMIT OF DETECTION: Primary | ICD-10-CM

## 2022-01-03 PROCEDURE — 99213 OFFICE O/P EST LOW 20 MIN: CPT | Performed by: FAMILY MEDICINE

## 2022-01-03 RX ORDER — GUAIFENESIN 600 MG/1
600 TABLET, EXTENDED RELEASE ORAL 2 TIMES DAILY
Qty: 20 TABLET | Refills: 0
Start: 2022-01-03

## 2022-01-03 RX ORDER — DOXYCYCLINE 100 MG/1
100 CAPSULE ORAL 2 TIMES DAILY
Qty: 20 CAPSULE | Refills: 0 | Status: SHIPPED | OUTPATIENT
Start: 2022-01-03 | End: 2022-01-13

## 2022-01-03 NOTE — PROGRESS NOTES
HISTORY OF PRESENT ILLNESS  Vandana Ayala is a 46 y.o. male. Today's Pt main concerns were provided on virtual visit and Video telemed format,  Present on VV for the concern of the current medical conditions,  pt is w/ comorbid history and  the pt has been exposed to covid-19 individual, and has been diagnose with ++test resultsPt Have been staying at home for couple of days pt has some low grade fever with dry cough no dyspnea, no ha, not dizzy, nl smell nl taste, no N/V/D, no body ache. over all stating that things are getting better have a good family support at this time, who brings food for the pt got it from his kid has been off work, it has been 12-13 days,   Was told to have viral Pneumonia  Was givne z pack, albuterol, body ache, +dry cough,     Current Outpatient Medications   Medication Sig Dispense Refill    ergocalciferol (ERGOCALCIFEROL) 1,250 mcg (50,000 unit) capsule Take 1 Capsule by mouth every seven (7) days. 12 Capsule 3    chlorthalidone (HYGROTON) 25 mg tablet Take 1 tablet by mouth once daily 90 Tablet 0    glipiZIDE (GLUCOTROL) 10 mg tablet TAKE 1 & 1/2 (ONE & ONE-HALF) TABLETS BY MOUTH TWICE DAILY 270 Tablet 4    metOLazone (ZAROXOLYN) 5 mg tablet Take 2 Tablets by mouth daily. 180 Tablet 1    potassium chloride (K-DUR, KLOR-CON) 20 mEq tablet Take 1 Tablet by mouth three (3) times daily. Indications: low amount of potassium in the blood 270 Tablet 2    clobetasoL (TEMOVATE) 0.05 % topical cream Apply  to affected area two (2) times a day. 60 g 3    torsemide (DEMADEX) 20 mg tablet Take 3 Tablets by mouth daily. 90 Tablet 1    Glyxambi 25-5 mg tab Take 1 tablet by mouth once daily 90 Tablet 0    albuterol (ProAir HFA) 90 mcg/actuation inhaler Take 1 Puff by inhalation every six (6) hours as needed for Wheezing or Shortness of Breath. 1 Inhaler 11    amLODIPine-benazepril (LOTREL) 10-40 mg per capsule Take 1 Cap by mouth daily.  30 Cap 5    metFORMIN (GLUCOPHAGE) 1,000 mg tablet TAKE ONE TABLET BY MOUTH TWICE DAILY WITH MEALS 180 Tab 4    glucose blood VI test strips (ASCENSIA AUTODISC VI, ONE TOUCH ULTRA TEST VI) strip by Does Not Apply route. As directed 1 Package 11    Lancets Misc by Does Not Apply route. As directed   1 Package 11    ketoconazole (NIZORAL) 2 % topical cream APPLY TO THE AFFECTED AREA(S) TWICE DAILY (Patient taking differently: As needed) 60 g 0    gabapentin (NEURONTIN) 100 mg capsule Take 1 Cap by mouth three (3) times daily. Max Daily Amount: 300 mg. (Patient taking differently: Take 100 mg by mouth three (3) times daily. As needed) 60 Cap 0    orlistat (XENICAL) 120 mg capsule Take 120 mg by mouth three (3) times daily (with meals). (Patient not taking: Reported on 11/15/2021) 90 Cap 6    diclofenac (VOLTAREN) 1 % gel Apply 2 g to affected area four (4) times daily. (Patient not taking: Reported on 1/3/2022) 100 g 2    aspirin 81 mg tablet Take 1 Tab by mouth daily.  (Patient not taking: Reported on 11/15/2021) 30 Tab 11     Allergies   Allergen Reactions    Statins-Hmg-Coa Reductase Inhibitors Myalgia    Wheat Hives     Past Medical History:   Diagnosis Date    Acute pain of left knee 8/21/2017    Acute pain of left knee 8/21/2017    Diabetes mellitus type II, uncontrolled ([address] 8/8/2014    Diabetic polyneuropathy associated with type 2 diabetes mellitus ([address] 11/22/2016    DM (diabetes mellitus) ([facility] 6/15/2010    GERD (gastroesophageal reflux disease) 2/21/2014    Hypercholesterolemia 6/15/2010    Hypertension     Prostate infarction 3/28/2016    Screening for depression 3/30/2014    Tinea cruris 3/13/2014    Urinary frequency 3/28/2016     Family History   Problem Relation Age of Onset    Lung Disease Mother     Hypertension Father     Heart Disease Father      Social History     Tobacco Use    Smoking status: Never Smoker    Smokeless tobacco: Never Used   Substance Use Topics    Alcohol use: No      Lab Results   Component Value Date/Time    Hemoglobin A1c 9.1 (H) 06/18/2018 11:35 AM    Hemoglobin A1c 8.6 (H) 08/08/2014 12:18 PM    Hemoglobin A1c 8.6 (H) 02/21/2014 10:04 AM    Glucose 121 (H) 11/15/2021 11:01 AM    Glucose (POC) 292 (H) 06/15/2010 05:14 AM    Glucose POC 205mg/dl 06/15/2010 03:13 PM    Microalbumin/Creat ratio (mg/g creat) 616 (H) 11/15/2021 11:01 AM    Microalbumin,urine random 56.50 11/15/2021 11:01 AM    LDL, calculated 88.2 11/15/2021 11:01 AM    Creatinine 1.36 (H) 11/15/2021 11:01 AM      Lab Results   Component Value Date/Time    Cholesterol, total 155 11/15/2021 11:01 AM    HDL Cholesterol 50 11/15/2021 11:01 AM    LDL, calculated 88.2 11/15/2021 11:01 AM    Triglyceride 84 11/15/2021 11:01 AM    CHOL/HDL Ratio 3.1 11/15/2021 11:01 AM        Review of Systems   Constitutional: Negative for chills and fever. HENT: Negative for congestion and nosebleeds. Eyes: Negative for blurred vision and pain. Respiratory: Negative for cough, shortness of breath and wheezing. Cardiovascular: Negative for chest pain and leg swelling. Gastrointestinal: Negative for constipation, diarrhea, nausea and vomiting. Genitourinary: Negative for dysuria and frequency. Musculoskeletal: Negative for joint pain and myalgias. Skin: Negative for itching and rash. Neurological: Negative for dizziness, loss of consciousness and headaches. Psychiatric/Behavioral: Negative for depression. The patient is not nervous/anxious and does not have insomnia. Physical Exam  Constitutional:       Appearance: Normal appearance. HENT:      Head: Normocephalic and atraumatic. Nose: Nose normal. No congestion. Neurological:      Mental Status: He is alert and oriented to person, place, and time. Psychiatric:         Mood and Affect: Mood normal.         Behavior: Behavior normal.         Thought Content:  Thought content normal.         Judgment: Judgment normal.         ASSESSMENT and PLAN  Diagnoses and all orders for this visit: 1. COVID-19 virus RNA test result positive at limit of detection  -     doxycycline (VIBRAMYCIN) 100 mg capsule; Take 1 Capsule by mouth two (2) times a day for 10 days.  -     guaiFENesin ER (MUCINEX) 600 mg ER tablet; Take 1 Tablet by mouth two (2) times a day. 2. Acute bronchitis, unspecified organism  -     doxycycline (VIBRAMYCIN) 100 mg capsule; Take 1 Capsule by mouth two (2) times a day for 10 days.  -     guaiFENesin ER (MUCINEX) 600 mg ER tablet; Take 1 Tablet by mouth two (2) times a day. 3. Advice given about COVID-19 virus infection  -     doxycycline (VIBRAMYCIN) 100 mg capsule; Take 1 Capsule by mouth two (2) times a day for 10 days.  -     guaiFENesin ER (MUCINEX) 600 mg ER tablet; Take 1 Tablet by mouth two (2) times a day. Patient was told to get an nonrapid testing before he goes to work  Pt was told that currently need to be in isolation 10 to 14 days and,there is no antiviral medication to treat COVID-19.  Current treatment is aimed to relieve sxs such as pain relievers no ibuprofen but mostly acetaminophen, anti Cough medication , plenty of Rest and a lot of oral hydration, Isolation and Contact tracing at this time, was told to cont temp monitoring and have tylenol ES available to bring the temp down, observe for worsening condition such as Dyspnea and difficulty breathing for which needs to call 911 ASAP, pt agreed

## 2022-01-03 NOTE — PROGRESS NOTES
Chief Complaint   Patient presents with    Concern For [facility] (Coronavirus)     1. Have you been to the ER, urgent care clinic since your last visit? Hospitalized since your last visit? Yes When: 12/21/21 Where: [facility]  Reason for visit: congestion    2. Have you seen or consulted any other health care providers outside of the [address] [place] [address] since your last visit? Include any pap smears or colon screening. No      Call placed to pt. Verified patient with two type of identifiers. seen at [facility] on  For congestion and flu like symptoms,  Dx with covid pneumonia,  Still having right sided pain/ribs.  , cough and congestion  Blood sugar 130

## 2022-01-03 NOTE — PROGRESS NOTES
HISTORY OF PRESENT ILLNESS Soco Eldridge is a 46 y.o. male. HPI  
DMtype II Compliant w/ meds, his meds were not changed but he was given a home remedies and he has been compliant with his diabetic diet, and does a lot of walking and obtains home glucose monitoring averaging 140-150-160's  . No Rf needed for today. Denies any tingling sensation, polyurea and polydipsia, last a1c was not at target 9% and today is at 8.2%    Last urine microalbumin 2018 and was abnormal . Feeling better since the last visit. Current Outpatient Prescriptions Medication Sig Dispense Refill  empagliflozin-linagliptin (GLYXAMBI) 25-5 mg tab Take 1 Tab by mouth daily. 90 Tab 6  
 ketoconazole (NIZORAL) 2 % topical cream APPLY TOPICALLY TO AFFECTED AREA 2 TIMES DAILY 60 oz 6  Olmesartan-amLODIPine-HCTZ (TRIBENZOR) 40-10-25 mg tab TAKE ONE TABLET BY MOUTH EVERY DAY 30 Tab 0  
 ergocalciferol (ERGOCALCIFEROL) 50,000 unit capsule TAKE 1 CAPSULE BY MOUTH EVERY 7 DAYS  Indications: hypoparathyroidism (Patient not taking: Reported on 6/18/2018) 12 Cap 6  
 glipiZIDE (GLUCOTROL) 10 mg tablet TAKE ONE & ONE-HALF TABLETS BY MOUTH TWICE DAILY 270 Tab 4  
 metOLazone (ZAROXOLYN) 5 mg tablet TAKE ONE TABLET BY MOUTH ONCE DAILY 90 Tab 3  
 metFORMIN (GLUCOPHAGE) 1,000 mg tablet TAKE ONE TABLET BY MOUTH TWICE DAILY WITH MEALS 180 Tab 4  potassium chloride (K-DUR, KLOR-CON) 20 mEq tablet Take 1 Tab by mouth three (3) times daily. Indications: hypokalemia 270 Tab 2  
 diclofenac (VOLTAREN) 1 % gel Apply 2 g to affected area four (4) times daily. 100 g 2  
 albuterol (PROAIR HFA) 90 mcg/actuation inhaler Take 1 Puff by inhalation every six (6) hours as needed for Wheezing or Shortness of Breath. 1 Inhaler 11  
 betamethasone dipropionate (DIPROSONE) 0.05 % topical cream Apply  to affected area two (2) times a day. (Patient not taking: Reported on 6/18/2018) 15 g 0  
 aspirin 81 mg tablet Take 1 Tab by mouth daily.  30 Tab 11  
  glucose blood VI test strips (ASCENSIA AUTODISC VI, ONE TOUCH ULTRA TEST VI) strip by Does Not Apply route. As directed 1 Package 11  Lancets Misc by Does Not Apply route. As directed 1 Package 11 Allergies Allergen Reactions  Wheat Hives Past Medical History:  
Diagnosis Date  Acute pain of left knee 8/21/2017  Acute pain of left knee 8/21/2017  Diabetes mellitus type II, uncontrolled ([facility] 8/8/2014  Diabetic polyneuropathy associated with type 2 diabetes mellitus ([facility] 11/22/2016  DM (diabetes mellitus) ([facility] 6/15/2010  GERD (gastroesophageal reflux disease) 2/21/2014  Hypercholesterolemia 6/15/2010  Hypertension  Prostate infarction 3/28/2016  Screening for depression 3/30/2014  Tinea cruris 3/13/2014  Urinary frequency 3/28/2016 No past surgical history on file. Family History Problem Relation Age of Onset  Lung Disease Mother  Hypertension Father  Heart Disease Father Social History Substance Use Topics  Smoking status: Never Smoker  Smokeless tobacco: Never Used  Alcohol use No  
  
Lab Results Component Value Date/Time WBC 10.0 06/18/2018 11:35 AM  
HGB 15.3 06/18/2018 11:35 AM  
HCT 46.6 06/18/2018 11:35 AM  
PLATELET 997 19/95/4584 11:35 AM  
MCV 82 06/18/2018 11:35 AM  
 
Lab Results Component Value Date/Time GFR est non-AA 64 06/18/2018 11:35 AM  
GFR est AA 74 06/18/2018 11:35 AM  
Creatinine 1.30 (H) 06/18/2018 11:35 AM  
BUN 13 06/18/2018 11:35 AM  
Sodium 140 06/18/2018 11:35 AM  
Potassium 4.1 06/18/2018 11:35 AM  
Chloride 95 (L) 06/18/2018 11:35 AM  
CO2 28 06/18/2018 11:35 AM  
Magnesium 1.8 06/14/2010 04:45 AM  
  
 
Review of Systems Constitutional: Negative for chills and fever. HENT: Negative for congestion and nosebleeds. Eyes: Negative for blurred vision and pain. Respiratory: Negative for cough, shortness of breath and wheezing. Cardiovascular: Negative for chest pain and leg swelling. Gastrointestinal: Negative for constipation, diarrhea, nausea and vomiting. Genitourinary: Negative for dysuria and frequency. Musculoskeletal: Negative for joint pain and myalgias. Skin: Negative for itching and rash. Neurological: Negative for dizziness, loss of consciousness and headaches. Psychiatric/Behavioral: Negative for depression. The patient is not nervous/anxious and does not have insomnia. Physical Exam  
Constitutional: He is oriented to person, place, and time. He appears well-developed and well-nourished. HENT:  
Head: Normocephalic and atraumatic. Mouth/Throat: No oropharyngeal exudate. Eyes: Conjunctivae and EOM are normal. Pupils are equal, round, and reactive to light. Neck: Normal range of motion. Neck supple. No JVD present. No thyromegaly present. Cardiovascular: Normal rate, regular rhythm, normal heart sounds and intact distal pulses. Exam reveals no friction rub. No murmur heard. Pulmonary/Chest: Effort normal and breath sounds normal. No respiratory distress. He has no wheezes. He has no rales. Abdominal: Soft. Bowel sounds are normal. He exhibits no distension. There is no tenderness. Musculoskeletal: He exhibits no edema or tenderness. Lymphadenopathy:  
  He has no cervical adenopathy. Neurological: He is alert and oriented to person, place, and time. He has normal reflexes. Skin: Skin is warm. No rash noted. No erythema. Psychiatric: He has a normal mood and affect. His behavior is normal.  
Nursing note and vitals reviewed. ASSESSMENT and PLAN Diagnoses and all orders for this visit: 
 
1. Uncontrolled type 2 diabetes mellitus without complication, without long-term current use of insulin ([address] -     AMB POC HEMOGLOBIN A1C 
 
2.  Encounter for immunization 
-     varicella-zoster recombinant, PF, (SHINGRIX) 50 mcg/0.5 mL susr injection; 0.5 mL by IntraMUSCular route once for 1 dose. 3. Screen for colon cancer -     OCCULT BLOOD IMMUNOASSAY,DIAGNOSTIC 
 
pt responded well to the home remedies will cont for another 3 months if a1c still up then pt has few other choices but he was told to  
  be compliant with the following steps for improving diabetic care and outcome for further care to prevent further devastating complications of a uncontrolled diabetic state: increase Diabetic Education by more readings, have a great diabetic diet , low cholesterol diet, weight control and daily exercise 20- 30 min most days of the week, home glucose monitoring if possible, and daily foot care and yearly foot  Doctor  and  annual eye examinations at [facility],  will cont with your average sugar reading check every 3months. [Well Developed] : well developed [Well Nourished] : well nourished [No Acute Distress] : no acute distress [Normal Conjunctiva] : normal conjunctiva [Normal Venous Pressure] : normal venous pressure [No Carotid Bruit] : no carotid bruit [Normal S1, S2] : normal S1, S2 [No Murmur] : no murmur [No Rub] : no rub [No Gallop] : no gallop [Clear Lung Fields] : clear lung fields [Good Air Entry] : good air entry [No Respiratory Distress] : no respiratory distress  [Soft] : abdomen soft [Non Tender] : non-tender [No Masses/organomegaly] : no masses/organomegaly [Normal Bowel Sounds] : normal bowel sounds [Normal Gait] : normal gait [No Edema] : no edema [No Cyanosis] : no cyanosis [No Clubbing] : no clubbing [No Varicosities] : no varicosities [No Rash] : no rash [No Skin Lesions] : no skin lesions [Moves all extremities] : moves all extremities [No Focal Deficits] : no focal deficits [Normal Speech] : normal speech [Alert and Oriented] : alert and oriented [Normal memory] : normal memory

## 2022-02-03 RX ORDER — EMPAGLIFLOZIN AND LINAGLIPTIN 25; 5 MG/1; MG/1
1 TABLET, FILM COATED ORAL DAILY
Qty: 90 TABLET | Refills: 0 | Status: CANCELLED | OUTPATIENT
Start: 2022-02-03

## 2022-02-04 RX ORDER — EMPAGLIFLOZIN AND LINAGLIPTIN 25; 5 MG/1; MG/1
TABLET, FILM COATED ORAL
Qty: 90 TABLET | Refills: 0 | Status: SHIPPED | OUTPATIENT
Start: 2022-02-04 | End: 2022-05-23 | Stop reason: SDUPTHER

## 2022-02-21 ENCOUNTER — OFFICE VISIT (OUTPATIENT)
Dept: FAMILY MEDICINE CLINIC | Age: 52
End: 2022-02-21
Payer: COMMERCIAL

## 2022-02-21 VITALS
BODY MASS INDEX: 42.66 KG/M2 | HEART RATE: 63 BPM | HEIGHT: 72 IN | SYSTOLIC BLOOD PRESSURE: 122 MMHG | DIASTOLIC BLOOD PRESSURE: 76 MMHG | OXYGEN SATURATION: 96 % | TEMPERATURE: 98.5 F | WEIGHT: 315 LBS | RESPIRATION RATE: 15 BRPM

## 2022-02-21 DIAGNOSIS — E11.42 DIABETIC POLYNEUROPATHY ASSOCIATED WITH TYPE 2 DIABETES MELLITUS (HCC): ICD-10-CM

## 2022-02-21 DIAGNOSIS — M10.9 GOUT, UNSPECIFIED CAUSE, UNSPECIFIED CHRONICITY, UNSPECIFIED SITE: ICD-10-CM

## 2022-02-21 DIAGNOSIS — E11.21 TYPE 2 DIABETES WITH NEPHROPATHY (HCC): Primary | ICD-10-CM

## 2022-02-21 DIAGNOSIS — M25.531 WRIST PAIN, ACUTE, RIGHT: ICD-10-CM

## 2022-02-21 LAB — HBA1C MFR BLD HPLC: 10.1 %

## 2022-02-21 PROCEDURE — 99214 OFFICE O/P EST MOD 30 MIN: CPT | Performed by: FAMILY MEDICINE

## 2022-02-21 PROCEDURE — 83036 HEMOGLOBIN GLYCOSYLATED A1C: CPT | Performed by: FAMILY MEDICINE

## 2022-02-21 RX ORDER — LANOLIN ALCOHOL/MO/W.PET/CERES
1000 CREAM (GRAM) TOPICAL DAILY
COMMUNITY

## 2022-02-21 RX ORDER — FLASH GLUCOSE SENSOR
KIT MISCELLANEOUS
Qty: 1 KIT | Refills: 3 | Status: SHIPPED | OUTPATIENT
Start: 2022-02-21

## 2022-02-21 RX ORDER — BETAMETHASONE DIPROPIONATE 0.5 MG/G
CREAM TOPICAL 2 TIMES DAILY
Qty: 45 G | Refills: 5 | Status: SHIPPED | OUTPATIENT
Start: 2022-02-21

## 2022-02-21 RX ORDER — GABAPENTIN 100 MG/1
100 CAPSULE ORAL 3 TIMES DAILY
Qty: 90 CAPSULE | Refills: 0 | Status: SHIPPED | OUTPATIENT
Start: 2022-02-21 | End: 2022-06-10 | Stop reason: SDUPTHER

## 2022-02-21 NOTE — PROGRESS NOTES
HISTORY OF PRESENT ILLNESS  Santos Rodriguez is a 46 y.o. male. Today's Covid vaccinated Pt main concerns were provided in the clinic,    pt is w/ comorbid history and   Pt has been trying to wear mask most of the times,  pt has no fever no cough no dyspnea,        today patient present for f/u regarding the diabetic state, who has been compliancy w/ meds, and is trying to have a diabetic diet, and patient has also been more active , does a home monitoring glucoseusually averaging around 130 , +++ Rf needed for today, patient currently denies tingling sensation, has no polyurea and polydipsia, last a1c was not at target of 8.% %, Last urine microalbumin 2021 and was normal, patient currently on ACE inhibitor. 380 lb 11.2 oz (172.7 kg) Abnormal   as of 2/21/2022 380 lb 11.2 oz (172.7 kg) Abnormal   388 lb 14.4 oz (176.4 kg) Abnormal   392 lb 8 oz      Rash of the neck  Started few weeks ago not better tried alcohol washing and OTC antibiotic ointments, dosenot tingles and not pain full, states that is notexpanding red, and not  swelled up, whitish patches rounds, with itchiness      Wrist Pain  both sided unale to do his job fully, Today patient complains of paresthesia, patient has the sensation of the pins and needles, in addition,  the patient also states that the ext/limb falls asleep and sometimes patient has the sensation of bugs crawling underneath the skin patient denies any herpetic lesion in the past, and has not done any nerve study, gabapentin has been helping the patient greatly with the pain and tingling sensation unfortunately recently patient ran out of the medication and since then the pain and tingling sensation is worsening without the current medication is 7 out of 10 radiating down to bilateral toes and hands and the condition not getting better without the medication  he has tried arthritis medications and OTC ointments for the symptoms. The treatment provided no relief.  There has been no history of extremity trauma (but alot of office work/typing). no phx of ++ Family history for  gout. Current Outpatient Medications   Medication Sig Dispense Refill    cyanocobalamin 1,000 mcg tablet Take 1,000 mcg by mouth daily. 10 Mg      MAGNESIUM PO Take  by mouth.  Glyxambi 25-5 mg tab Take 1 tablet by mouth once daily 90 Tablet 0    guaiFENesin ER (MUCINEX) 600 mg ER tablet Take 1 Tablet by mouth two (2) times a day. 20 Tablet 0    ergocalciferol (ERGOCALCIFEROL) 1,250 mcg (50,000 unit) capsule Take 1 Capsule by mouth every seven (7) days. 12 Capsule 3    chlorthalidone (HYGROTON) 25 mg tablet Take 1 tablet by mouth once daily 90 Tablet 0    glipiZIDE (GLUCOTROL) 10 mg tablet TAKE 1 & 1/2 (ONE & ONE-HALF) TABLETS BY MOUTH TWICE DAILY 270 Tablet 4    metOLazone (ZAROXOLYN) 5 mg tablet Take 2 Tablets by mouth daily. 180 Tablet 1    potassium chloride (K-DUR, KLOR-CON) 20 mEq tablet Take 1 Tablet by mouth three (3) times daily. Indications: low amount of potassium in the blood 270 Tablet 2    clobetasoL (TEMOVATE) 0.05 % topical cream Apply  to affected area two (2) times a day. 60 g 3    torsemide (DEMADEX) 20 mg tablet Take 3 Tablets by mouth daily. 90 Tablet 1    ketoconazole (NIZORAL) 2 % topical cream APPLY TO THE AFFECTED AREA(S) TWICE DAILY 60 g 0    amLODIPine-benazepril (LOTREL) 10-40 mg per capsule Take 1 Cap by mouth daily. 30 Cap 5    metFORMIN (GLUCOPHAGE) 1,000 mg tablet TAKE ONE TABLET BY MOUTH TWICE DAILY WITH MEALS 180 Tab 4    gabapentin (NEURONTIN) 100 mg capsule Take 1 Cap by mouth three (3) times daily. Max Daily Amount: 300 mg. (Patient taking differently: Take 100 mg by mouth three (3) times daily. As needed) 60 Cap 0    diclofenac (VOLTAREN) 1 % gel Apply 2 g to affected area four (4) times daily. 100 g 2    glucose blood VI test strips (ASCENSIA AUTODISC VI, ONE TOUCH ULTRA TEST VI) strip by Does Not Apply route.  As directed 1 Package 11    Lancets Misc by Does Not Apply route. As directed   1 Package 11    orlistat (XENICAL) 120 mg capsule Take 120 mg by mouth three (3) times daily (with meals). (Patient not taking: Reported on 11/15/2021) 90 Cap 6    aspirin 81 mg tablet Take 1 Tab by mouth daily. (Patient not taking: Reported on 11/15/2021) 30 Tab 11     Allergies   Allergen Reactions    Statins-Hmg-Coa Reductase Inhibitors Myalgia    Wheat Hives     Past Medical History:   Diagnosis Date    Acute pain of left knee 8/21/2017    Acute pain of left knee 8/21/2017    Diabetes mellitus type II, uncontrolled ([facility] 8/8/2014    Diabetic polyneuropathy associated with type 2 diabetes mellitus ([facility] 11/22/2016    DM (diabetes mellitus) ([facility] 6/15/2010    GERD (gastroesophageal reflux disease) 2/21/2014    Hypercholesterolemia 6/15/2010    Hypertension     Prostate infarction 3/28/2016    Screening for depression 3/30/2014    Tinea cruris 3/13/2014    Urinary frequency 3/28/2016     History reviewed. No pertinent surgical history.   Family History   Problem Relation Age of Onset    Lung Disease Mother     Hypertension Father     Heart Disease Father      Social History     Tobacco Use    Smoking status: Never Smoker    Smokeless tobacco: Never Used   Substance Use Topics    Alcohol use: No      Lab Results   Component Value Date/Time    Hemoglobin A1c 9.1 (H) 06/18/2018 11:35 AM    Hemoglobin A1c 8.6 (H) 08/08/2014 12:18 PM    Hemoglobin A1c 8.6 (H) 02/21/2014 10:04 AM    Glucose 121 (H) 11/15/2021 11:01 AM    Glucose (POC) 292 (H) 06/15/2010 05:14 AM    Glucose POC 205mg/dl 06/15/2010 03:13 PM    Microalbumin/Creat ratio (mg/g creat) 616 (H) 11/15/2021 11:01 AM    Microalbumin,urine random 56.50 11/15/2021 11:01 AM    LDL, calculated 88.2 11/15/2021 11:01 AM    Creatinine 1.36 (H) 11/15/2021 11:01 AM      Lab Results   Component Value Date/Time    Cholesterol, total 155 11/15/2021 11:01 AM    HDL Cholesterol 50 11/15/2021 11:01 AM    LDL, calculated 88.2 11/15/2021 11:01 AM    Triglyceride 84 11/15/2021 11:01 AM    CHOL/HDL Ratio 3.1 11/15/2021 11:01 AM        Review of Systems   Constitutional: Negative for chills and fever. HENT: Negative for congestion and nosebleeds. Eyes: Negative for blurred vision and pain. Respiratory: Negative for cough, shortness of breath and wheezing. Cardiovascular: Negative for chest pain and leg swelling. Gastrointestinal: Negative for constipation, diarrhea, nausea and vomiting. Genitourinary: Negative for dysuria and frequency. Musculoskeletal: Negative for joint pain and myalgias. Skin: Negative for itching and rash. Neurological: Negative for dizziness, loss of consciousness and headaches. Psychiatric/Behavioral: Negative for depression. The patient is not nervous/anxious and does not have insomnia. Physical Exam  Vitals and nursing note reviewed. Constitutional:       Appearance: He is well-developed. HENT:      Head: Normocephalic and atraumatic. Mouth/Throat:      Pharynx: No oropharyngeal exudate. Eyes:      Conjunctiva/sclera: Conjunctivae normal.      Pupils: Pupils are equal, round, and reactive to light. Neck:      Thyroid: No thyromegaly. Vascular: No JVD. Cardiovascular:      Rate and Rhythm: Normal rate and regular rhythm. Heart sounds: Normal heart sounds. No murmur heard. No friction rub. Pulmonary:      Effort: Pulmonary effort is normal. No respiratory distress. Breath sounds: Normal breath sounds. No wheezing or rales. Abdominal:      General: Bowel sounds are normal. There is no distension. Palpations: Abdomen is soft. Tenderness: There is no abdominal tenderness. Musculoskeletal:         General: No tenderness. Cervical back: Normal range of motion and neck supple. Lymphadenopathy:      Cervical: No cervical adenopathy. Skin:     General: Skin is warm. Findings: No erythema or rash.    Neurological:      Mental Status: He is alert and oriented to person, place, and time. Deep Tendon Reflexes: Reflexes are normal and symmetric. Psychiatric:         Behavior: Behavior normal.         ASSESSMENT and PLAN  Diagnoses and all orders for this visit:    1. Type 2 diabetes with nephropathy (HCC)  -     flash glucose sensor (FreeStyle Daniel 2 Sensor) kit; Tid prn  -     AMB POC HEMOGLOBIN A1C  -     gabapentin (NEURONTIN) 100 mg capsule; Take 1 Capsule by mouth three (3) times daily. Max Daily Amount: 300 mg.    2. Diabetic polyneuropathy associated with type 2 diabetes mellitus (HCC)  -     flash glucose sensor (FreeStyle Daniel 2 Sensor) kit; Tid prn  -     AMB POC HEMOGLOBIN A1C  -     gabapentin (NEURONTIN) 100 mg capsule; Take 1 Capsule by mouth three (3) times daily. Max Daily Amount: 300 mg.    3. Gout, unspecified cause, unspecified chronicity, unspecified site  -     betamethasone dipropionate (DIPROSONE) 0.05 % topical cream; Apply  to affected area two (2) times a day. -     gabapentin (NEURONTIN) 100 mg capsule; Take 1 Capsule by mouth three (3) times daily. Max Daily Amount: 300 mg.    4. Wrist pain, acute, right  -     gabapentin (NEURONTIN) 100 mg capsule; Take 1 Capsule by mouth three (3) times daily. Max Daily Amount: 300 mg. Patients pain not controlled,  was told to help with weight reduction, manipulations, massage therapy, exercise therapy:  Patient was also told to remain active but to avoid heavy lifting and pushing at this time for the next 6 weeks which is the time of the recovery for most of the   pain duration of healing. Advised for self cared options such as: no Nsiaids,   Tylenol for pain, take meds w/ food and water, if develop abdominal upsets, such as heart burn and sour stomach,  meds side effects and compliancy advised,  Call or rtc if worsens,   Pt agreed with today's recommendations.

## 2022-02-21 NOTE — LETTER
NOTIFICATION RETURN TO WORK / SCHOOL      2/21/2022 10:56 AM    Mr. Leighann Velasquez  [facility] [facility] [address]      To Whom It May Concern:    Leighann Velasquez is currently under the care of [address] [place] [address] OFFICE-[facility]. He will need to have work accommodation for the next 4 weeks to work from home due to the current     medical condition. If there are questions or concerns please have the patient contact our office.         Sincerely,      Glory Mo MD

## 2022-02-21 NOTE — PROGRESS NOTES
Room 3     Identified pt with two pt identifiers(name and ). Reviewed record in preparation for visit and have obtained necessary documentation. All patient medications has been reviewed. Chief Complaint   Patient presents with    Diabetes     and covid 2 month f/u       3 most recent PHQ Screens 2022   Little interest or pleasure in doing things Not at all   Feeling down, depressed, irritable, or hopeless Not at all   Total Score PHQ 2 0     Abuse Screening Questionnaire 3/23/2021   Do you ever feel afraid of your partner? N   Are you in a relationship with someone who physically or mentally threatens you? N   Is it safe for you to go home? Y       Health Maintenance Due   Topic    Eye Exam Retinal or Dilated     Shingrix Vaccine Age 49> (1 of 2)    Colorectal Cancer Screening Combo          Health Maintenance Review: Patient reminded of \"due or due soon\" health maintenance. I have asked the patient to contact his/her primary care provider (PCP) for follow-up on his/her health maintenance. Vitals:    22 0938   BP: 122/76   Pulse: 63   Resp: 15   Temp: 98.5 °F (36.9 °C)   TempSrc: Oral   SpO2: 96%   Weight: (!) 380 lb 11.2 oz (172.7 kg)   Height: 6' (1.829 m)   PainSc:   5   PainLoc: Generalized       Wt Readings from Last 3 Encounters:   22 (!) 380 lb 11.2 oz (172.7 kg)   11/15/21 (!) 388 lb 14.4 oz (176.4 kg)   20 (!) 392 lb 8 oz (178 kg)     Temp Readings from Last 3 Encounters:   22 98.5 °F (36.9 °C) (Oral)   20 98.3 °F (36.8 °C) (Oral)   19 98.1 °F (36.7 °C) (Oral)     BP Readings from Last 3 Encounters:   22 122/76   11/15/21 (!) 146/76   20 (!) 150/91     Pulse Readings from Last 3 Encounters:   22 63   11/15/21 (!) 54   20 62       Coordination of Care Questionnaire:   1) Have you been to an emergency room, urgent care, or hospitalized since your last visit? yes     21 \[facility] [place] [facility]\""     2.  Have seen or consulted any other health care provider since your last visit? NO    Patient is accompanied by self I have received verbal consent from Vandana Ayala to discuss any/all medical information while they are present in the room.

## 2022-02-23 RX ORDER — AMLODIPINE BESYLATE AND BENAZEPRIL HYDROCHLORIDE 10; 40 MG/1; MG/1
CAPSULE ORAL
Qty: 30 CAPSULE | Refills: 0 | Status: SHIPPED | OUTPATIENT
Start: 2022-02-23 | End: 2022-03-27

## 2022-03-14 RX ORDER — CHLORTHALIDONE 25 MG/1
25 TABLET ORAL DAILY
Qty: 90 TABLET | Refills: 0 | Status: CANCELLED | OUTPATIENT
Start: 2022-03-14

## 2022-03-14 RX ORDER — TORSEMIDE 20 MG/1
60 TABLET ORAL DAILY
Qty: 90 TABLET | Refills: 1 | Status: CANCELLED | OUTPATIENT
Start: 2022-03-14

## 2022-03-14 RX ORDER — CHLORTHALIDONE 25 MG/1
TABLET ORAL
Qty: 90 TABLET | Refills: 0 | Status: SHIPPED | OUTPATIENT
Start: 2022-03-14 | End: 2022-06-28 | Stop reason: SDUPTHER

## 2022-03-14 RX ORDER — TORSEMIDE 20 MG/1
TABLET ORAL
Qty: 90 TABLET | Refills: 0 | Status: SHIPPED | OUTPATIENT
Start: 2022-03-14 | End: 2022-04-28 | Stop reason: SDUPTHER

## 2022-03-16 DIAGNOSIS — I10 ESSENTIAL HYPERTENSION: ICD-10-CM

## 2022-03-16 DIAGNOSIS — E78.2 MIXED HYPERLIPIDEMIA: ICD-10-CM

## 2022-03-16 DIAGNOSIS — E03.8 TSH (THYROID-STIMULATING HORMONE DEFICIENCY): ICD-10-CM

## 2022-03-16 DIAGNOSIS — E55.9 VITAMIN D DEFICIENCY: ICD-10-CM

## 2022-03-16 RX ORDER — METFORMIN HYDROCHLORIDE 1000 MG/1
TABLET ORAL
Qty: 180 TABLET | Refills: 4 | Status: CANCELLED | OUTPATIENT
Start: 2022-03-16

## 2022-03-18 DIAGNOSIS — E03.8 TSH (THYROID-STIMULATING HORMONE DEFICIENCY): ICD-10-CM

## 2022-03-18 DIAGNOSIS — I10 ESSENTIAL HYPERTENSION: ICD-10-CM

## 2022-03-18 DIAGNOSIS — E55.9 VITAMIN D DEFICIENCY: ICD-10-CM

## 2022-03-18 DIAGNOSIS — E78.2 MIXED HYPERLIPIDEMIA: ICD-10-CM

## 2022-03-18 PROBLEM — E11.21 TYPE 2 DIABETES WITH NEPHROPATHY (HCC): Status: ACTIVE | Noted: 2018-02-19

## 2022-03-18 RX ORDER — METFORMIN HYDROCHLORIDE 1000 MG/1
TABLET ORAL
Qty: 180 TABLET | Refills: 0 | Status: SHIPPED | OUTPATIENT
Start: 2022-03-18 | End: 2022-05-23 | Stop reason: SDUPTHER

## 2022-03-19 PROBLEM — M25.562 ACUTE PAIN OF LEFT KNEE: Status: ACTIVE | Noted: 2017-08-21

## 2022-03-27 RX ORDER — AMLODIPINE BESYLATE AND BENAZEPRIL HYDROCHLORIDE 10; 40 MG/1; MG/1
1 CAPSULE ORAL DAILY
Qty: 30 CAPSULE | Refills: 0 | Status: CANCELLED | OUTPATIENT
Start: 2022-03-27

## 2022-03-27 RX ORDER — AMLODIPINE BESYLATE AND BENAZEPRIL HYDROCHLORIDE 10; 40 MG/1; MG/1
CAPSULE ORAL
Qty: 30 CAPSULE | Refills: 0 | Status: SHIPPED | OUTPATIENT
Start: 2022-03-27 | End: 2022-05-02

## 2022-04-28 ENCOUNTER — VIRTUAL VISIT (OUTPATIENT)
Dept: FAMILY MEDICINE CLINIC | Age: 52
End: 2022-04-28
Payer: COMMERCIAL

## 2022-04-28 DIAGNOSIS — R29.898 RIGHT ARM WEAKNESS: Primary | ICD-10-CM

## 2022-04-28 DIAGNOSIS — Z71.89 ADVICE GIVEN ABOUT COVID-19 VIRUS INFECTION: ICD-10-CM

## 2022-04-28 DIAGNOSIS — E11.8 TYPE II DIABETES MELLITUS WITH COMPLICATION (HCC): ICD-10-CM

## 2022-04-28 DIAGNOSIS — R26.89 LOSS OF BALANCE: ICD-10-CM

## 2022-04-28 PROCEDURE — 3046F HEMOGLOBIN A1C LEVEL >9.0%: CPT | Performed by: FAMILY MEDICINE

## 2022-04-28 PROCEDURE — 99213 OFFICE O/P EST LOW 20 MIN: CPT | Performed by: FAMILY MEDICINE

## 2022-04-28 NOTE — PROGRESS NOTES
Chief Complaint   Patient presents with    Arm Pain     right        1. Have you been to the ER, urgent care clinic since your last visit? Hospitalized since your last visit? No    2. Have you seen or consulted any other health care providers outside of the [address] [place] [address] since your last visit? Include any pap smears or colon screening. Yes When: 03/2022 Where: dermatology Reason for visit: eczema     Call placed to pt. Verified patient with two type of identifiers, stated he thinks he had a stroke around a month, did not go to ER , woke up -unable to use right side,  Has noticed  weakness to right arm, hand  and shoulder . Unable to  objects-hurts to , denies injury     Blood sugar 110 yesterday.

## 2022-04-29 RX ORDER — TORSEMIDE 20 MG/1
TABLET ORAL
Qty: 90 TABLET | Refills: 0 | Status: SHIPPED | OUTPATIENT
Start: 2022-04-29 | End: 2022-05-31 | Stop reason: SDUPTHER

## 2022-04-29 NOTE — PROGRESS NOTES
HISTORY OF PRESENT ILLNESS  Ankita Bailey is a 46 y.o. male, A Covid vaccinated x3, and nicely masked Denies flu like sxs, with current medical history of  Uncontrolled diabetic state, morbid obesity HTN, hypercholesteremia, GEOFF,   present with the following concern for complaint of losing balance right arm weakness tingling sensation in addition patient has had COVID 19 infection,  she talked to pulm was told that his lung needs to be checked, so awaiting a PFT, but has no strength in hte rt arm, occurred 2 months ago, the pulmon told him to be neuropathy, patient also states that he has been checking his sugar has been ranging from , denies any other complaint at this time   Current Outpatient Medications   Medication Sig Dispense Refill    amLODIPine-benazepril (LOTREL) 10-40 mg per capsule Take 1 capsule by mouth once daily 30 Capsule 0    metFORMIN (GLUCOPHAGE) 1,000 mg tablet TAKE 1 TABLET BY MOUTH TWICE DAILY WITH MEALS 180 Tablet 0    chlorthalidone (HYGROTON) 25 mg tablet Take 1 tablet by mouth once daily 90 Tablet 0    torsemide (DEMADEX) 20 mg tablet Take 3 tablets by mouth once daily 90 Tablet 0    MAGNESIUM PO Take  by mouth.  betamethasone dipropionate (DIPROSONE) 0.05 % topical cream Apply  to affected area two (2) times a day. 45 g 5    Glyxambi 25-5 mg tab Take 1 tablet by mouth once daily 90 Tablet 0    ergocalciferol (ERGOCALCIFEROL) 1,250 mcg (50,000 unit) capsule Take 1 Capsule by mouth every seven (7) days. 12 Capsule 3    glipiZIDE (GLUCOTROL) 10 mg tablet TAKE 1 & 1/2 (ONE & ONE-HALF) TABLETS BY MOUTH TWICE DAILY 270 Tablet 4    potassium chloride (K-DUR, KLOR-CON) 20 mEq tablet Take 1 Tablet by mouth three (3) times daily. Indications: low amount of potassium in the blood 270 Tablet 2    glucose blood VI test strips (ASCENSIA AUTODISC VI, ONE TOUCH ULTRA TEST VI) strip by Does Not Apply route.  As directed 1 Package 11    cyanocobalamin 1,000 mcg tablet Take 1,000 mcg by mouth daily. 10 Mg (Patient not taking: Reported on 4/28/2022)      flash glucose sensor (FreeStyle Daniel 2 Sensor) kit Tid prn (Patient not taking: Reported on 4/28/2022) 1 Kit 3    gabapentin (NEURONTIN) 100 mg capsule Take 1 Capsule by mouth three (3) times daily. Max Daily Amount: 300 mg. (Patient not taking: Reported on 4/28/2022) 90 Capsule 0    guaiFENesin ER (MUCINEX) 600 mg ER tablet Take 1 Tablet by mouth two (2) times a day. (Patient not taking: Reported on 4/28/2022) 20 Tablet 0    metOLazone (ZAROXOLYN) 5 mg tablet Take 2 Tablets by mouth daily. (Patient not taking: Reported on 4/28/2022) 180 Tablet 1    clobetasoL (TEMOVATE) 0.05 % topical cream Apply  to affected area two (2) times a day. 60 g 3    ketoconazole (NIZORAL) 2 % topical cream APPLY TO THE AFFECTED AREA(S) TWICE DAILY (Patient not taking: Reported on 4/28/2022) 60 g 0    diclofenac (VOLTAREN) 1 % gel Apply 2 g to affected area four (4) times daily. (Patient not taking: Reported on 4/28/2022) 100 g 2    aspirin 81 mg tablet Take 1 Tab by mouth daily. (Patient not taking: Reported on 11/15/2021) 30 Tab 11    Lancets Misc by Does Not Apply route. As directed   (Patient not taking: Reported on 4/28/2022) 1 Package 11     Allergies   Allergen Reactions    Statins-Hmg-Coa Reductase Inhibitors Myalgia    Wheat Hives     Past Medical History:   Diagnosis Date    Acute pain of left knee 8/21/2017    Acute pain of left knee 8/21/2017    Diabetes mellitus type II, uncontrolled ([address] 8/8/2014    Diabetic polyneuropathy associated with type 2 diabetes mellitus ([address] 11/22/2016    DM (diabetes mellitus) ([address] 6/15/2010    GERD (gastroesophageal reflux disease) 2/21/2014    Hypercholesterolemia 6/15/2010    Hypertension     Prostate infarction 3/28/2016    Screening for depression 3/30/2014    Tinea cruris 3/13/2014    Urinary frequency 3/28/2016     No past surgical history on file.   Family History   Problem Relation Age of Onset  Lung Disease Mother     Hypertension Father     Heart Disease Father      Social History     Tobacco Use    Smoking status: Never Smoker    Smokeless tobacco: Never Used   Substance Use Topics    Alcohol use: No      Lab Results   Component Value Date/Time    WBC 9.8 11/15/2021 11:01 AM    HGB 15.7 11/15/2021 11:01 AM    HCT 50.8 (H) 11/15/2021 11:01 AM    PLATELET 161 41/75/6156 11:01 AM    MCV 86.8 11/15/2021 11:01 AM     Lab Results   Component Value Date/Time    GFR est non-AA 55 (L) 11/15/2021 11:01 AM    GFR est AA >60 11/15/2021 11:01 AM    Creatinine 1.36 (H) 11/15/2021 11:01 AM    BUN 18 11/15/2021 11:01 AM    Sodium 138 11/15/2021 11:01 AM    Potassium 3.8 11/15/2021 11:01 AM    Chloride 101 11/15/2021 11:01 AM    CO2 31 11/15/2021 11:01 AM    Magnesium 1.8 06/14/2010 04:45 AM        Review of Systems   Constitutional: Positive for malaise/fatigue. Negative for chills and fever. HENT: Negative for congestion and nosebleeds. Eyes: Negative for blurred vision and pain. Respiratory: Negative for cough, shortness of breath and wheezing. Cardiovascular: Negative for chest pain and leg swelling. Gastrointestinal: Negative for constipation, diarrhea, nausea and vomiting. Genitourinary: Negative for dysuria and frequency. Musculoskeletal: Positive for myalgias. Negative for joint pain. Skin: Negative for itching and rash. Neurological: Positive for dizziness, sensory change and weakness. Negative for loss of consciousness and headaches. Psychiatric/Behavioral: Negative for depression. The patient is not nervous/anxious and does not have insomnia. Physical Exam  Constitutional:       Appearance: Normal appearance. HENT:      Head: Normocephalic and atraumatic. Nose: Nose normal. No congestion. Musculoskeletal:         General: Swelling and tenderness present. Neurological:      Mental Status: He is alert and oriented to person, place, and time.    Psychiatric: Mood and Affect: Mood normal.         Behavior: Behavior normal.         Thought Content: Thought content normal.         Judgment: Judgment normal.         ASSESSMENT and PLAN  Diagnoses and all orders for this visit:    1. Right arm weakness    2. Loss of balance    3. Type II diabetes mellitus with complication (HCC)    4. Advice given about COVID-19 virus infection    Other orders  -     torsemide (DEMADEX) 20 mg tablet;  Take 3 tablets by mouth once daily      Today patient was told to call 911 immediately to be taken to emergency room rule out acute cerebrovascular accident patient acknowledged and agreed

## 2022-05-02 RX ORDER — AMLODIPINE BESYLATE AND BENAZEPRIL HYDROCHLORIDE 10; 40 MG/1; MG/1
CAPSULE ORAL
Qty: 30 CAPSULE | Refills: 0 | Status: SHIPPED | OUTPATIENT
Start: 2022-05-02 | End: 2022-05-27 | Stop reason: SDUPTHER

## 2022-05-02 RX ORDER — AMLODIPINE BESYLATE AND BENAZEPRIL HYDROCHLORIDE 10; 40 MG/1; MG/1
1 CAPSULE ORAL DAILY
Qty: 30 CAPSULE | Refills: 0 | Status: SHIPPED | OUTPATIENT
Start: 2022-05-02

## 2022-05-02 NOTE — TELEPHONE ENCOUNTER
Last visit: 4/28/22  Next visit: 5/23/22  Previous refill 3/27/22    Requested Prescriptions     Pending Prescriptions Disp Refills    amLODIPine-benazepril (LOTREL) 10-40 mg per capsule 30 Capsule 0     Sig: Take 1 Capsule by mouth daily.

## 2022-05-23 ENCOUNTER — OFFICE VISIT (OUTPATIENT)
Dept: FAMILY MEDICINE CLINIC | Age: 52
End: 2022-05-23
Payer: COMMERCIAL

## 2022-05-23 VITALS
BODY MASS INDEX: 41.75 KG/M2 | HEIGHT: 73 IN | OXYGEN SATURATION: 98 % | WEIGHT: 315 LBS | SYSTOLIC BLOOD PRESSURE: 115 MMHG | TEMPERATURE: 98.1 F | DIASTOLIC BLOOD PRESSURE: 80 MMHG | RESPIRATION RATE: 18 BRPM | HEART RATE: 56 BPM

## 2022-05-23 DIAGNOSIS — E03.8 TSH (THYROID-STIMULATING HORMONE DEFICIENCY): ICD-10-CM

## 2022-05-23 DIAGNOSIS — E78.2 MIXED HYPERLIPIDEMIA: ICD-10-CM

## 2022-05-23 DIAGNOSIS — E11.21 TYPE 2 DIABETES WITH NEPHROPATHY (HCC): Primary | ICD-10-CM

## 2022-05-23 DIAGNOSIS — E55.9 VITAMIN D DEFICIENCY: ICD-10-CM

## 2022-05-23 DIAGNOSIS — I10 ESSENTIAL HYPERTENSION: ICD-10-CM

## 2022-05-23 LAB — HBA1C MFR BLD HPLC: 7.7 %

## 2022-05-23 PROCEDURE — 3051F HG A1C>EQUAL 7.0%<8.0%: CPT | Performed by: FAMILY MEDICINE

## 2022-05-23 PROCEDURE — 83036 HEMOGLOBIN GLYCOSYLATED A1C: CPT | Performed by: FAMILY MEDICINE

## 2022-05-23 PROCEDURE — 99214 OFFICE O/P EST MOD 30 MIN: CPT | Performed by: FAMILY MEDICINE

## 2022-05-23 RX ORDER — EMPAGLIFLOZIN AND LINAGLIPTIN 25; 5 MG/1; MG/1
1 TABLET, FILM COATED ORAL DAILY
Qty: 90 TABLET | Refills: 3 | Status: SHIPPED | OUTPATIENT
Start: 2022-05-23

## 2022-05-23 RX ORDER — METFORMIN HYDROCHLORIDE 1000 MG/1
TABLET ORAL
Qty: 180 TABLET | Refills: 0 | Status: SHIPPED | OUTPATIENT
Start: 2022-05-23 | End: 2022-09-26 | Stop reason: SDUPTHER

## 2022-05-23 NOTE — PROGRESS NOTES
HISTORY OF PRESENT ILLNESS  Stef Evans is a 46 y.o. male, present for f/u regarding the diabetic state, patient has been compliant with diabetic meds, and is trying to have a diabetic diet, no Rf needed for today, patient currently denies tingling sensation, has no polyurea and polydipsia, last a1c was not at target of 7.7% %, Last urine microalbumin 2021 and was abnormal, patient currently on ACEI. pt also present for Bp check , compliant w/ the bp meds, a low salt diet, an  active life style,     today the pt denies Chest Pain, has no legs swelling no lightheadedness,    With multiple joint pains mild, 3/10 dull none radiating    Current Outpatient Medications   Medication Sig Dispense Refill    empagliflozin-linagliptin (Glyxambi) 25-5 mg tab Take 1 Tablet by mouth daily. 90 Tablet 3    metFORMIN (GLUCOPHAGE) 1,000 mg tablet TAKE 1 TABLET BY MOUTH TWICE DAILY WITH MEALS 180 Tablet 0    amLODIPine-benazepril (LOTREL) 10-40 mg per capsule Take 1 capsule by mouth once daily 30 Capsule 0    torsemide (DEMADEX) 20 mg tablet Take 3 tablets by mouth once daily 90 Tablet 0    chlorthalidone (HYGROTON) 25 mg tablet Take 1 tablet by mouth once daily 90 Tablet 0    MAGNESIUM PO Take  by mouth.  flash glucose sensor (FreeStyle Daniel 2 Sensor) kit Tid prn 1 Kit 3    betamethasone dipropionate (DIPROSONE) 0.05 % topical cream Apply  to affected area two (2) times a day. 45 g 5    gabapentin (NEURONTIN) 100 mg capsule Take 1 Capsule by mouth three (3) times daily. Max Daily Amount: 300 mg. 90 Capsule 0    guaiFENesin ER (MUCINEX) 600 mg ER tablet Take 1 Tablet by mouth two (2) times a day. 20 Tablet 0    ergocalciferol (ERGOCALCIFEROL) 1,250 mcg (50,000 unit) capsule Take 1 Capsule by mouth every seven (7) days.  12 Capsule 3    glipiZIDE (GLUCOTROL) 10 mg tablet TAKE 1 & 1/2 (ONE & ONE-HALF) TABLETS BY MOUTH TWICE DAILY 270 Tablet 4    potassium chloride (K-DUR, KLOR-CON) 20 mEq tablet Take 1 Tablet by mouth three (3) times daily. Indications: low amount of potassium in the blood 270 Tablet 2    ketoconazole (NIZORAL) 2 % topical cream APPLY TO THE AFFECTED AREA(S) TWICE DAILY 60 g 0    glucose blood VI test strips (ASCENSIA AUTODISC VI, ONE TOUCH ULTRA TEST VI) strip by Does Not Apply route. As directed 1 Package 11    Lancets Misc by Does Not Apply route. As directed   1 Package 11    amLODIPine-benazepril (LOTREL) 10-40 mg per capsule Take 1 Capsule by mouth daily. (Patient not taking: Reported on 5/23/2022) 30 Capsule 0    cyanocobalamin 1,000 mcg tablet Take 1,000 mcg by mouth daily. 10 Mg (Patient not taking: Reported on 5/23/2022)      metOLazone (ZAROXOLYN) 5 mg tablet Take 2 Tablets by mouth daily. (Patient not taking: Reported on 5/23/2022) 180 Tablet 1    clobetasoL (TEMOVATE) 0.05 % topical cream Apply  to affected area two (2) times a day. (Patient not taking: Reported on 5/23/2022) 60 g 3    diclofenac (VOLTAREN) 1 % gel Apply 2 g to affected area four (4) times daily. (Patient not taking: Reported on 4/28/2022) 100 g 2    aspirin 81 mg tablet Take 1 Tab by mouth daily. (Patient not taking: Reported on 11/15/2021) 30 Tab 11     Allergies   Allergen Reactions    Statins-Hmg-Coa Reductase Inhibitors Myalgia    Wheat Hives     Past Medical History:   Diagnosis Date    Acute pain of left knee 8/21/2017    Acute pain of left knee 8/21/2017    Diabetes mellitus type II, uncontrolled 8/8/2014    Diabetic polyneuropathy associated with type 2 diabetes mellitus ([facility] [address] 11/22/2016    DM (diabetes mellitus) ([facility] [address] 6/15/2010    GERD (gastroesophageal reflux disease) 2/21/2014    Hypercholesterolemia 6/15/2010    Hypertension     Prostate infarction 3/28/2016    Screening for depression 3/30/2014    Tinea cruris 3/13/2014    Urinary frequency 3/28/2016     No past surgical history on file.   Family History   Problem Relation Age of Onset    Lung Disease Mother     Hypertension Father    [place] Heart Disease Father      Social History     Tobacco Use    Smoking status: Never Smoker    Smokeless tobacco: Never Used   Substance Use Topics    Alcohol use: No      Lab Results   Component Value Date/Time    WBC 9.8 11/15/2021 11:01 AM    HGB 15.7 11/15/2021 11:01 AM    HCT 50.8 (H) 11/15/2021 11:01 AM    PLATELET 390 72/76/8741 11:01 AM    MCV 86.8 11/15/2021 11:01 AM     Lab Results   Component Value Date/Time    Hemoglobin A1c 9.1 (H) 06/18/2018 11:35 AM    Hemoglobin A1c 8.6 (H) 08/08/2014 12:18 PM    Hemoglobin A1c 8.6 (H) 02/21/2014 10:04 AM    Glucose 121 (H) 11/15/2021 11:01 AM    Glucose (POC) 292 (H) 06/15/2010 05:14 AM    Glucose POC 205mg/dl 06/15/2010 03:13 PM    Microalbumin/Creat ratio (mg/g creat) 616 (H) 11/15/2021 11:01 AM    Microalbumin,urine random 56.50 11/15/2021 11:01 AM    LDL, calculated 88.2 11/15/2021 11:01 AM    Creatinine 1.36 (H) 11/15/2021 11:01 AM         Review of Systems   Constitutional: Negative for chills and fever. HENT: Negative for congestion and nosebleeds. Eyes: Negative for blurred vision and pain. Respiratory: Negative for cough, shortness of breath and wheezing. Cardiovascular: Negative for chest pain and leg swelling. Gastrointestinal: Negative for constipation, diarrhea, nausea and vomiting. Genitourinary: Negative for dysuria and frequency. Musculoskeletal: Positive for joint pain. Negative for myalgias. Skin: Negative for itching and rash. Neurological: Negative for dizziness, loss of consciousness and headaches. Psychiatric/Behavioral: Negative for depression. The patient is not nervous/anxious and does not have insomnia. Physical Exam  Vitals and nursing note reviewed. Constitutional:       Appearance: He is well-developed. HENT:      Head: Normocephalic and atraumatic. Mouth/Throat:      Pharynx: No oropharyngeal exudate.    Eyes:      Conjunctiva/sclera: Conjunctivae normal.      Pupils: Pupils are equal, round, and reactive to light. Neck:      Thyroid: No thyromegaly. Vascular: No JVD. Cardiovascular:      Rate and Rhythm: Normal rate and regular rhythm. Heart sounds: Normal heart sounds. No murmur heard. No friction rub. Pulmonary:      Effort: Pulmonary effort is normal. No respiratory distress. Breath sounds: Normal breath sounds. No wheezing or rales. Abdominal:      General: Bowel sounds are normal. There is no distension. Palpations: Abdomen is soft. Tenderness: There is no abdominal tenderness. Musculoskeletal:         General: No tenderness. Cervical back: Normal range of motion and neck supple. Left foot: Normal range of motion and normal capillary refill. No swelling, tenderness or bony tenderness. Comments: Nl pulses, nl visual inspection nl monoF   Lymphadenopathy:      Cervical: No cervical adenopathy. Skin:     General: Skin is warm. Findings: No erythema or rash. Neurological:      Mental Status: He is alert and oriented to person, place, and time. Deep Tendon Reflexes: Reflexes are normal and symmetric. Psychiatric:         Behavior: Behavior normal.         ASSESSMENT and PLAN  Diagnoses and all orders for this visit:    1. Type 2 diabetes with nephropathy (HCC)  -     AMB POC HEMOGLOBIN A1C  -     GARY COMPREHENSIVE PLUS PANEL; Future  -     URIC ACID; Future  -     CBC WITH AUTOMATED DIFF; Future  -     METABOLIC PANEL, COMPREHENSIVE; Future    2. TSH (thyroid-stimulating hormone deficiency)  -     metFORMIN (GLUCOPHAGE) 1,000 mg tablet; TAKE 1 TABLET BY MOUTH TWICE DAILY WITH MEALS  -     GARY COMPREHENSIVE PLUS PANEL; Future  -     URIC ACID; Future  -     CBC WITH AUTOMATED DIFF; Future  -     METABOLIC PANEL, COMPREHENSIVE; Future    3. Vitamin D deficiency  -     metFORMIN (GLUCOPHAGE) 1,000 mg tablet; TAKE 1 TABLET BY MOUTH TWICE DAILY WITH MEALS  -     GARY COMPREHENSIVE PLUS PANEL; Future  -     URIC ACID;  Future  -     CBC WITH AUTOMATED DIFF; Future  -     METABOLIC PANEL, COMPREHENSIVE; Future    4. Mixed hyperlipidemia  -     metFORMIN (GLUCOPHAGE) 1,000 mg tablet; TAKE 1 TABLET BY MOUTH TWICE DAILY WITH MEALS  -     GARY COMPREHENSIVE PLUS PANEL; Future  -     URIC ACID; Future  -     CBC WITH AUTOMATED DIFF; Future  -     METABOLIC PANEL, COMPREHENSIVE; Future    5. Essential hypertension  -     metFORMIN (GLUCOPHAGE) 1,000 mg tablet; TAKE 1 TABLET BY MOUTH TWICE DAILY WITH MEALS  -     GARY COMPREHENSIVE PLUS PANEL; Future  -     URIC ACID; Future  -     CBC WITH AUTOMATED DIFF; Future  -     METABOLIC PANEL, COMPREHENSIVE; Future    Other orders  -     empagliflozin-linagliptin (Glyxambi) 25-5 mg tab; Take 1 Tablet by mouth daily.   -     CKD REPORT

## 2022-05-23 NOTE — PROGRESS NOTES
Chief Complaint   Patient presents with    Diabetes     follow up        1. \"Have you been to the ER, urgent care clinic since your last visit? Hospitalized since your last visit? \" Yes When: 05/03/2022 Where: [facility]  Reason for visit: possible stroke    2. \"Have you seen or consulted any other health care providers outside of the [address] [place] [address] since your last visit? \" Yes Where: [facility]      3. For patients aged 39-70: Has the patient had a colonoscopy / FIT/ Cologuard? No      If the patient is female:    4. For patients aged 41-77: Has the patient had a mammogram within the past 2 years? NA - based on age or sex      11. For patients aged 21-65: Has the patient had a pap smear?  NA - based on age or sex    Health Maintenance Due   Topic Date Due    Pneumococcal 0-64 years (2 - PCV) 02/21/2015    Eye Exam Retinal or Dilated  07/05/2018    Shingrix Vaccine Age 50> (1 of 2) Never done    Colorectal Cancer Screening Combo  10/03/2019    COVID-19 Vaccine (3 - Booster for Pfizer series) 03/01/2022    A1C test (Diabetic or Prediabetic)  05/21/2022

## 2022-05-24 LAB
ALBUMIN SERPL-MCNC: 4.3 G/DL (ref 3.8–4.9)
ALBUMIN/GLOB SERPL: 1.1 {RATIO} (ref 1.2–2.2)
ALP SERPL-CCNC: 54 IU/L (ref 44–121)
ALT SERPL-CCNC: 14 IU/L (ref 0–44)
AST SERPL-CCNC: 15 IU/L (ref 0–40)
BASOPHILS # BLD AUTO: 0.1 X10E3/UL (ref 0–0.2)
BASOPHILS NFR BLD AUTO: 1 %
BILIRUB SERPL-MCNC: 0.3 MG/DL (ref 0–1.2)
BUN SERPL-MCNC: 37 MG/DL (ref 6–24)
BUN/CREAT SERPL: 22 (ref 9–20)
CALCIUM SERPL-MCNC: 9.4 MG/DL (ref 8.7–10.2)
CENTROMERE B AB SER-ACNC: <0.2 AI (ref 0–0.9)
CHLORIDE SERPL-SCNC: 97 MMOL/L (ref 96–106)
CHROMATIN AB SERPL-ACNC: 0.5 AI (ref 0–0.9)
CO2 SERPL-SCNC: 29 MMOL/L (ref 20–29)
CREAT SERPL-MCNC: 1.7 MG/DL (ref 0.76–1.27)
DSDNA AB SER-ACNC: 1 IU/ML (ref 0–9)
EGFR: 48 ML/MIN/1.73
ENA JO1 AB SER-ACNC: <0.2 AI (ref 0–0.9)
ENA RNP AB SER-ACNC: <0.2 AI (ref 0–0.9)
ENA SCL70 AB SER-ACNC: 0.2 AI (ref 0–0.9)
ENA SM AB SER-ACNC: <0.2 AI (ref 0–0.9)
ENA SM+RNP AB SER-ACNC: <0.2 AI (ref 0–0.9)
ENA SS-A AB SER-ACNC: <0.2 AI (ref 0–0.9)
ENA SS-B AB SER-ACNC: <0.2 AI (ref 0–0.9)
EOSINOPHIL # BLD AUTO: 0.3 X10E3/UL (ref 0–0.4)
EOSINOPHIL NFR BLD AUTO: 3 %
ERYTHROCYTE [DISTWIDTH] IN BLOOD BY AUTOMATED COUNT: 14.4 % (ref 11.6–15.4)
GLOBULIN SER CALC-MCNC: 3.9 G/DL (ref 1.5–4.5)
GLUCOSE SERPL-MCNC: 87 MG/DL (ref 65–99)
HCT VFR BLD AUTO: 48.1 % (ref 37.5–51)
HGB BLD-MCNC: 15.5 G/DL (ref 13–17.7)
IMM GRANULOCYTES # BLD AUTO: 0 X10E3/UL (ref 0–0.1)
IMM GRANULOCYTES NFR BLD AUTO: 0 %
INTERPRETATION: NORMAL
LYMPHOCYTES # BLD AUTO: 1.7 X10E3/UL (ref 0.7–3.1)
LYMPHOCYTES NFR BLD AUTO: 16 %
MCH RBC QN AUTO: 27.2 PG (ref 26.6–33)
MCHC RBC AUTO-ENTMCNC: 32.2 G/DL (ref 31.5–35.7)
MCV RBC AUTO: 85 FL (ref 79–97)
MONOCYTES # BLD AUTO: 0.9 X10E3/UL (ref 0.1–0.9)
MONOCYTES NFR BLD AUTO: 8 %
NEUTROPHILS # BLD AUTO: 7.6 X10E3/UL (ref 1.4–7)
NEUTROPHILS NFR BLD AUTO: 72 %
PLATELET # BLD AUTO: 301 X10E3/UL (ref 150–450)
POTASSIUM SERPL-SCNC: 3.6 MMOL/L (ref 3.5–5.2)
PROT SERPL-MCNC: 8.2 G/DL (ref 6–8.5)
RBC # BLD AUTO: 5.69 X10E6/UL (ref 4.14–5.8)
RIBOSOMAL P AB SER-ACNC: 0.3 AI (ref 0–0.9)
SEE BELOW:, 164879: NORMAL
SODIUM SERPL-SCNC: 143 MMOL/L (ref 134–144)
URATE SERPL-MCNC: 13.7 MG/DL (ref 3.8–8.4)
WBC # BLD AUTO: 10.5 X10E3/UL (ref 3.4–10.8)

## 2022-05-31 ENCOUNTER — PATIENT MESSAGE (OUTPATIENT)
Dept: FAMILY MEDICINE CLINIC | Age: 52
End: 2022-05-31

## 2022-05-31 NOTE — TELEPHONE ENCOUNTER
----- Message from Cayetano Pachceo sent at 5/31/2022  4:19 PM EDT -----  Regarding: Flare up  Dr. Kylie Sumner, either the new gout meds or the new pain meds caused a severe flare up over the weekend, both feet and ankles were swollen and could not walk. I have taken any more of those meds, working to get swelling down, and will try the gout meds again in a few days. [facility] Records leaving the pain meds out. [facility]

## 2022-06-02 RX ORDER — TORSEMIDE 20 MG/1
TABLET ORAL
Qty: 90 TABLET | Refills: 0 | Status: SHIPPED | OUTPATIENT
Start: 2022-06-02 | End: 2022-06-30 | Stop reason: SDUPTHER

## 2022-06-10 ENCOUNTER — VIRTUAL VISIT (OUTPATIENT)
Dept: FAMILY MEDICINE CLINIC | Age: 52
End: 2022-06-10
Payer: COMMERCIAL

## 2022-06-10 DIAGNOSIS — M10.00 ACUTE IDIOPATHIC GOUT, UNSPECIFIED SITE: ICD-10-CM

## 2022-06-10 DIAGNOSIS — M10.9 GOUT, UNSPECIFIED CAUSE, UNSPECIFIED CHRONICITY, UNSPECIFIED SITE: ICD-10-CM

## 2022-06-10 DIAGNOSIS — M25.50 PAIN, JOINT, MULTIPLE SITES: ICD-10-CM

## 2022-06-10 DIAGNOSIS — E11.21 TYPE 2 DIABETES WITH NEPHROPATHY (HCC): ICD-10-CM

## 2022-06-10 DIAGNOSIS — M25.531 WRIST PAIN, ACUTE, RIGHT: ICD-10-CM

## 2022-06-10 DIAGNOSIS — E11.42 DIABETIC POLYNEUROPATHY ASSOCIATED WITH TYPE 2 DIABETES MELLITUS (HCC): Primary | ICD-10-CM

## 2022-06-10 DIAGNOSIS — M79.10 MYALGIA: ICD-10-CM

## 2022-06-10 DIAGNOSIS — M10.00 IDIOPATHIC GOUT, UNSPECIFIED CHRONICITY, UNSPECIFIED SITE: ICD-10-CM

## 2022-06-10 DIAGNOSIS — Z02.89 ENCOUNTER TO OBTAIN EXCUSE FROM WORK: ICD-10-CM

## 2022-06-10 PROCEDURE — 99214 OFFICE O/P EST MOD 30 MIN: CPT | Performed by: FAMILY MEDICINE

## 2022-06-10 PROCEDURE — 3051F HG A1C>EQUAL 7.0%<8.0%: CPT | Performed by: FAMILY MEDICINE

## 2022-06-10 RX ORDER — ALLOPURINOL 100 MG/1
200 TABLET ORAL DAILY
Qty: 60 TABLET | Refills: 2
Start: 2022-06-10

## 2022-06-10 RX ORDER — GABAPENTIN 100 MG/1
100 CAPSULE ORAL 3 TIMES DAILY
Qty: 90 CAPSULE | Refills: 2
Start: 2022-06-10

## 2022-06-10 NOTE — PROGRESS NOTES
Chief Complaint   Patient presents with    Letter for School/Work       1. \"Have you been to the ER, urgent care clinic since your last visit? Hospitalized since your last visit? \" No    2. \"Have you seen or consulted any other health care providers outside of the [address] [place] [address] since your last visit? \" No     3. For patients aged 39-70: Has the patient had a colonoscopy / FIT/ Cologuard? No      If the patient is female:    4. For patients aged 41-77: Has the patient had a mammogram within the past 2 years? NA - based on age or sex      11. For patients aged 21-65: Has the patient had a pap smear?  NA - based on age or sex    Health Maintenance Due   Topic Date Due    Pneumococcal 0-64 years (2 - PCV) 02/21/2015    Eye Exam Retinal or Dilated  07/05/2018    Shingrix Vaccine Age 50> (1 of 2) Never done    Colorectal Cancer Screening Combo  10/03/2019

## 2022-06-10 NOTE — LETTER
6/10/2022 1:55 PM    Mr. Blake Altman  [address] [facility] [address]      To Whom It May Concern: This letter is concerning Blake Altman, : 1970, followed at  [address] [place] [address]. Due to medical problems and recurrent Gout attacks, he has been experiencing  Difficulties physically  With ambulation due to severe multiple joints pain, and because of his current medical conditions,  this patient must be excused from attending work in person, Mr. Tonio Whitlock needs to be accommodated to be working virtually from home at this time. He need this accommodation to successfully perform his job. If you have alternative suggestions regarding reasonable accommodations, please share them with me so we can work together to find a workable and effective accommodation. If you have any questions about my request you can contact me in writing or by phone. Thank you very much.            Sincerely,      Gustavo Fortune MD no abdominal pain, no bloating, no constipation, no diarrhea, no nausea and no vomiting.

## 2022-06-10 NOTE — PROGRESS NOTES
HISTORY OF PRESENT ILLNESS  Tana Tadeo is a 46 y.o. male\, present with history of gout last uric acid was elevated most recently received U acid was 13.7, patient is currently not compliant with the gout medication secondary to the gout meds flared up his condition and worsens his multiple joint pain so that he is not able to drive and get to work but able to work from Home for which requesting to be accommodated ,   patient has had an attack which did not responded well to given medication on the last visit, the pain and the swelling has subsided significantly pain is 8out of 10 dull ++radiating and is not improved since last visit        Current Outpatient Medications   Medication Sig Dispense Refill    torsemide (DEMADEX) 20 mg tablet Take 3 tablets by mouth once daily 90 Tablet 0    allopurinoL (ZYLOPRIM) 100 mg tablet Take 2 Tablets by mouth daily. 60 Tablet 2    amLODIPine-benazepril (LOTREL) 10-40 mg per capsule Take 1 Capsule by mouth daily. 90 Capsule 3    empagliflozin-linagliptin (Glyxambi) 25-5 mg tab Take 1 Tablet by mouth daily. 90 Tablet 3    metFORMIN (GLUCOPHAGE) 1,000 mg tablet TAKE 1 TABLET BY MOUTH TWICE DAILY WITH MEALS 180 Tablet 0    amLODIPine-benazepril (LOTREL) 10-40 mg per capsule Take 1 Capsule by mouth daily. 30 Capsule 0    chlorthalidone (HYGROTON) 25 mg tablet Take 1 tablet by mouth once daily 90 Tablet 0    cyanocobalamin 1,000 mcg tablet Take 1,000 mcg by mouth daily. 10 Mg       MAGNESIUM PO Take  by mouth.  flash glucose sensor (FreeStyle Daniel 2 Sensor) kit Tid prn 1 Kit 3    betamethasone dipropionate (DIPROSONE) 0.05 % topical cream Apply  to affected area two (2) times a day. 45 g 5    gabapentin (NEURONTIN) 100 mg capsule Take 1 Capsule by mouth three (3) times daily. Max Daily Amount: 300 mg. 90 Capsule 0    guaiFENesin ER (MUCINEX) 600 mg ER tablet Take 1 Tablet by mouth two (2) times a day.  20 Tablet 0    ergocalciferol (ERGOCALCIFEROL) 1,250 mcg (50,000 unit) capsule Take 1 Capsule by mouth every seven (7) days. 12 Capsule 3    glipiZIDE (GLUCOTROL) 10 mg tablet TAKE 1 & 1/2 (ONE & ONE-HALF) TABLETS BY MOUTH TWICE DAILY 270 Tablet 4    metOLazone (ZAROXOLYN) 5 mg tablet Take 2 Tablets by mouth daily. 180 Tablet 1    potassium chloride (K-DUR, KLOR-CON) 20 mEq tablet Take 1 Tablet by mouth three (3) times daily. Indications: low amount of potassium in the blood 270 Tablet 2    clobetasoL (TEMOVATE) 0.05 % topical cream Apply  to affected area two (2) times a day. 60 g 3    ketoconazole (NIZORAL) 2 % topical cream APPLY TO THE AFFECTED AREA(S) TWICE DAILY 60 g 0    diclofenac (VOLTAREN) 1 % gel Apply 2 g to affected area four (4) times daily. 100 g 2    aspirin 81 mg tablet Take 1 Tab by mouth daily. 30 Tab 11    glucose blood VI test strips (ASCENSIA AUTODISC VI, ONE TOUCH ULTRA TEST VI) strip by Does Not Apply route. As directed 1 Package 11    Lancets Misc by Does Not Apply route. As directed   1 Package 11     Allergies   Allergen Reactions    Statins-Hmg-Coa Reductase Inhibitors Myalgia    Wheat Hives     Past Medical History:   Diagnosis Date    Acute pain of left knee 8/21/2017    Acute pain of left knee 8/21/2017    Diabetes mellitus type II, uncontrolled 8/8/2014    Diabetic polyneuropathy associated with type 2 diabetes mellitus ([facility] [address] 11/22/2016    DM (diabetes mellitus) ([facility] [address] 6/15/2010    GERD (gastroesophageal reflux disease) 2/21/2014    Hypercholesterolemia 6/15/2010    Hypertension     Prostate infarction 3/28/2016    Screening for depression 3/30/2014    Tinea cruris 3/13/2014    Urinary frequency 3/28/2016     History reviewed. No pertinent surgical history.   Family History   Problem Relation Age of Onset    Lung Disease Mother     Hypertension Father     Heart Disease Father      Social History     Tobacco Use    Smoking status: Never Smoker    Smokeless tobacco: Never Used   Substance Use Topics    Alcohol use: No      Lab Results   Component Value Date/Time    Cholesterol, total 155 11/15/2021 11:01 AM    HDL Cholesterol 50 11/15/2021 11:01 AM    LDL, calculated 88.2 11/15/2021 11:01 AM    Triglyceride 84 11/15/2021 11:01 AM    CHOL/HDL Ratio 3.1 11/15/2021 11:01 AM     Lab Results   Component Value Date/Time    ALT (SGPT) 14 05/23/2022 12:00 AM    Alk. phosphatase 54 05/23/2022 12:00 AM    Bilirubin, total 0.3 05/23/2022 12:00 AM    Albumin 4.3 05/23/2022 12:00 AM    Protein, total 8.2 05/23/2022 12:00 AM    INR 1.2 (H) 06/12/2010 10:17 PM    Prothrombin time 12.7 (H) 06/12/2010 10:17 PM    PLATELET 120 20/18/5143 12:00 AM    Hepatitis B surface Ag <0.10 11/15/2021 11:01 AM        Review of Systems   Constitutional: Negative for chills and fever. HENT: Negative for congestion and nosebleeds. Eyes: Negative for blurred vision and pain. Respiratory: Negative for cough, shortness of breath and wheezing. Cardiovascular: Negative for chest pain and leg swelling. Gastrointestinal: Negative for constipation, diarrhea, nausea and vomiting. Genitourinary: Negative for dysuria and frequency. Musculoskeletal: Positive for back pain, joint pain, myalgias and neck pain. Skin: Negative for itching and rash. Neurological: Negative for dizziness, loss of consciousness and headaches. Psychiatric/Behavioral: Negative for depression. The patient has insomnia. The patient is not nervous/anxious. Physical Exam  Constitutional:       Appearance: Normal appearance. HENT:      Head: Normocephalic and atraumatic. Nose: Nose normal. No congestion. Neurological:      Mental Status: He is alert and oriented to person, place, and time. Psychiatric:         Mood and Affect: Mood normal.         Behavior: Behavior normal.         Thought Content: Thought content normal.         Judgment: Judgment normal.         ASSESSMENT and PLAN  Diagnoses and all orders for this visit:    1.  Diabetic polyneuropathy associated with type 2 diabetes mellitus (HCC)  -     gabapentin (NEURONTIN) 100 mg capsule; Take 1 Capsule by mouth three (3) times daily. Max Daily Amount: 300 mg.  -     allopurinoL (ZYLOPRIM) 100 mg tablet; Take 2 Tablets by mouth daily. 2. Myalgia  -     gabapentin (NEURONTIN) 100 mg capsule; Take 1 Capsule by mouth three (3) times daily. Max Daily Amount: 300 mg.  -     allopurinoL (ZYLOPRIM) 100 mg tablet; Take 2 Tablets by mouth daily. 3. Idiopathic gout, unspecified chronicity, unspecified site  -     gabapentin (NEURONTIN) 100 mg capsule; Take 1 Capsule by mouth three (3) times daily. Max Daily Amount: 300 mg.  -     allopurinoL (ZYLOPRIM) 100 mg tablet; Take 2 Tablets by mouth daily. 4. Pain, joint, multiple sites  -     gabapentin (NEURONTIN) 100 mg capsule; Take 1 Capsule by mouth three (3) times daily. Max Daily Amount: 300 mg.  -     allopurinoL (ZYLOPRIM) 100 mg tablet; Take 2 Tablets by mouth daily. 5. Encounter to obtain excuse from work  -     gabapentin (NEURONTIN) 100 mg capsule; Take 1 Capsule by mouth three (3) times daily. Max Daily Amount: 300 mg.  -     allopurinoL (ZYLOPRIM) 100 mg tablet; Take 2 Tablets by mouth daily. 6. Gout, unspecified cause, unspecified chronicity, unspecified site  -     gabapentin (NEURONTIN) 100 mg capsule; Take 1 Capsule by mouth three (3) times daily. Max Daily Amount: 300 mg.  -     allopurinoL (ZYLOPRIM) 100 mg tablet; Take 2 Tablets by mouth daily. 7. Wrist pain, acute, right  -     gabapentin (NEURONTIN) 100 mg capsule; Take 1 Capsule by mouth three (3) times daily. Max Daily Amount: 300 mg.  -     allopurinoL (ZYLOPRIM) 100 mg tablet; Take 2 Tablets by mouth daily. 8. Type 2 diabetes with nephropathy (HCC)  -     gabapentin (NEURONTIN) 100 mg capsule; Take 1 Capsule by mouth three (3) times daily. Max Daily Amount: 300 mg.  -     allopurinoL (ZYLOPRIM) 100 mg tablet; Take 2 Tablets by mouth daily.     9. Acute idiopathic gout, unspecified site  -     gabapentin (NEURONTIN) 100 mg capsule; Take 1 Capsule by mouth three (3) times daily. Max Daily Amount: 300 mg.  -     allopurinoL (ZYLOPRIM) 100 mg tablet; Take 2 Tablets by mouth daily.     Patient advised to continue with allopurinol 200 mg daily so that the uric acid level will go down his normal state patient was told that the start of therapy so that recommendation would help his current condition      Secondary to the patient acute medical conditions, a letter on the pt's behalf was completed, pt's multiple questions answered to the best knowledge,  will keep a copy in the chart for further correspondence, patient was informed about the safety and  regulations regarding this condition patient was also advised to follow-up with HR for further guidelines patient acknowledged understanding and agreed with today's recommendations

## 2022-06-28 RX ORDER — CHLORTHALIDONE 25 MG/1
25 TABLET ORAL DAILY
Qty: 90 TABLET | Refills: 0 | Status: SHIPPED | OUTPATIENT
Start: 2022-06-28

## 2022-07-07 RX ORDER — TORSEMIDE 20 MG/1
TABLET ORAL
Qty: 90 TABLET | Refills: 0 | Status: SHIPPED | OUTPATIENT
Start: 2022-07-07

## 2022-07-07 RX ORDER — TORSEMIDE 20 MG/1
TABLET ORAL
Qty: 90 TABLET | Refills: 0 | Status: SHIPPED | OUTPATIENT
Start: 2022-07-07 | End: 2022-09-15

## 2022-09-15 RX ORDER — TORSEMIDE 20 MG/1
TABLET ORAL
Qty: 90 TABLET | Refills: 0 | Status: SHIPPED | OUTPATIENT
Start: 2022-09-15 | End: 2022-10-16 | Stop reason: SDUPTHER

## 2022-09-26 DIAGNOSIS — E78.2 MIXED HYPERLIPIDEMIA: ICD-10-CM

## 2022-09-26 DIAGNOSIS — I10 ESSENTIAL HYPERTENSION: ICD-10-CM

## 2022-09-26 DIAGNOSIS — E03.8 TSH (THYROID-STIMULATING HORMONE DEFICIENCY): ICD-10-CM

## 2022-09-26 DIAGNOSIS — E55.9 VITAMIN D DEFICIENCY: ICD-10-CM

## 2022-09-27 RX ORDER — METFORMIN HYDROCHLORIDE 1000 MG/1
TABLET ORAL
Qty: 180 TABLET | Refills: 0 | Status: SHIPPED | OUTPATIENT
Start: 2022-09-27

## 2022-10-17 RX ORDER — TORSEMIDE 20 MG/1
TABLET ORAL
Qty: 90 TABLET | Refills: 0 | Status: SHIPPED | OUTPATIENT
Start: 2022-10-17

## 2022-11-25 RX ORDER — TORSEMIDE 20 MG/1
TABLET ORAL
Qty: 90 TABLET | Refills: 0 | Status: SHIPPED | OUTPATIENT
Start: 2022-11-25

## 2022-12-23 NOTE — TELEPHONE ENCOUNTER
Last Visit: 6/10/22 with MD Lianet Sosa  Next Appointment: Jarvis Marie to follow-up in 3 months (9/10/22)  Previous Refill Encounter(s): 6/28/22 [facility] #90, 11/25/22 [facility] #90    Requested Prescriptions     Pending Prescriptions Disp Refills    chlorthalidone (HYGROTON) 25 mg tablet 30 Tablet 0     Sig: Take 1 Tablet by mouth daily. torsemide (DEMADEX) 20 mg tablet 90 Tablet 0     Sig: TAKE 3 TABLETS BY MOUTH ONCE DAILY . APPOINTMENT REQUIRED FOR FUTURE REFILLS         For Pharmacy Admin Tracking Only    Program: Medication Refill  CPA in place:   Recommendation Provided To:    Intervention Detail: New Rx: 2, reason: Patient Preference and Scheduled Appointment  Intervention Accepted By:   Kenya Christensen Closed?:   Time Spent (min): 5 Yes

## 2022-12-24 RX ORDER — TORSEMIDE 20 MG/1
TABLET ORAL
Qty: 90 TABLET | Refills: 0 | Status: SHIPPED | OUTPATIENT
Start: 2022-12-24

## 2022-12-24 RX ORDER — CHLORTHALIDONE 25 MG/1
25 TABLET ORAL DAILY
Qty: 30 TABLET | Refills: 0 | Status: SHIPPED | OUTPATIENT
Start: 2022-12-24

## 2022-12-27 RX ORDER — TORSEMIDE 20 MG/1
TABLET ORAL
Qty: 90 TABLET | Refills: 0 | Status: SHIPPED | OUTPATIENT
Start: 2022-12-27

## 2022-12-27 RX ORDER — CHLORTHALIDONE 25 MG/1
TABLET ORAL
Qty: 90 TABLET | Refills: 0 | Status: SHIPPED | OUTPATIENT
Start: 2022-12-27

## 2023-01-08 DIAGNOSIS — M10.00 ACUTE IDIOPATHIC GOUT, UNSPECIFIED SITE: ICD-10-CM

## 2023-01-09 RX ORDER — AMLODIPINE BESYLATE AND BENAZEPRIL HYDROCHLORIDE 10; 40 MG/1; MG/1
1 CAPSULE ORAL DAILY
Qty: 90 CAPSULE | Refills: 3 | OUTPATIENT
Start: 2023-01-09

## 2023-01-09 NOTE — TELEPHONE ENCOUNTER
Lotrel was sent on 5/27/22 for #90 with 3 refills      For Pharmacy Admin Tracking Only    Program: Medication Refill  CPA in place:   Recommendation Provided To:    Intervention Detail: New Rx: 1, reason: Patient Preference  Intervention Accepted By:   Divine Aguilar Closed?:   Time Spent (min): 5

## 2023-01-13 ENCOUNTER — VIRTUAL VISIT (OUTPATIENT)
Dept: FAMILY MEDICINE CLINIC | Age: 53
End: 2023-01-13
Payer: COMMERCIAL

## 2023-01-13 DIAGNOSIS — M25.562 CHRONIC PAIN OF BOTH KNEES: ICD-10-CM

## 2023-01-13 DIAGNOSIS — M10.069 ACUTE IDIOPATHIC GOUT OF KNEE, UNSPECIFIED LATERALITY: ICD-10-CM

## 2023-01-13 DIAGNOSIS — G89.29 CHRONIC PAIN OF BOTH KNEES: ICD-10-CM

## 2023-01-13 DIAGNOSIS — M25.561 CHRONIC PAIN OF BOTH KNEES: ICD-10-CM

## 2023-01-13 DIAGNOSIS — M25.50 PAIN, JOINT, MULTIPLE SITES: Primary | ICD-10-CM

## 2023-01-13 DIAGNOSIS — Z12.11 SCREENING FOR COLON CANCER: ICD-10-CM

## 2023-01-13 DIAGNOSIS — M25.562 ACUTE PAIN OF LEFT KNEE: ICD-10-CM

## 2023-01-13 RX ORDER — TRAMADOL HYDROCHLORIDE 50 MG/1
50 TABLET ORAL
Qty: 42 TABLET | Refills: 0 | Status: SHIPPED | OUTPATIENT
Start: 2023-01-13 | End: 2023-01-13 | Stop reason: CLARIF

## 2023-01-13 RX ORDER — TRAMADOL HYDROCHLORIDE 50 MG/1
50 TABLET ORAL
Qty: 42 TABLET | Refills: 0 | Status: SHIPPED | OUTPATIENT
Start: 2023-01-13 | End: 2023-01-27

## 2023-01-13 RX ORDER — FEBUXOSTAT 40 MG/1
40 TABLET, FILM COATED ORAL DAILY
Qty: 30 TABLET | Refills: 3 | Status: SHIPPED | OUTPATIENT
Start: 2023-01-13

## 2023-01-13 NOTE — PROGRESS NOTES
HISTORY OF PRESENT ILLNESS  Susan Velasquez is a 45 y.o. male,  with history of gout  and Diabetic state, last uric acid was elevated most recently received acid was 13.7, went to pt first colchicine and indomethacin, patient is currently not compliant with the gout medication patient has had an attack which responded nicely to given medication on the last visit, the pain and the swelling has subsided significantly pain is 8 out of 10 dull nonradiating improved since last visit, bria took some Felecia Peto despite its side effects, unalbe to walk on his knees, so that his boss allowed him to work from home till the pain gone away,     Current Outpatient Medications   Medication Sig Dispense Refill    chlorthalidone (HYGROTON) 25 mg tablet Take 1 tablet by mouth once daily 90 Tablet 0    torsemide (DEMADEX) 20 mg tablet TAKE 3 TABLETS BY MOUTH ONCE DAILY . APPOINTMENT REQUIRED FOR FUTURE REFILLS 90 Tablet 0    metFORMIN (GLUCOPHAGE) 1,000 mg tablet TAKE 1 TABLET BY MOUTH TWICE DAILY WITH MEALS 180 Tablet 0    gabapentin (NEURONTIN) 100 mg capsule Take 1 Capsule by mouth three (3) times daily. Max Daily Amount: 300 mg. 90 Capsule 2    allopurinoL (ZYLOPRIM) 100 mg tablet Take 2 Tablets by mouth daily. 60 Tablet 2    amLODIPine-benazepril (LOTREL) 10-40 mg per capsule Take 1 Capsule by mouth daily. 30 Capsule 0    cyanocobalamin 1,000 mcg tablet Take 1,000 mcg by mouth daily. 10 Mg       MAGNESIUM PO Take  by mouth. flash glucose sensor (FreeStyle Daniel 2 Sensor) kit Tid prn 1 Kit 3    betamethasone dipropionate (DIPROSONE) 0.05 % topical cream Apply  to affected area two (2) times a day. 45 g 5    ergocalciferol (ERGOCALCIFEROL) 1,250 mcg (50,000 unit) capsule Take 1 Capsule by mouth every seven (7) days. 12 Capsule 3    glipiZIDE (GLUCOTROL) 10 mg tablet TAKE 1 & 1/2 (ONE & ONE-HALF) TABLETS BY MOUTH TWICE DAILY 270 Tablet 4    metOLazone (ZAROXOLYN) 5 mg tablet Take 2 Tablets by mouth daily.  180 Tablet 1    potassium chloride (K-DUR, KLOR-CON) 20 mEq tablet Take 1 Tablet by mouth three (3) times daily. Indications: low amount of potassium in the blood 270 Tablet 2    clobetasoL (TEMOVATE) 0.05 % topical cream Apply  to affected area two (2) times a day. 60 g 3    ketoconazole (NIZORAL) 2 % topical cream APPLY TO THE AFFECTED AREA(S) TWICE DAILY 60 g 0    aspirin 81 mg tablet Take 1 Tab by mouth daily. 30 Tab 11    glucose blood VI test strips (ASCENSIA AUTODISC VI, ONE TOUCH ULTRA TEST VI) strip by Does Not Apply route. As directed 1 Package 11    Lancets Misc by Does Not Apply route. As directed   1 Package 11    empagliflozin-linagliptin (Glyxambi) 25-5 mg tab Take 1 Tablet by mouth daily. 90 Tablet 3    guaiFENesin ER (MUCINEX) 600 mg ER tablet Take 1 Tablet by mouth two (2) times a day. (Patient not taking: Reported on 1/13/2023) 20 Tablet 0    diclofenac (VOLTAREN) 1 % gel Apply 2 g to affected area four (4) times daily. (Patient taking differently: Apply 2 g to affected area four (4) times daily. As needed) 100 g 2     Allergies   Allergen Reactions    Statins-Hmg-Coa Reductase Inhibitors Myalgia    Wheat Hives     Past Medical History:   Diagnosis Date    Acute pain of left knee 8/21/2017    Acute pain of left knee 8/21/2017    Diabetes mellitus type II, uncontrolled 8/8/2014    Diabetic polyneuropathy associated with type 2 diabetes mellitus ([facility] 11/22/2016    [facility] 6/15/2010    GERD (gastroesophageal reflux disease) 2/21/2014    Hypercholesterolemia 6/15/2010    Hypertension     Prostate infarction 3/28/2016    Screening for depression 3/30/2014    Tinea cruris 3/13/2014    Urinary frequency 3/28/2016     No past surgical history on file.   Family History   Problem Relation Age of Onset    Lung Disease Mother     Hypertension Father     Heart Disease Father      Social History     Tobacco Use    Smoking status: Never    Smokeless tobacco: Never   Substance Use Topics    Alcohol use: No      Lab Results   Component Value Date/Time    Hemoglobin A1c 9.1 (H) 06/18/2018 11:35 AM    Hemoglobin A1c 8.6 (H) 08/08/2014 12:18 PM    Hemoglobin A1c 8.6 (H) 02/21/2014 10:04 AM    Glucose 87 05/23/2022 12:00 AM    Glucose (POC) 292 (H) 06/15/2010 05:14 AM    Glucose POC 205mg/dl 06/15/2010 03:13 PM    Microalbumin/Creat ratio (mg/g creat) 616 (H) 11/15/2021 11:01 AM    Microalbumin,urine random 56.50 11/15/2021 11:01 AM    LDL, calculated 88.2 11/15/2021 11:01 AM    Creatinine 1.70 (H) 05/23/2022 12:00 AM      Lab Results   Component Value Date/Time    Cholesterol, total 155 11/15/2021 11:01 AM    HDL Cholesterol 50 11/15/2021 11:01 AM    LDL, calculated 88.2 11/15/2021 11:01 AM    Triglyceride 84 11/15/2021 11:01 AM    CHOL/HDL Ratio 3.1 11/15/2021 11:01 AM        Review of Systems   Constitutional:  Negative for chills and fever. HENT:  Negative for congestion and nosebleeds. Eyes:  Negative for blurred vision and pain. Respiratory:  Negative for cough, shortness of breath and wheezing. Cardiovascular:  Negative for chest pain and leg swelling. Gastrointestinal:  Negative for constipation, diarrhea, nausea and vomiting. Genitourinary:  Negative for dysuria and frequency. Musculoskeletal:  Negative for joint pain and myalgias. Skin:  Negative for itching and rash. Neurological:  Negative for dizziness, loss of consciousness and headaches. Psychiatric/Behavioral:  Negative for depression. The patient is not nervous/anxious and does not have insomnia. Physical Exam  Constitutional:       Appearance: Normal appearance. HENT:      Head: Normocephalic and atraumatic. Nose: Nose normal. No congestion. Neurological:      Mental Status: He is alert and oriented to person, place, and time. Psychiatric:         Mood and Affect: Mood normal.         Behavior: Behavior normal.         Thought Content:  Thought content normal.         Judgment: Judgment normal.       ASSESSMENT and PLAN ICD-10-CM ICD-9-CM    1. Pain, joint, multiple sites  M25.50 719.49 febuxostat (Uloric) 40 mg tab tablet      traMADoL (ULTRAM) 50 mg tablet      2. Acute pain of left knee  M25.562 719.46 febuxostat (Uloric) 40 mg tab tablet      traMADoL (ULTRAM) 50 mg tablet      3. Chronic pain of both knees  M25.561 719.46 febuxostat (Uloric) 40 mg tab tablet    M25.562 338.29 traMADoL (ULTRAM) 50 mg tablet    G89.29        4. Acute idiopathic gout of knee, unspecified laterality  M10.069 274.01 febuxostat (Uloric) 40 mg tab tablet      traMADoL (ULTRAM) 50 mg tablet      At this time patient was told to avoid taking ibuprofen due to diabetic state patient is not a good candidate for steroid therapy due to uncontrolled diabetic state, was told to call if not better   was told to help with weight reduction,   massage therapy, exercise therapy, to remain active but to avoid heavy lifting and pushing at this time for the next 6 weeks ,   Ice therapy 2-30min tid daily,   meds side effects and compliancy advised,  Call or rtc if worsens,   Pt agreed with today's recommendations.     reviewed diet, exercise and weight control

## 2023-01-13 NOTE — PROGRESS NOTES
Chief Complaint   Patient presents with    Knee Pain     Having knee pain, trouble walking, seen at  [facility] , dx with possible Gout and treated with indomethacin and colchicine. 1. Have you been to the ER, urgent care clinic since your last visit? Hospitalized since your last visit? Yes When: 1/2023  Where: [facility]  Reason for visit: knee pain    2. Have you seen or consulted any other health care providers outside of the [address] [place] [address] since your last visit? Include any pap smears or colon screening. No    Call placed to pt. Verified patient with two type of identifiers.

## 2023-01-13 NOTE — LETTER
NOTIFICATION RETURN TO WORK / SCHOOL    1/13/2023 2:29 PM    Mr. Kayley Yu  [facility] [facility] [address]      To Whom It May Concern:    Kayley Yu is currently under the care of [address] OFF[facility]. He will return to work/school on: 1/25/2023 due to his serious medical condition. If there are questions or concerns please have the patient contact our office.         Sincerely,      Aaron Cameron MD

## 2023-01-23 DIAGNOSIS — M25.561 CHRONIC PAIN OF BOTH KNEES: ICD-10-CM

## 2023-01-23 DIAGNOSIS — Z12.11 SCREENING FOR COLON CANCER: ICD-10-CM

## 2023-01-23 DIAGNOSIS — M25.50 PAIN, JOINT, MULTIPLE SITES: ICD-10-CM

## 2023-01-23 DIAGNOSIS — M10.069 ACUTE IDIOPATHIC GOUT OF KNEE, UNSPECIFIED LATERALITY: ICD-10-CM

## 2023-01-23 DIAGNOSIS — G89.29 CHRONIC PAIN OF BOTH KNEES: ICD-10-CM

## 2023-01-23 DIAGNOSIS — M25.562 ACUTE PAIN OF LEFT KNEE: ICD-10-CM

## 2023-01-23 DIAGNOSIS — M25.562 CHRONIC PAIN OF BOTH KNEES: ICD-10-CM

## 2023-01-23 DIAGNOSIS — R60.0 BILATERAL EDEMA OF LOWER EXTREMITY: ICD-10-CM

## 2023-01-23 NOTE — TELEPHONE ENCOUNTER
Last Visit: 1/13/23 with MD Rohit Valencia  Next Appointment: 4/4/23 with MD Rohit Valencia  Previous Refill Encounter(s): 11/15/21 [facility] #270 with 2 refills, 1/13/23 Ultram #42    Requested Prescriptions     Pending Prescriptions Disp Refills    potassium chloride (K-DUR, KLOR-CON M20) 20 mEq tablet 270 Tablet 2     Sig: Take 1 Tablet by mouth three (3) times daily. Indications: low amount of potassium in the blood    traMADoL (ULTRAM) 50 mg tablet 42 Tablet 0     Sig: Take 1 Tablet by mouth every six (6) hours as needed for Pain for up to 14 days. Max Daily Amount: 200 mg. For Pharmacy Admin Tracking Only    Program: Medication Refill  CPA in place:   Recommendation Provided To:    Intervention Detail: New Rx: 2, reason: Patient Preference  Intervention Accepted By:   Annie Seymour Closed?:   Time Spent (min): 5

## 2023-01-25 RX ORDER — TORSEMIDE 20 MG/1
TABLET ORAL
Qty: 90 TABLET | Refills: 1 | Status: SHIPPED | OUTPATIENT
Start: 2023-01-25

## 2023-01-25 RX ORDER — POTASSIUM CHLORIDE 20 MEQ/1
20 TABLET, EXTENDED RELEASE ORAL 3 TIMES DAILY
Qty: 270 TABLET | Refills: 2 | Status: SHIPPED | OUTPATIENT
Start: 2023-01-25

## 2023-01-25 RX ORDER — TRAMADOL HYDROCHLORIDE 50 MG/1
50 TABLET ORAL
Qty: 42 TABLET | Refills: 0 | Status: SHIPPED | OUTPATIENT
Start: 2023-01-25 | End: 2023-02-08

## 2023-01-25 RX ORDER — CHLORTHALIDONE 25 MG/1
25 TABLET ORAL DAILY
Qty: 90 TABLET | Refills: 0 | Status: SHIPPED | OUTPATIENT
Start: 2023-01-25

## 2023-02-03 ENCOUNTER — TELEPHONE (OUTPATIENT)
Dept: FAMILY MEDICINE CLINIC | Age: 53
End: 2023-02-03

## 2023-02-03 NOTE — TELEPHONE ENCOUNTER
----- Message from Aby Vergara sent at 2/3/2023 12:37 PM EST -----  Subject: Message to Provider    QUESTIONS  Information for Provider? Cedric Ohio is calling about prior auth   for Tramadol. Received request , but no medical notes were faxed. Please   fax to [phone] ([phone] option 3  ---------------------------------------------------------------------------  --------------  Coty oMbley INFO  [phone]; OK to leave message on voicemail  ---------------------------------------------------------------------------  --------------  SCRIPT ANSWERS  Relationship to Patient? Third Party  Third Party Type? Insurance? Representative Name?  Cedric Ohio

## 2023-02-06 ENCOUNTER — VIRTUAL VISIT (OUTPATIENT)
Dept: FAMILY MEDICINE CLINIC | Age: 53
End: 2023-02-06
Payer: COMMERCIAL

## 2023-02-06 DIAGNOSIS — M25.561 CHRONIC PAIN OF BOTH KNEES: ICD-10-CM

## 2023-02-06 DIAGNOSIS — G89.29 CHRONIC PAIN OF BOTH KNEES: ICD-10-CM

## 2023-02-06 DIAGNOSIS — E11.8 TYPE II DIABETES MELLITUS WITH COMPLICATION (HCC): ICD-10-CM

## 2023-02-06 DIAGNOSIS — M79.605 PAIN IN BOTH LOWER EXTREMITIES: ICD-10-CM

## 2023-02-06 DIAGNOSIS — M25.50 PAIN, JOINT, MULTIPLE SITES: ICD-10-CM

## 2023-02-06 DIAGNOSIS — M79.604 PAIN IN BOTH LOWER EXTREMITIES: ICD-10-CM

## 2023-02-06 DIAGNOSIS — R60.0 EDEMA LEG: Primary | ICD-10-CM

## 2023-02-06 DIAGNOSIS — Z02.89 ENCOUNTER TO OBTAIN EXCUSE FROM WORK: ICD-10-CM

## 2023-02-06 DIAGNOSIS — M25.562 CHRONIC PAIN OF BOTH KNEES: ICD-10-CM

## 2023-02-06 RX ORDER — TRAMADOL HYDROCHLORIDE 50 MG/1
50 TABLET ORAL
Qty: 42 TABLET | Refills: 0 | Status: SHIPPED | OUTPATIENT
Start: 2023-02-06 | End: 2023-02-20

## 2023-02-06 RX ORDER — FEBUXOSTAT 80 MG/1
80 TABLET, FILM COATED ORAL DAILY
Qty: 30 TABLET | Refills: 2 | Status: SHIPPED | OUTPATIENT
Start: 2023-02-06

## 2023-02-06 RX ORDER — PREGABALIN 75 MG/1
75 CAPSULE ORAL 2 TIMES DAILY
Qty: 60 CAPSULE | Refills: 2 | Status: SHIPPED | OUTPATIENT
Start: 2023-02-06

## 2023-02-06 NOTE — PROGRESS NOTES
Elizabeth Tarango (: 1970) is a 45 y.o. male, established patient, here for evaluation of the following chief complaint(s): Foot Pain (Stated foot pain not getting better,  taking tramadol with very minimal relief.)       B/l leg and foot pain  Has been bothering the patient for few wks, patient was diabetic state last hemoglobin A1c was 7.7 percentile has been doing home monitoring averaging 90 -140, denies any high numbers more than 200 denies any low numbers less than 70, and has been compliant with his diabetic medication with a complaint of bilateral tingling sensation patient used to take gabapentin unfortunately ran out and never replaced it today present with bilateral leg and foot pain stating that he has to use a cane for mobility and he has not been able to get out of the house and do walking due to the pain recently. Multiple recurrent gout attacks patient was started on gout medication last uric acid level was abnormal patient has been given 40 mg of Uloric has been compliant and being given tramadol for pain control          Has no history of being flat-footed patient does have history of long periods of standing and walking on hard concrete floor, currently has no calluses or plantar warts, unfortunately patient has abnormal BMI the pain is worsened with walking more than 1-2 blocks, and going up and down the steps and worsening since onset. Patient states that it is a dull nonradiating no numbness ++++ tingling sensation disabling, morning stiffness which gets better with activity and with the severity of 8/10. So for patient denies any history of extremity trauma, and has no leg swelling no skin lesion noted.     Went to ER was checked for DVT with neg results,   Was seen ophthalmologist was told to have some blood int he back of the eye, was told that it could be HTN, or stroke went to ER was told not to be no CVA, does home monitoring 127/85,   Has been taking a lot of motrin for the pain, Unable to attend to walk for the last 2 weeks, needs to be accommodated to work note to work virtually,     Constitutional: no chills and fever,nad     HENT: no ear pain and nosebleeds. No blurred vision,   Respiratory: no shortness of breath, wheezing cough nor sore throat. Cardiovascular: Has no chest pain, leg swelling ,and racing heart . Gastrointestinal: No constipation, diarrhea, nausea and vomiting. Genitourinary: No frequency. Musculoskeletal: +++for multiple joint pain. Skin: no itching, pimples   Neurological: Negative for dizziness, no tremors  Psychiatric/Behavioral: Negative for depression,  not nervous/anxious. General: alert, cooperative, no distress   Mental  status: normal mood, behavior, speech, dress, motor activity, and thought processes, able to follow commands   HENT: NCAT   Neck: no visualized mass   Resp: no respiratory distress   Neuro: no gross deficits   Skin: no discoloration or lesions of concern on visible areas   Psychiatric: normal affect, consistent with stated mood, no evidence of hallucinations         ASSESSMENT/PLAN:  Below is the assessment and plan developed based on review of pertinent history, labs, studies, and medications. 1. Edema leg  -     febuxostat (Uloric) 80 mg tab tablet; Take 1 Tablet by mouth daily. , Normal, Disp-30 Tablet, R-2  -     pregabalin (LYRICA) 75 mg capsule; Take 1 Capsule by mouth two (2) times a day. Max Daily Amount: 150 mg., Normal, Disp-60 Capsule, R-2  -     REFERRAL TO LYMPHEDEMA CLINIC  -     traMADoL (ULTRAM) 50 mg tablet; Take 1 Tablet by mouth every six (6) hours as needed for Pain for up to 14 days. Max Daily Amount: 200 mg., Normal, Disp-42 Tablet, R-0  2. Type II diabetes mellitus with complication (HCC)  -     febuxostat (Uloric) 80 mg tab tablet; Take 1 Tablet by mouth daily. , Normal, Disp-30 Tablet, R-2  -     pregabalin (LYRICA) 75 mg capsule; Take 1 Capsule by mouth two (2) times a day.  Max Daily Amount: 150 mg., Normal, Disp-60 Capsule, R-2  -     REFERRAL TO LYMPHEDEMA CLINIC  -     traMADoL (ULTRAM) 50 mg tablet; Take 1 Tablet by mouth every six (6) hours as needed for Pain for up to 14 days. Max Daily Amount: 200 mg., Normal, Disp-42 Tablet, R-0  3. Pain, joint, multiple sites  -     febuxostat (Uloric) 80 mg tab tablet; Take 1 Tablet by mouth daily. , Normal, Disp-30 Tablet, R-2  -     pregabalin (LYRICA) 75 mg capsule; Take 1 Capsule by mouth two (2) times a day. Max Daily Amount: 150 mg., Normal, Disp-60 Capsule, R-2  -     REFERRAL TO LYMPHEDEMA CLINIC  -     traMADoL (ULTRAM) 50 mg tablet; Take 1 Tablet by mouth every six (6) hours as needed for Pain for up to 14 days. Max Daily Amount: 200 mg., Normal, Disp-42 Tablet, R-0  4. Chronic pain of both knees  -     febuxostat (Uloric) 80 mg tab tablet; Take 1 Tablet by mouth daily. , Normal, Disp-30 Tablet, R-2  -     pregabalin (LYRICA) 75 mg capsule; Take 1 Capsule by mouth two (2) times a day. Max Daily Amount: 150 mg., Normal, Disp-60 Capsule, R-2  -     REFERRAL TO LYMPHEDEMA CLINIC  -     traMADoL (ULTRAM) 50 mg tablet; Take 1 Tablet by mouth every six (6) hours as needed for Pain for up to 14 days. Max Daily Amount: 200 mg., Normal, Disp-42 Tablet, R-0  5. Pain in both lower extremities  -     febuxostat (Uloric) 80 mg tab tablet; Take 1 Tablet by mouth daily. , Normal, Disp-30 Tablet, R-2  -     pregabalin (LYRICA) 75 mg capsule; Take 1 Capsule by mouth two (2) times a day. Max Daily Amount: 150 mg., Normal, Disp-60 Capsule, R-2  -     REFERRAL TO LYMPHEDEMA CLINIC  -     traMADoL (ULTRAM) 50 mg tablet; Take 1 Tablet by mouth every six (6) hours as needed for Pain for up to 14 days. Max Daily Amount: 200 mg., Normal, Disp-42 Tablet, R-0  6. Encounter to obtain excuse from work  -     febuxostat (Uloric) 80 mg tab tablet; Take 1 Tablet by mouth daily. , Normal, Disp-30 Tablet, R-2  -     pregabalin (LYRICA) 75 mg capsule; Take 1 Capsule by mouth two (2) times a day. Max Daily Amount: 150 mg., Normal, Disp-60 Capsule, R-2  -     REFERRAL TO LYMPHEDEMA CLINIC  -     traMADoL (ULTRAM) 50 mg tablet; Take 1 Tablet by mouth every six (6) hours as needed for Pain for up to 14 days. Max Daily Amount: 200 mg., Normal, Disp-42 Tablet, R-0    No follow-ups on file. Today patient was offered to be seen by lymphedema clinic unfortunately opted to stated he is hoping that he should be gone in couple of weeks,      With risk-benefit analysis, cs med was prescribed and was told to be considering available nonpharmacologic,   Patient was told that the medication is not safe was told not to operate any machinery while taking the medication meanwhile emphasized that the pt should take the medication as needed not on a regular basis avoid alcohol intake and avoid other medication that could potentiate its effect, regardless patient was told any other medication given by any other doctor the pt need to call primary care for further advice`  Signed informed consent,   signed cs treatment agreement,   patient acknowledged understanding and agreed with today's recommendation           Sushant Yip, was evaluated through a synchronous (real-time) audio-video encounter. The patient (or guardian if applicable) is aware that this is a billable service, which includes applicable co-pays. This Virtual Visit was conducted with patient's (and/or legal guardian's) consent. The visit was conducted pursuant to the emergency declaration under the [facility], [address] [place] [address] wa[facility] for Children authority and the [employer] General Act. Patient identification was verified, and a caregiver was present when appropriate. The patient was located at: Home: [address] [place] [address]  The provider was located at:  Facility (Appt Department): [address] [place] [phone]       An electronic signature was used to authenticate this note.  -- Goldie Moritz, MD

## 2023-02-06 NOTE — PROGRESS NOTES
Chief Complaint   Patient presents with    Foot Pain     Stated foot pain not getting better,  taking tramadol with very minimal relief. 1. Have you been to the ER, urgent care clinic since your last visit? Hospitalized since your last visit? No    2. Have you seen or consulted any other health care providers outside of the [address] [place] [address] since your last visit? Include any pap smears or colon screening. No    Call placed to pt. Verified patient with two type of identifiers.

## 2023-02-06 NOTE — LETTER
NOTIFICATION RETURN TO WORK / SCHOOL        2/6/2023 2:00 PM    Mr. Eliezer Ruiz  [facility] [facility] [address]      To Whom It May Concern:    Eliezer Ruiz is currently under the care of [address] [facility]. Patient will need to be accommodated for work for the next 4 weeks to work from home and perform     virtually due to the current medical condition. If there are questions or concerns please have the patient contact our office.         Sincerely,      Reji Elkins MD

## 2023-02-21 RX ORDER — GLIPIZIDE 10 MG/1
15 TABLET ORAL 2 TIMES DAILY
Qty: 270 TABLET | Refills: 3 | Status: SHIPPED | OUTPATIENT
Start: 2023-02-21

## 2023-02-24 ENCOUNTER — VIRTUAL VISIT (OUTPATIENT)
Dept: FAMILY MEDICINE CLINIC | Age: 53
End: 2023-02-24
Payer: COMMERCIAL

## 2023-02-24 DIAGNOSIS — G89.29 CHRONIC PAIN OF BOTH KNEES: ICD-10-CM

## 2023-02-24 DIAGNOSIS — E78.2 MIXED HYPERLIPIDEMIA: ICD-10-CM

## 2023-02-24 DIAGNOSIS — M25.562 ACUTE PAIN OF LEFT KNEE: ICD-10-CM

## 2023-02-24 DIAGNOSIS — M25.561 CHRONIC PAIN OF BOTH KNEES: ICD-10-CM

## 2023-02-24 DIAGNOSIS — E03.8 TSH (THYROID-STIMULATING HORMONE DEFICIENCY): ICD-10-CM

## 2023-02-24 DIAGNOSIS — M79.605 PAIN IN BOTH LOWER EXTREMITIES: Primary | ICD-10-CM

## 2023-02-24 DIAGNOSIS — M25.50 PAIN, JOINT, MULTIPLE SITES: ICD-10-CM

## 2023-02-24 DIAGNOSIS — Z12.11 SCREENING FOR COLON CANCER: ICD-10-CM

## 2023-02-24 DIAGNOSIS — M10.069 ACUTE IDIOPATHIC GOUT OF KNEE, UNSPECIFIED LATERALITY: ICD-10-CM

## 2023-02-24 DIAGNOSIS — M25.562 CHRONIC PAIN OF BOTH KNEES: ICD-10-CM

## 2023-02-24 DIAGNOSIS — I10 ESSENTIAL HYPERTENSION: ICD-10-CM

## 2023-02-24 DIAGNOSIS — M79.604 PAIN IN BOTH LOWER EXTREMITIES: Primary | ICD-10-CM

## 2023-02-24 DIAGNOSIS — E55.9 VITAMIN D DEFICIENCY: ICD-10-CM

## 2023-02-24 RX ORDER — TRAMADOL HYDROCHLORIDE 50 MG/1
50 TABLET ORAL
Qty: 42 TABLET | Refills: 0 | Status: SHIPPED | OUTPATIENT
Start: 2023-02-24 | End: 2023-03-10

## 2023-02-24 RX ORDER — METFORMIN HYDROCHLORIDE 1000 MG/1
TABLET ORAL
Qty: 180 TABLET | Refills: 1 | Status: SHIPPED | OUTPATIENT
Start: 2023-02-24

## 2023-02-24 RX ORDER — AMLODIPINE BESYLATE AND BENAZEPRIL HYDROCHLORIDE 10; 40 MG/1; MG/1
1 CAPSULE ORAL DAILY
Qty: 90 CAPSULE | Refills: 1 | Status: SHIPPED | OUTPATIENT
Start: 2023-02-24

## 2023-02-24 RX ORDER — FEBUXOSTAT 80 MG/1
80 TABLET, FILM COATED ORAL DAILY
Qty: 90 TABLET | Refills: 3 | Status: SHIPPED | OUTPATIENT
Start: 2023-02-24

## 2023-02-24 NOTE — PROGRESS NOTES
Jill Hand (: 1970) is a 45 y.o. male, established patient, here for evaluation of the following chief complaint(s):   Gout (Follow up-  stated pain is better but still there )     who present for f/u regarding the diabetic state, ++ Rf needed for today, patient currently denies tingling sensation, has no polyurea and polydipsia, last a1c was not at target,      Patient with history of gout last uric acid was elevated most recently received acid was 13.7, patient is currently not compliant with the gout medication patient has had an attack which responded nicely to given medication on the last visit, the pain and the swelling has subsided significantly pain is 9 out of 10 dull nonradiating improved since last visit        Constitutional: no chills and fever,nad     HENT: no ear pain and nosebleeds. No blurred vision,   Respiratory: no shortness of breath, wheezing cough nor sore throat. Cardiovascular: Has no chest pain, leg swelling ,and racing heart . Gastrointestinal: No constipation, diarrhea, nausea and vomiting. Genitourinary: No frequency. Musculoskeletal: +++for multiple joint pain. Skin: no itching, pimples   Neurological: Negative for dizziness, no tremors  Psychiatric/Behavioral: Negative for depression,  not nervous/anxious. General: alert, cooperative, no distress   Mental  status: normal mood, behavior, speech, dress, motor activity, and thought processes, able to follow commands   HENT: NCAT   Neck: no visualized mass   Resp: no respiratory distress   Neuro: no gross deficits   Skin: no discoloration or lesions of concern on visible areas   Psychiatric: normal affect, consistent with stated mood, no evidence of hallucinations              ASSESSMENT/PLAN:  Below is the assessment and plan developed based on review of pertinent history, labs, studies, and medications. 1. Pain in both lower extremities  -     DUPLEX LOWER EXT VENOUS BILAT; Future  2.  Pain, joint, multiple sites  -     febuxostat (Uloric) 80 mg tab tablet; Take 1 Tablet by mouth daily. , Normal, Disp-90 Tablet, R-3  -     traMADoL (ULTRAM) 50 mg tablet; Take 1 Tablet by mouth every six (6) hours as needed for Pain for up to 14 days. Max Daily Amount: 200 mg., Normal, Disp-42 Tablet, R-0  3. Acute pain of left knee  -     febuxostat (Uloric) 80 mg tab tablet; Take 1 Tablet by mouth daily. , Normal, Disp-90 Tablet, R-3  -     traMADoL (ULTRAM) 50 mg tablet; Take 1 Tablet by mouth every six (6) hours as needed for Pain for up to 14 days. Max Daily Amount: 200 mg., Normal, Disp-42 Tablet, R-0  4. Chronic pain of both knees  -     febuxostat (Uloric) 80 mg tab tablet; Take 1 Tablet by mouth daily. , Normal, Disp-90 Tablet, R-3  -     traMADoL (ULTRAM) 50 mg tablet; Take 1 Tablet by mouth every six (6) hours as needed for Pain for up to 14 days. Max Daily Amount: 200 mg., Normal, Disp-42 Tablet, R-0  5. Acute idiopathic gout of knee, unspecified laterality  -     febuxostat (Uloric) 80 mg tab tablet; Take 1 Tablet by mouth daily. , Normal, Disp-90 Tablet, R-3  -     traMADoL (ULTRAM) 50 mg tablet; Take 1 Tablet by mouth every six (6) hours as needed for Pain for up to 14 days. Max Daily Amount: 200 mg., Normal, Disp-42 Tablet, R-0  6. TSH (thyroid-stimulating hormone deficiency)  -     metFORMIN (GLUCOPHAGE) 1,000 mg tablet; TAKE 1 TABLET BY MOUTH TWICE DAILY WITH MEALS, Normal, Disp-180 Tablet, R-1  7. Vitamin D deficiency  -     metFORMIN (GLUCOPHAGE) 1,000 mg tablet; TAKE 1 TABLET BY MOUTH TWICE DAILY WITH MEALS, Normal, Disp-180 Tablet, R-1  8. Mixed hyperlipidemia  -     metFORMIN (GLUCOPHAGE) 1,000 mg tablet; TAKE 1 TABLET BY MOUTH TWICE DAILY WITH MEALS, Normal, Disp-180 Tablet, R-1  9. Essential hypertension  -     metFORMIN (GLUCOPHAGE) 1,000 mg tablet; TAKE 1 TABLET BY MOUTH TWICE DAILY WITH MEALS, Normal, Disp-180 Tablet, R-1  10. Screening for colon cancer  -     traMADoL (ULTRAM) 50 mg tablet;  Take 1 Tablet by mouth every six (6) hours as needed for Pain for up to 14 days. Max Daily Amount: 200 mg., Normal, Disp-42 Tablet, R-0    No follow-ups on file. With risk-benefit analysis, cs med was prescribed and was told to be considering available nonpharmacologic,   Patient was told that the medication is not safe was told not to operate any machinery while taking the medication meanwhile emphasized that the pt should take the medication as needed not on a regular basis avoid alcohol intake and avoid other medication that could potentiate its effect, regardless patient was told any other medication given by any other doctor the pt need to call primary care for further advice`      patient acknowledged understanding and agreed with today's recommendation          Kiko Hayden, was evaluated through a synchronous (real-time) audio-video encounter. The patient (or guardian if applicable) is aware that this is a billable service, which includes applicable co-pays. This Virtual Visit was conducted with patient's (and/or legal guardian's) consent. The visit was conducted pursuant to the emergency declaration under the [address] [place] [address], [address] [place] [address] authority and the [employer] General Act. Patient identification was verified, and a caregiver was present when appropriate. The patient was located at: Home: [address] [place] [address]  The provider was located at: Facility (Appt Department): [address] [place] [id number]       An electronic signature was used to authenticate this note.   -- Liban Carter MD

## 2023-04-04 ENCOUNTER — OFFICE VISIT (OUTPATIENT)
Dept: FAMILY MEDICINE CLINIC | Age: 53
End: 2023-04-04

## 2023-04-04 RX ORDER — FLUOCINONIDE 0.5 MG/G
CREAM TOPICAL
Start: 2023-02-27

## 2023-04-04 NOTE — PROGRESS NOTES
Identified pt with two pt identifiers(name and ). Reviewed record in preparation for visit and have obtained necessary documentation. Chief Complaint   Patient presents with    Annual Wellness Visit        Vitals:    23 1025   BP: 121/73   Pulse: (!) 56   Resp: 18   Temp: 98.4 °F (36.9 °C)   TempSrc: Oral   SpO2: 97%   Weight: (!) 379 lb (171.9 kg)   Height: 6' 1\" (1.854 m)       Health Maintenance Due   Topic    Eye Exam Retinal or Dilated     COVID-19 Vaccine (4 - Booster for Pfizer series)    Diabetic Alb to Cr ratio (uACR) test     Foot Exam Q1     Lipid Screen        Coordination of Care Questionnaire:  :   1) Have you been to an emergency room, urgent care, or hospitalized since your last visit? If yes, where when, and reason for visit? no       2. Have seen or consulted any other health care provider since your last visit? If yes, where when, and reason for visit? NO      Patient is accompanied by self I have received verbal consent from Danielle Buitrago to discuss any/all medical information while they are present in the room.

## 2023-04-11 NOTE — PROGRESS NOTES
Keatonlistzeus Irwin (: 1970) is a 45 y.o. male, established patient, here for evaluation of the following chief complaint(s): Annual Wellness Visit  Patient presents stating that he just lost his insurance coverage he is self-pay he already paid $50 for this visit he will await full coverage for his physical today present for blood pressure check   present for Bp check , compliant w/ the bp meds, a low salt diet, an  active life style, patient does obtain the bp at home and the average of  <140/90,he denies Chest Pain, has no legs swelling no lightheadedness, in addition patient also denies any racing heart palpitation at this visit,  the pat has not been feeling anxious, and  Has not been feeling stressed out, otherwise feeling better since the last visit        Constitutional: no chills and fever,  , nad     HENT: no ear pain or nosebleeds. No blurred vision  Respiratory: no shortness of breath, wheezing cough   Cardiovascular: Has no chest pain, ,and racing heart . Gastrointestinal: No constipation, diarrhea, nausea and vomiting. Genitourinary: No frequency. Musculoskeletal: Negative for joint pain. Skin: no itching, no rash. Neurological: Negative for dizziness, no tremors  Psychiatric/Behavioral: Negative for depression   is not nervous/anxious. and not depressed the patient is not nervous/anxious.         General:  alert cooperative appears stated for the age  [de-identified]; normocephalic and atraumatic PERRLA extraocular motor intact normal tympanic membrane normal external ear bilaterally, mucosal normal no drainage  Neck: supple no adenopathy no JVD no bruit  Lungs: Clear to auscultation bilaterally no rales rhonchi or wheezes  Heart: Normal regular S1-S2 ,  no murmur  Breast exam deferred  Abdomen: Soft nontender normal bowel sounds   Extremities: Normal range of motion no point tenderness normal pulses no edema  Pelvic/: No lesion, no lymphadenopathy  Skin: Normal no lesion no rash        ASSESSMENT/PLAN:  Below is the assessment and plan developed based on review of pertinent history, physical exam, labs, studies, and medications. 1. TSH (thyroid-stimulating hormone deficiency)  -     CBC W/O DIFF; Future  -     LIPID PANEL; Future  -     HEMOGLOBIN A1C WITH EAG; Future  -     TSH 3RD GENERATION; Future  -     METABOLIC PANEL, COMPREHENSIVE; Future  -     URIC ACID; Future  2. Type 2 diabetes with nephropathy (HCC)  -     CBC W/O DIFF; Future  -     LIPID PANEL; Future  -     HEMOGLOBIN A1C WITH EAG; Future  -     TSH 3RD GENERATION; Future  -     METABOLIC PANEL, COMPREHENSIVE; Future  -     URIC ACID; Future  3. Primary hypertension  -     CBC W/O DIFF; Future  -     LIPID PANEL; Future  -     HEMOGLOBIN A1C WITH EAG; Future  -     TSH 3RD GENERATION; Future  -     METABOLIC PANEL, COMPREHENSIVE; Future  -     URIC ACID; Future  4. Diabetes mellitus due to underlying condition with hyperosmolarity without coma, unspecified whether long term insulin use (HCC)  -     CBC W/O DIFF; Future  -     LIPID PANEL; Future  -     HEMOGLOBIN A1C WITH EAG; Future  -     TSH 3RD GENERATION; Future  -     METABOLIC PANEL, COMPREHENSIVE; Future  -     URIC ACID; Future      No follow-ups on file. Blood pressure nicely controlled continue current medication ideal body weight call for any concern    An electronic signature was used to authenticate this note.   -- Raleigh Parks MD

## 2023-04-28 RX ORDER — TORSEMIDE 20 MG/1
60 TABLET ORAL DAILY
Qty: 90 TABLET | Refills: 1 | Status: SHIPPED | OUTPATIENT
Start: 2023-04-28

## 2023-04-28 NOTE — TELEPHONE ENCOUNTER
Last Visit: 4/4/23 with MD Tere Severs  Next Appointment: none  Previous Refill Encounter(s): 1/25/23 #90 with 1 refill    Requested Prescriptions     Pending Prescriptions Disp Refills    torsemide (DEMADEX) 20 mg tablet 90 Tablet 1     Sig: Take 3 Tablets by mouth daily. For Pharmacy Admin Tracking Only    Program: Medication Refill  CPA in place:   Recommendation Provided To:    Intervention Detail: New Rx: 1, reason: Patient Preference  Intervention Accepted By:   Joseph Campos Closed?:   Time Spent (min): 5

## 2023-05-01 RX ORDER — TORSEMIDE 20 MG/1
TABLET ORAL
Qty: 90 TABLET | Refills: 0 | Status: SHIPPED | OUTPATIENT
Start: 2023-05-01

## 2023-05-31 ENCOUNTER — CLINICAL DOCUMENTATION (OUTPATIENT)
Age: 53
End: 2023-05-31

## 2023-06-01 RX ORDER — TORSEMIDE 20 MG/1
60 TABLET ORAL DAILY
Qty: 270 TABLET | Refills: 3 | Status: SHIPPED | OUTPATIENT
Start: 2023-06-01

## 2023-06-01 NOTE — TELEPHONE ENCOUNTER
Last appointment: 4/4/23  Next appointment: none  Previous refill encounter(s): 5/1/23 #90    Requested Prescriptions     Pending Prescriptions Disp Refills    torsemide (DEMADEX) 20 MG tablet [Pharmacy Med Name: Torsemide 20 MG Oral Tablet] 270 tablet 3     Sig: Take 3 tablets by mouth daily         For Pharmacy Admin Tracking Only    Program: Medication Refill  CPA in place:    Recommendation Provided To:    Intervention Detail: New Rx: 1, reason: Patient Preference  Intervention Accepted By:   Marce Yanez Closed?:    Time Spent (min): 5

## 2023-06-22 ENCOUNTER — CLINICAL DOCUMENTATION (OUTPATIENT)
Age: 53
End: 2023-06-22

## 2023-07-05 RX ORDER — PREGABALIN 75 MG/1
CAPSULE ORAL
Qty: 120 CAPSULE | Refills: 0 | OUTPATIENT
Start: 2023-07-05

## 2023-07-11 ENCOUNTER — OFFICE VISIT (OUTPATIENT)
Age: 53
End: 2023-07-11
Payer: MEDICAID

## 2023-07-11 VITALS
HEART RATE: 66 BPM | DIASTOLIC BLOOD PRESSURE: 71 MMHG | WEIGHT: 315 LBS | BODY MASS INDEX: 49.61 KG/M2 | SYSTOLIC BLOOD PRESSURE: 113 MMHG | OXYGEN SATURATION: 95 % | RESPIRATION RATE: 20 BRPM | TEMPERATURE: 98.6 F

## 2023-07-11 DIAGNOSIS — I10 ESSENTIAL (PRIMARY) HYPERTENSION: ICD-10-CM

## 2023-07-11 DIAGNOSIS — Z12.11 SCREENING FOR MALIGNANT NEOPLASM OF COLON: ICD-10-CM

## 2023-07-11 DIAGNOSIS — R60.0 LOCALIZED EDEMA: ICD-10-CM

## 2023-07-11 DIAGNOSIS — E11.21 TYPE 2 DIABETES WITH NEPHROPATHY (HCC): Primary | ICD-10-CM

## 2023-07-11 DIAGNOSIS — N18.31 CHRONIC RENAL IMPAIRMENT, STAGE 3A (HCC): ICD-10-CM

## 2023-07-11 LAB — HBA1C MFR BLD: 8.7 %

## 2023-07-11 PROCEDURE — 83036 HEMOGLOBIN GLYCOSYLATED A1C: CPT | Performed by: FAMILY MEDICINE

## 2023-07-11 PROCEDURE — 3078F DIAST BP <80 MM HG: CPT | Performed by: FAMILY MEDICINE

## 2023-07-11 PROCEDURE — 3074F SYST BP LT 130 MM HG: CPT | Performed by: FAMILY MEDICINE

## 2023-07-11 PROCEDURE — 99214 OFFICE O/P EST MOD 30 MIN: CPT | Performed by: FAMILY MEDICINE

## 2023-07-11 PROCEDURE — PBSHW AMB POC HEMOGLOBIN A1C: Performed by: FAMILY MEDICINE

## 2023-07-11 PROCEDURE — 3052F HG A1C>EQUAL 8.0%<EQUAL 9.0%: CPT | Performed by: FAMILY MEDICINE

## 2023-07-11 RX ORDER — SEMAGLUTIDE 0.68 MG/ML
0.25 INJECTION, SOLUTION SUBCUTANEOUS WEEKLY
Qty: 3 ML | Refills: 1 | Status: SHIPPED | OUTPATIENT
Start: 2023-07-11

## 2023-07-11 NOTE — PROGRESS NOTES
Stefano Peres (:  1970) is a 45 y.o. male,Established patient, here for evaluation of the following chief complaint(s):  Follow-up (diabetes)    pt present for f/u regarding the diabetic state, and bp check,  wtiht ckd, off metformin  patient has been compliant with diabetic meds since last visit and is trying to have a diabetic diet, no Rf needed for today, patient currently denies tingling sensation, has no polyurea and polydipsia, last a1c was not at target,   Hemoglobin A1C 4.8 - 5.6 % 8.7 High       Patient complains of edema. Of the lower legs bilateral, ankles bilateral, feet bilateral, it has been moderate. But improved with good amount of wt loss with current meds,    Pt also had low GFR and elevated crt, pt states that he urinating fine,  No burning sensation, having now back pain, pt is currently on no NSAID's    Constitutional: no chills and fever,  , nad     HENT: no ear pain or nosebleeds. No blurred vision  Respiratory: no shortness of breath, wheezing cough   Cardiovascular: Has no chest pain, ,and racing heart . Gastrointestinal: No constipation, diarrhea, nausea and vomiting. Genitourinary: No frequency. Musculoskeletal: Negative for joint pain. Skin: no itching, no rash. Neurological: Negative for dizziness, no tremors  Psychiatric/Behavioral: Negative for depression, is not nervous/anxious.         General:  alert cooperative appears stated for the age  HEENT; normocephalic and atraumatic PERRLA extraocular motor intact normal tympanic membrane normal external ear bilaterally, mucosal normal no drainage  Neck: supple no adenopathy no JVD no bruit  Lungs: Clear to auscultation bilaterally no rales rhonchi or wheezes  Heart: Normal regular S1-S2 ,  no murmur  Breast exam deferred  Abdomen: Soft nontender normal bowel sounds   Extremities: Normal range of motion no point tenderness normal pulses no edema  Pelvic/: No lesion, no lymphadenopathy  Skin: Normal no lesion no

## 2023-07-12 LAB
ANION GAP SERPL CALC-SCNC: 8 MMOL/L (ref 5–15)
BUN SERPL-MCNC: 47 MG/DL (ref 6–20)
BUN/CREAT SERPL: 23 (ref 12–20)
CALCIUM SERPL-MCNC: 9.5 MG/DL (ref 8.5–10.1)
CHLORIDE SERPL-SCNC: 98 MMOL/L (ref 97–108)
CO2 SERPL-SCNC: 31 MMOL/L (ref 21–32)
CREAT SERPL-MCNC: 2.02 MG/DL (ref 0.7–1.3)
GLUCOSE SERPL-MCNC: 191 MG/DL (ref 65–100)
POTASSIUM SERPL-SCNC: 3.3 MMOL/L (ref 3.5–5.1)
SODIUM SERPL-SCNC: 137 MMOL/L (ref 136–145)

## 2023-07-13 ENCOUNTER — CLINICAL DOCUMENTATION (OUTPATIENT)
Age: 53
End: 2023-07-13

## 2023-07-13 RX ORDER — TORSEMIDE 20 MG/1
60 TABLET ORAL DAILY
Qty: 270 TABLET | Refills: 3
Start: 2023-07-13 | End: 2024-07-07

## 2023-07-13 RX ORDER — CHLORTHALIDONE 25 MG/1
25 TABLET ORAL DAILY
Qty: 90 TABLET | Refills: 3
Start: 2023-07-13

## 2023-07-13 RX ORDER — AMLODIPINE BESYLATE AND BENAZEPRIL HYDROCHLORIDE 10; 40 MG/1; MG/1
1 CAPSULE ORAL DAILY
Qty: 90 CAPSULE | Refills: 3
Start: 2023-07-13

## 2023-07-31 DIAGNOSIS — E11.21 TYPE 2 DIABETES WITH NEPHROPATHY (HCC): ICD-10-CM

## 2023-07-31 DIAGNOSIS — I10 ESSENTIAL (PRIMARY) HYPERTENSION: ICD-10-CM

## 2023-07-31 DIAGNOSIS — N18.31 CHRONIC RENAL IMPAIRMENT, STAGE 3A (HCC): ICD-10-CM

## 2023-07-31 DIAGNOSIS — R60.0 LOCALIZED EDEMA: ICD-10-CM

## 2023-08-02 DIAGNOSIS — I10 ESSENTIAL (PRIMARY) HYPERTENSION: ICD-10-CM

## 2023-08-02 DIAGNOSIS — E11.21 TYPE 2 DIABETES WITH NEPHROPATHY (HCC): ICD-10-CM

## 2023-08-02 DIAGNOSIS — N18.31 CHRONIC RENAL IMPAIRMENT, STAGE 3A (HCC): ICD-10-CM

## 2023-08-02 DIAGNOSIS — R60.0 LOCALIZED EDEMA: ICD-10-CM

## 2023-08-02 RX ORDER — DULAGLUTIDE 0.75 MG/.5ML
INJECTION, SOLUTION SUBCUTANEOUS
Qty: 4 ML | Refills: 0 | OUTPATIENT
Start: 2023-08-02

## 2023-08-02 RX ORDER — CHLORTHALIDONE 25 MG/1
25 TABLET ORAL DAILY
Qty: 90 TABLET | Refills: 3 | OUTPATIENT
Start: 2023-08-02

## 2023-08-02 RX ORDER — PREGABALIN 75 MG/1
CAPSULE ORAL
Qty: 120 CAPSULE | Refills: 0 | OUTPATIENT
Start: 2023-08-02

## 2023-08-02 RX ORDER — CHLORTHALIDONE 25 MG/1
TABLET ORAL
Qty: 90 TABLET | Refills: 0 | OUTPATIENT
Start: 2023-08-02

## 2023-08-03 DIAGNOSIS — N18.31 CHRONIC RENAL IMPAIRMENT, STAGE 3A (HCC): ICD-10-CM

## 2023-08-03 DIAGNOSIS — I10 ESSENTIAL (PRIMARY) HYPERTENSION: ICD-10-CM

## 2023-08-03 DIAGNOSIS — E11.21 TYPE 2 DIABETES WITH NEPHROPATHY (HCC): ICD-10-CM

## 2023-08-03 DIAGNOSIS — R60.0 LOCALIZED EDEMA: ICD-10-CM

## 2023-08-03 RX ORDER — DULAGLUTIDE 0.75 MG/.5ML
INJECTION, SOLUTION SUBCUTANEOUS
Qty: 4 ML | Refills: 0 | OUTPATIENT
Start: 2023-08-03

## 2023-08-04 DIAGNOSIS — E11.21 TYPE 2 DIABETES WITH NEPHROPATHY (HCC): ICD-10-CM

## 2023-08-04 DIAGNOSIS — N18.31 CHRONIC RENAL IMPAIRMENT, STAGE 3A (HCC): ICD-10-CM

## 2023-08-04 DIAGNOSIS — I10 ESSENTIAL (PRIMARY) HYPERTENSION: ICD-10-CM

## 2023-08-04 DIAGNOSIS — R60.0 LOCALIZED EDEMA: ICD-10-CM

## 2023-08-08 DIAGNOSIS — I10 ESSENTIAL (PRIMARY) HYPERTENSION: ICD-10-CM

## 2023-08-08 DIAGNOSIS — N18.31 CHRONIC RENAL IMPAIRMENT, STAGE 3A (HCC): ICD-10-CM

## 2023-08-08 DIAGNOSIS — E11.21 TYPE 2 DIABETES WITH NEPHROPATHY (HCC): ICD-10-CM

## 2023-08-08 DIAGNOSIS — R60.0 LOCALIZED EDEMA: ICD-10-CM

## 2023-08-08 NOTE — TELEPHONE ENCOUNTER
Duplicate      For Pharmacy Admin Tracking Only    Program: Medication Refill  CPA in place:    Recommendation Provided To:    Intervention Detail: Discontinued Rx: 1, reason: Duplicate Therapy  Intervention Accepted By:   Maryellen Workman Closed?:    Time Spent (min): 5

## 2023-08-08 NOTE — TELEPHONE ENCOUNTER
Previous Rx was set to [employer]" so it was never received by the pharmacy. Last appointment: 7/11/23  Next appointment: Ivon Jose Luisshannan to follow-up 10/11/23  Previous refill encounter(s): 1/25/23 #90    Requested Prescriptions     Pending Prescriptions Disp Refills    chlorthalidone (HYGROTON) 25 MG tablet [Pharmacy Med Name: Chlorthalidone 25 MG Oral Tablet] 90 tablet 3     Sig: Take 1 tablet by mouth once daily         For Pharmacy Admin Tracking Only    Program: Medication Refill  CPA in place:    Recommendation Provided To:    Intervention Detail: New Rx: 1, reason: Patient Preference  Intervention Accepted By:   Ethan Baires Closed?:    Time Spent (min): 5

## 2023-08-09 RX ORDER — CHLORTHALIDONE 25 MG/1
TABLET ORAL
Qty: 90 TABLET | Refills: 3 | Status: SHIPPED | OUTPATIENT
Start: 2023-08-09

## 2023-08-09 RX ORDER — CHLORTHALIDONE 25 MG/1
25 TABLET ORAL DAILY
Qty: 90 TABLET | Refills: 3 | OUTPATIENT
Start: 2023-08-09

## 2023-10-14 DIAGNOSIS — E11.21 TYPE 2 DIABETES WITH NEPHROPATHY (HCC): ICD-10-CM

## 2023-10-14 DIAGNOSIS — R60.0 LOCALIZED EDEMA: ICD-10-CM

## 2023-10-14 DIAGNOSIS — N18.31 CHRONIC RENAL IMPAIRMENT, STAGE 3A (HCC): ICD-10-CM

## 2023-10-14 DIAGNOSIS — I10 ESSENTIAL (PRIMARY) HYPERTENSION: ICD-10-CM

## 2023-10-16 DIAGNOSIS — R60.0 LOCALIZED EDEMA: ICD-10-CM

## 2023-10-16 DIAGNOSIS — E11.21 TYPE 2 DIABETES WITH NEPHROPATHY (HCC): ICD-10-CM

## 2023-10-16 DIAGNOSIS — N18.31 CHRONIC RENAL IMPAIRMENT, STAGE 3A (HCC): ICD-10-CM

## 2023-10-16 DIAGNOSIS — I10 ESSENTIAL (PRIMARY) HYPERTENSION: ICD-10-CM

## 2023-10-18 DIAGNOSIS — I10 ESSENTIAL (PRIMARY) HYPERTENSION: ICD-10-CM

## 2023-10-18 DIAGNOSIS — E11.21 TYPE 2 DIABETES WITH NEPHROPATHY (HCC): ICD-10-CM

## 2023-10-18 DIAGNOSIS — N18.31 CHRONIC RENAL IMPAIRMENT, STAGE 3A (HCC): ICD-10-CM

## 2023-10-18 DIAGNOSIS — R60.0 LOCALIZED EDEMA: ICD-10-CM

## 2023-10-18 RX ORDER — AMLODIPINE AND BENAZEPRIL HYDROCHLORIDE 10; 40 MG/1; MG/1
1 CAPSULE ORAL DAILY
Qty: 90 CAPSULE | Refills: 0 | Status: SHIPPED | OUTPATIENT
Start: 2023-10-18

## 2023-10-18 RX ORDER — AMLODIPINE AND BENAZEPRIL HYDROCHLORIDE 10; 40 MG/1; MG/1
1 CAPSULE ORAL DAILY
Qty: 90 CAPSULE | Refills: 3 | Status: SHIPPED | OUTPATIENT
Start: 2023-10-18

## 2023-10-25 DIAGNOSIS — R60.0 LOCALIZED EDEMA: ICD-10-CM

## 2023-10-25 DIAGNOSIS — N18.31 CHRONIC RENAL IMPAIRMENT, STAGE 3A (HCC): ICD-10-CM

## 2023-10-25 DIAGNOSIS — E11.21 TYPE 2 DIABETES WITH NEPHROPATHY (HCC): ICD-10-CM

## 2023-10-25 DIAGNOSIS — I10 ESSENTIAL (PRIMARY) HYPERTENSION: ICD-10-CM

## 2023-10-26 RX ORDER — DULAGLUTIDE 0.75 MG/.5ML
0.75 INJECTION, SOLUTION SUBCUTANEOUS WEEKLY
Qty: 4 ML | Refills: 0 | OUTPATIENT
Start: 2023-10-26

## 2023-10-26 NOTE — TELEPHONE ENCOUNTER
Duplicate      For Pharmacy Admin Tracking Only    Program: Medication Refill  CPA in place:    Recommendation Provided To:    Intervention Detail: Discontinued Rx: 1, reason: Duplicate Therapy  Intervention Accepted By:   Fanny Dubose Closed?:    Time Spent (min): 5

## 2023-10-26 NOTE — TELEPHONE ENCOUNTER
Last appointment: 7/11/23  Next appointment: Judie Roxanne to follow-up 10/11/23  Previous refill encounter(s): 8/4/23 #2ml with 3 refills    Requested Prescriptions     Pending Prescriptions Disp Refills    Dulaglutide 0.75 MG/0.5ML SOPN 2 mL 2     Sig: Inject 0.75 mg into the skin once a week         For Pharmacy Admin Tracking Only    Program: Medication Refill  CPA in place:    Recommendation Provided To:    Intervention Detail: New Rx: 1, reason: Patient Preference  Intervention Accepted By:   Matilde Garcia Closed?:    Time Spent (min): 5

## 2024-01-23 DIAGNOSIS — R60.0 LOCALIZED EDEMA: ICD-10-CM

## 2024-01-23 DIAGNOSIS — N18.31 CHRONIC RENAL IMPAIRMENT, STAGE 3A (HCC): ICD-10-CM

## 2024-01-23 DIAGNOSIS — I10 ESSENTIAL (PRIMARY) HYPERTENSION: ICD-10-CM

## 2024-01-23 DIAGNOSIS — E11.21 TYPE 2 DIABETES WITH NEPHROPATHY (HCC): ICD-10-CM

## 2024-01-25 DIAGNOSIS — E11.21 TYPE 2 DIABETES WITH NEPHROPATHY (HCC): ICD-10-CM

## 2024-01-25 DIAGNOSIS — R60.0 LOCALIZED EDEMA: ICD-10-CM

## 2024-01-25 DIAGNOSIS — N18.31 CHRONIC RENAL IMPAIRMENT, STAGE 3A (HCC): ICD-10-CM

## 2024-01-25 DIAGNOSIS — I10 ESSENTIAL (PRIMARY) HYPERTENSION: ICD-10-CM

## 2024-01-26 RX ORDER — DULAGLUTIDE 0.75 MG/.5ML
0.75 INJECTION, SOLUTION SUBCUTANEOUS WEEKLY
Qty: 4 ML | Refills: 0 | Status: SHIPPED | OUTPATIENT
Start: 2024-01-26

## 2024-02-29 NOTE — TELEPHONE ENCOUNTER
Last appointment: 7/11/23  Next appointment: Advised to follow-up 10/11/23  Previous refill encounter(s): 2/6/23    Requested Prescriptions     Pending Prescriptions Disp Refills    febuxostat (ULORIC) 80 MG TABS tablet [Pharmacy Med Name: Febuxostat 80 MG Oral Tablet] 30 tablet 0     Sig: Take 1 tablet by mouth daily Patient needs an appointment for further refills         For Pharmacy Admin Tracking Only    Program: Medication Refill  CPA in place:    Recommendation Provided To:   Intervention Detail: New Rx: 1, reason: Patient Preference and Scheduled Appointment  Intervention Accepted By:   Gap Closed?:    Time Spent (min): 5

## 2024-03-01 RX ORDER — FEBUXOSTAT 80 MG/1
80 TABLET, FILM COATED ORAL DAILY
Qty: 30 TABLET | Refills: 0 | Status: SHIPPED | OUTPATIENT
Start: 2024-03-01

## 2024-03-05 ENCOUNTER — CLINICAL DOCUMENTATION (OUTPATIENT)
Age: 54
End: 2024-03-05

## 2024-03-07 RX ORDER — FEBUXOSTAT 80 MG/1
80 TABLET, FILM COATED ORAL DAILY
Qty: 30 TABLET | Refills: 0 | OUTPATIENT
Start: 2024-03-07

## 2024-03-07 NOTE — TELEPHONE ENCOUNTER
Uloric was sent on 3/1/24 for #30. A PA was submitted but then denied.     Please advise on next steps.      For Pharmacy Admin Tracking Only    Program: Medication Refill  CPA in place:    Recommendation Provided To:   Intervention Detail: New Rx: 1, reason: Cost/Formulary Change  Intervention Accepted By:   Gap Closed?:    Time Spent (min): 5

## 2024-03-09 RX ORDER — ALLOPURINOL 100 MG/1
200 TABLET ORAL DAILY
Qty: 60 TABLET | Refills: 3 | Status: SHIPPED | OUTPATIENT
Start: 2024-03-09

## 2024-03-26 RX ORDER — GLIPIZIDE 10 MG/1
15 TABLET ORAL 2 TIMES DAILY
Qty: 90 TABLET | Refills: 0 | Status: SHIPPED | OUTPATIENT
Start: 2024-03-26

## 2024-03-26 NOTE — TELEPHONE ENCOUNTER
Last appointment: 7/11/23  Next appointment: Advised to follow-up 10/11/23  Previous refill encounter(s): 2/21/23    Requested Prescriptions     Pending Prescriptions Disp Refills    glipiZIDE (GLUCOTROL) 10 MG tablet [Pharmacy Med Name: glipiZIDE 10 MG Oral Tablet] 90 tablet 0     Sig: Take 1.5 tablets by mouth 2 times daily Patient needs an appointment for further refills         For Pharmacy Admin Tracking Only    Program: Medication Refill  CPA in place:    Recommendation Provided To:   Intervention Detail: New Rx: 1, reason: Patient Preference  Intervention Accepted By:   Gap Closed?:    Time Spent (min): 5

## 2024-04-24 RX ORDER — ALLOPURINOL 100 MG/1
200 TABLET ORAL DAILY
Qty: 180 TABLET | Refills: 0 | Status: SHIPPED | OUTPATIENT
Start: 2024-04-24

## 2024-04-24 NOTE — PROGRESS NOTES
Uloric 80 mg not covered by the insurance company prescription has been changed to allopurinol 200 mg once daily

## 2024-04-25 DIAGNOSIS — R60.0 LOCALIZED EDEMA: ICD-10-CM

## 2024-04-25 DIAGNOSIS — I10 ESSENTIAL (PRIMARY) HYPERTENSION: ICD-10-CM

## 2024-04-25 DIAGNOSIS — E11.21 TYPE 2 DIABETES WITH NEPHROPATHY (HCC): ICD-10-CM

## 2024-04-25 DIAGNOSIS — N18.31 CHRONIC RENAL IMPAIRMENT, STAGE 3A (HCC): ICD-10-CM

## 2024-04-25 NOTE — TELEPHONE ENCOUNTER
Last appointment: 7/11/23  Next appointment: Advised to follow-up 10/11/23  Previous refill encounter(s): 1/26/24 #4ml    Requested Prescriptions     Pending Prescriptions Disp Refills    dulaglutide (TRULICITY) 0.75 MG/0.5ML SOPN SC injection 2 mL 0     Sig: Inject 0.5 mLs into the skin every 7 days         For Pharmacy Admin Tracking Only    Program: Medication Refill  CPA in place:    Recommendation Provided To:   Intervention Detail: New Rx: 1, reason: Patient Preference  Intervention Accepted By:   Gap Closed?:    Time Spent (min): 5

## 2024-04-26 RX ORDER — DULAGLUTIDE 0.75 MG/.5ML
0.75 INJECTION, SOLUTION SUBCUTANEOUS
Qty: 2 ML | Refills: 0 | Status: SHIPPED | OUTPATIENT
Start: 2024-04-26

## 2024-05-10 RX ORDER — GLIPIZIDE 10 MG/1
15 TABLET ORAL 2 TIMES DAILY
Qty: 90 TABLET | Refills: 0 | Status: SHIPPED | OUTPATIENT
Start: 2024-05-10

## 2024-05-10 NOTE — TELEPHONE ENCOUNTER
Patient is requesting to schedule an appt in another encounter    Last appointment: 7/11/23  Next appointment: none  Previous refill encounter(s): 3/26/24 #90    Requested Prescriptions     Pending Prescriptions Disp Refills    glipiZIDE (GLUCOTROL) 10 MG tablet 90 tablet 0     Sig: Take 1.5 tablets by mouth 2 times daily Patient needs an appointment for further refills         For Pharmacy Admin Tracking Only    Program: Medication Refill  CPA in place:    Recommendation Provided To:   Intervention Detail: New Rx: 1, reason: Patient Preference  Intervention Accepted By:   Gap Closed?:    Time Spent (min): 5

## 2024-05-22 DIAGNOSIS — N18.31 CHRONIC RENAL IMPAIRMENT, STAGE 3A (HCC): ICD-10-CM

## 2024-05-22 DIAGNOSIS — R60.0 LOCALIZED EDEMA: ICD-10-CM

## 2024-05-22 DIAGNOSIS — I10 ESSENTIAL (PRIMARY) HYPERTENSION: ICD-10-CM

## 2024-05-22 DIAGNOSIS — E11.21 TYPE 2 DIABETES WITH NEPHROPATHY (HCC): ICD-10-CM

## 2024-05-23 RX ORDER — DULAGLUTIDE 0.75 MG/.5ML
0.75 INJECTION, SOLUTION SUBCUTANEOUS
Qty: 2 ML | Refills: 0 | Status: SHIPPED | OUTPATIENT
Start: 2024-05-23

## 2024-05-23 NOTE — TELEPHONE ENCOUNTER
Last appointment: 7/11/23  Next appointment: 6/6/24  Previous refill encounter(s): 4/26/24 #2ml    Requested Prescriptions     Pending Prescriptions Disp Refills    dulaglutide (TRULICITY) 0.75 MG/0.5ML SOPN SC injection 2 mL 0     Sig: Inject 0.5 mLs into the skin every 7 days         For Pharmacy Admin Tracking Only    Program: Medication Refill  CPA in place:    Recommendation Provided To:   Intervention Detail: New Rx: 1, reason: Patient Preference  Intervention Accepted By:   Gap Closed?:    Time Spent (min): 5

## 2024-06-02 DIAGNOSIS — N18.31 CHRONIC RENAL IMPAIRMENT, STAGE 3A (HCC): ICD-10-CM

## 2024-06-02 DIAGNOSIS — I10 ESSENTIAL (PRIMARY) HYPERTENSION: ICD-10-CM

## 2024-06-02 DIAGNOSIS — E11.21 TYPE 2 DIABETES WITH NEPHROPATHY (HCC): ICD-10-CM

## 2024-06-02 DIAGNOSIS — R60.0 LOCALIZED EDEMA: ICD-10-CM

## 2024-06-04 DIAGNOSIS — R60.0 LOCALIZED EDEMA: ICD-10-CM

## 2024-06-04 DIAGNOSIS — I10 ESSENTIAL (PRIMARY) HYPERTENSION: ICD-10-CM

## 2024-06-04 DIAGNOSIS — E11.21 TYPE 2 DIABETES WITH NEPHROPATHY (HCC): ICD-10-CM

## 2024-06-04 DIAGNOSIS — N18.31 CHRONIC RENAL IMPAIRMENT, STAGE 3A (HCC): ICD-10-CM

## 2024-06-05 RX ORDER — TORSEMIDE 20 MG/1
60 TABLET ORAL DAILY
Qty: 270 TABLET | Refills: 3 | Status: SHIPPED | OUTPATIENT
Start: 2024-06-05 | End: 2024-06-06

## 2024-06-05 RX ORDER — TORSEMIDE 20 MG/1
60 TABLET ORAL DAILY
Qty: 270 TABLET | Refills: 3 | Status: SHIPPED | OUTPATIENT
Start: 2024-06-05

## 2024-06-05 NOTE — TELEPHONE ENCOUNTER
Last appointment: 7/11/23  Next appointment: 6/6/24  Previous refill encounter(s): 6/1/23    Requested Prescriptions     Pending Prescriptions Disp Refills    torsemide (DEMADEX) 20 MG tablet [Pharmacy Med Name: Torsemide 20 MG Oral Tablet] 270 tablet 3     Sig: Take 3 tablets by mouth once daily         For Pharmacy Admin Tracking Only    Program: Medication Refill  CPA in place:    Recommendation Provided To:   Intervention Detail: New Rx: 1, reason: Patient Preference  Intervention Accepted By:   Gap Closed?:    Time Spent (min): 5

## 2024-06-06 ENCOUNTER — OFFICE VISIT (OUTPATIENT)
Age: 54
End: 2024-06-06
Payer: MEDICARE

## 2024-06-06 VITALS
BODY MASS INDEX: 41.75 KG/M2 | HEIGHT: 73 IN | HEART RATE: 58 BPM | SYSTOLIC BLOOD PRESSURE: 126 MMHG | DIASTOLIC BLOOD PRESSURE: 73 MMHG | RESPIRATION RATE: 19 BRPM | TEMPERATURE: 97.5 F | OXYGEN SATURATION: 95 % | WEIGHT: 315 LBS

## 2024-06-06 DIAGNOSIS — E11.42 DIABETIC POLYNEUROPATHY ASSOCIATED WITH TYPE 2 DIABETES MELLITUS (HCC): ICD-10-CM

## 2024-06-06 DIAGNOSIS — I10 PRIMARY HYPERTENSION: ICD-10-CM

## 2024-06-06 DIAGNOSIS — E11.21 TYPE 2 DIABETES WITH NEPHROPATHY (HCC): Primary | ICD-10-CM

## 2024-06-06 DIAGNOSIS — E11.21 TYPE 2 DIABETES WITH NEPHROPATHY (HCC): ICD-10-CM

## 2024-06-06 DIAGNOSIS — Z12.5 ENCOUNTER FOR SCREENING FOR MALIGNANT NEOPLASM OF PROSTATE: ICD-10-CM

## 2024-06-06 DIAGNOSIS — Z00.00 MEDICARE ANNUAL WELLNESS VISIT, SUBSEQUENT: ICD-10-CM

## 2024-06-06 LAB — HBA1C MFR BLD: 8.3 %

## 2024-06-06 PROCEDURE — PBSHW AMB POC HEMOGLOBIN A1C: Performed by: FAMILY MEDICINE

## 2024-06-06 PROCEDURE — G0439 PPPS, SUBSEQ VISIT: HCPCS | Performed by: FAMILY MEDICINE

## 2024-06-06 PROCEDURE — 3078F DIAST BP <80 MM HG: CPT | Performed by: FAMILY MEDICINE

## 2024-06-06 PROCEDURE — 3074F SYST BP LT 130 MM HG: CPT | Performed by: FAMILY MEDICINE

## 2024-06-06 PROCEDURE — 83036 HEMOGLOBIN GLYCOSYLATED A1C: CPT | Performed by: FAMILY MEDICINE

## 2024-06-06 RX ORDER — DULAGLUTIDE 1.5 MG/.5ML
1.5 INJECTION, SOLUTION SUBCUTANEOUS WEEKLY
Qty: 0.5 ML | Refills: 2 | Status: SHIPPED | OUTPATIENT
Start: 2024-06-06

## 2024-06-06 RX ORDER — CLOTRIMAZOLE AND BETAMETHASONE DIPROPIONATE 10; .64 MG/G; MG/G
CREAM TOPICAL
COMMUNITY
Start: 2024-05-23

## 2024-06-06 RX ORDER — PREGABALIN 75 MG/1
75 CAPSULE ORAL 2 TIMES DAILY
Qty: 60 CAPSULE | Refills: 2 | Status: SHIPPED | OUTPATIENT
Start: 2024-06-06 | End: 2024-09-04

## 2024-06-06 RX ORDER — CLOTRIMAZOLE 1 %
CREAM (GRAM) TOPICAL
COMMUNITY
Start: 2024-05-23

## 2024-06-06 SDOH — ECONOMIC STABILITY: HOUSING INSECURITY
IN THE LAST 12 MONTHS, WAS THERE A TIME WHEN YOU DID NOT HAVE A STEADY PLACE TO SLEEP OR SLEPT IN A SHELTER (INCLUDING NOW)?: NO

## 2024-06-06 SDOH — ECONOMIC STABILITY: INCOME INSECURITY: HOW HARD IS IT FOR YOU TO PAY FOR THE VERY BASICS LIKE FOOD, HOUSING, MEDICAL CARE, AND HEATING?: NOT HARD AT ALL

## 2024-06-06 SDOH — ECONOMIC STABILITY: FOOD INSECURITY: WITHIN THE PAST 12 MONTHS, THE FOOD YOU BOUGHT JUST DIDN'T LAST AND YOU DIDN'T HAVE MONEY TO GET MORE.: NEVER TRUE

## 2024-06-06 SDOH — ECONOMIC STABILITY: FOOD INSECURITY: WITHIN THE PAST 12 MONTHS, YOU WORRIED THAT YOUR FOOD WOULD RUN OUT BEFORE YOU GOT MONEY TO BUY MORE.: NEVER TRUE

## 2024-06-06 ASSESSMENT — PATIENT HEALTH QUESTIONNAIRE - PHQ9
SUM OF ALL RESPONSES TO PHQ QUESTIONS 1-9: 0
SUM OF ALL RESPONSES TO PHQ QUESTIONS 1-9: 0
1. LITTLE INTEREST OR PLEASURE IN DOING THINGS: NOT AT ALL
2. FEELING DOWN, DEPRESSED OR HOPELESS: NOT AT ALL
SUM OF ALL RESPONSES TO PHQ QUESTIONS 1-9: 0
SUM OF ALL RESPONSES TO PHQ9 QUESTIONS 1 & 2: 0
SUM OF ALL RESPONSES TO PHQ QUESTIONS 1-9: 0

## 2024-06-06 ASSESSMENT — LIFESTYLE VARIABLES
HOW MANY STANDARD DRINKS CONTAINING ALCOHOL DO YOU HAVE ON A TYPICAL DAY: PATIENT DOES NOT DRINK
HOW OFTEN DO YOU HAVE A DRINK CONTAINING ALCOHOL: NEVER

## 2024-06-06 NOTE — PROGRESS NOTES
Chief Complaint   Patient presents with    Medicare AWV       \"Have you been to the ER, urgent care clinic since your last visit?  Hospitalized since your last visit?\"    NO    “Have you seen or consulted any other health care providers outside of [facility] since your last visit?”    NO    “Have you had a colorectal cancer screening such as a colonoscopy/FIT/Cologuard?    NO    No colonoscopy on file  No cologuard on file  No FIT/FOBT on file   No flexible sigmoidoscopy on file           Results for orders placed or performed in visit on 24   AMB POC HEMOGLOBIN A1C   Result Value Ref Range    Hemoglobin A1C, POC 8.3 %      Vitals:    24 1015   BP: 126/73   Pulse: 58   Resp: 19   Temp: 97.5 °F (36.4 °C)   TempSrc: Infrared   SpO2: 95%   Weight: (!) 173.3 kg (382 lb)   Height: 1.854 m (6' 1\")        Health Maintenance Due   Topic Date Due    HIV screen  Never done    Colorectal Cancer Screen  Never done    Pneumococcal 0-64 years Vaccine (2 of 2 - PCV) 2015    Diabetic retinal exam  2017    Diabetic foot exam  03/15/2023    Diabetic Alb to Cr ratio (uACR) test  2023    COVID-19 Vaccine ( season) 2023    Annual Wellness Visit (Medicare Advantage)  Never done    DTaP/Tdap/Td vaccine (2 - Td or Tdap) 2024    Depression Screen  2024    Lipids  2024        The patient, Eric Bagley, identity was verified by name and

## 2024-06-06 NOTE — PROGRESS NOTES
Medicare Annual Wellness Visit    Eric Bagley is here for Medicare AWV    Assessment & Plan   Type 2 diabetes with nephropathy (HCC)  -     AMB POC HEMOGLOBIN A1C  -     dulaglutide (TRULICITY) 1.5 MG/0.5ML SC injection; Inject 0.5 mLs into the skin once a week, Disp-0.5 mL, R-2Normal  -     PSA Screening; Future  -     [facility]  -     CBC; Future  -     Comprehensive Metabolic Panel; Future  -     Lipid Panel; Future  -     TSH; Future  -     pregabalin (LYRICA) 75 MG capsule; Take 1 capsule by mouth 2 times daily for 90 days. Max Daily Amount: 150 mg, Disp-60 capsule, R-2Normal  Medicare annual wellness visit, subsequent  Primary hypertension  -     dulaglutide (TRULICITY) 1.5 MG/0.5ML SC injection; Inject 0.5 mLs into the skin once a week, Disp-0.5 mL, R-2Normal  -     PSA Screening; Future  -     [facility]  -     CBC; Future  -     Comprehensive Metabolic Panel; Future  -     Lipid Panel; Future  -     TSH; Future  -     pregabalin (LYRICA) 75 MG capsule; Take 1 capsule by mouth 2 times daily for 90 days. Max Daily Amount: 150 mg, Disp-60 capsule, R-2Normal  Diabetic polyneuropathy associated with type 2 diabetes mellitus (HCC)  -     dulaglutide (TRULICITY) 1.5 MG/0.5ML SC injection; Inject 0.5 mLs into the skin once a week, Disp-0.5 mL, R-2Normal  -     PSA Screening; Future  -     [facility]  -     CBC; Future  -     Comprehensive Metabolic Panel; Future  -     Lipid Panel; Future  -     TSH; Future  -     pregabalin (LYRICA) 75 MG capsule; Take 1 capsule by mouth 2 times daily for 90 days. Max Daily Amount: 150 mg, Disp-60 capsule, R-2Normal  Encounter for screening for malignant neoplasm of prostate  -     PSA Screening; Future    Recommendations for Preventive Services Due: see orders and patient instructions/AVS.  Recommended screening

## 2024-06-07 LAB
ALBUMIN SERPL-MCNC: 4 G/DL (ref 3.8–4.9)
ALBUMIN/GLOB SERPL: 1.3 {RATIO} (ref 1.2–2.2)
ALP SERPL-CCNC: 65 IU/L (ref 44–121)
ALT SERPL-CCNC: 34 IU/L (ref 0–44)
AST SERPL-CCNC: 23 IU/L (ref 0–40)
BILIRUB SERPL-MCNC: 0.5 MG/DL (ref 0–1.2)
BUN SERPL-MCNC: 30 MG/DL (ref 6–24)
BUN/CREAT SERPL: 22 (ref 9–20)
CALCIUM SERPL-MCNC: 9 MG/DL (ref 8.7–10.2)
CHLORIDE SERPL-SCNC: 101 MMOL/L (ref 96–106)
CHOLEST SERPL-MCNC: 159 MG/DL (ref 100–199)
CO2 SERPL-SCNC: 30 MMOL/L (ref 20–29)
CREAT SERPL-MCNC: 1.37 MG/DL (ref 0.76–1.27)
EGFRCR SERPLBLD CKD-EPI 2021: 61 ML/MIN/1.73
ERYTHROCYTE [DISTWIDTH] IN BLOOD BY AUTOMATED COUNT: 14.4 % (ref 11.6–15.4)
GLOBULIN SER CALC-MCNC: 3.2 G/DL (ref 1.5–4.5)
GLUCOSE SERPL-MCNC: 217 MG/DL (ref 70–99)
HCT VFR BLD AUTO: 42.9 % (ref 37.5–51)
HDLC SERPL-MCNC: 40 MG/DL
HGB BLD-MCNC: 13.3 G/DL (ref 13–17.7)
LDLC SERPL CALC-MCNC: 99 MG/DL (ref 0–99)
MCH RBC QN AUTO: 27.7 PG (ref 26.6–33)
MCHC RBC AUTO-ENTMCNC: 31 G/DL (ref 31.5–35.7)
MCV RBC AUTO: 89 FL (ref 79–97)
PLATELET # BLD AUTO: 224 X10E3/UL (ref 150–450)
POTASSIUM SERPL-SCNC: 3.9 MMOL/L (ref 3.5–5.2)
PROT SERPL-MCNC: 7.2 G/DL (ref 6–8.5)
PSA SERPL-MCNC: 0.4 NG/ML (ref 0–4)
RBC # BLD AUTO: 4.81 X10E6/UL (ref 4.14–5.8)
SODIUM SERPL-SCNC: 142 MMOL/L (ref 134–144)
TRIGL SERPL-MCNC: 109 MG/DL (ref 0–149)
TSH SERPL DL<=0.005 MIU/L-ACNC: 5.15 UIU/ML (ref 0.45–4.5)
VLDLC SERPL CALC-MCNC: 20 MG/DL (ref 5–40)
WBC # BLD AUTO: 8.9 X10E3/UL (ref 3.4–10.8)

## 2024-07-02 ENCOUNTER — CLINICAL DOCUMENTATION (OUTPATIENT)
Age: 54
End: 2024-07-02

## 2024-07-02 NOTE — TELEPHONE ENCOUNTER
Last appointment: 6/6/24  Next appointment: Advised to follow-up 12/6/24  Previous refill encounter(s): 5/10/24 #90    Requested Prescriptions     Pending Prescriptions Disp Refills    glipiZIDE (GLUCOTROL) 10 MG tablet 270 tablet 1     Sig: Take 1.5 tablets by mouth 2 times daily         For Pharmacy Admin Tracking Only    Program: Medication Refill  CPA in place:    Recommendation Provided To:   Intervention Detail: New Rx: 1, reason: Patient Preference  Intervention Accepted By:   Gap Closed?:    Time Spent (min): 5

## 2024-07-03 RX ORDER — GLIPIZIDE 10 MG/1
TABLET ORAL
Qty: 90 TABLET | Refills: 0 | OUTPATIENT
Start: 2024-07-03

## 2024-07-05 DIAGNOSIS — E11.21 TYPE 2 DIABETES WITH NEPHROPATHY (HCC): ICD-10-CM

## 2024-07-05 DIAGNOSIS — E11.42 DIABETIC POLYNEUROPATHY ASSOCIATED WITH TYPE 2 DIABETES MELLITUS (HCC): ICD-10-CM

## 2024-07-05 DIAGNOSIS — I10 PRIMARY HYPERTENSION: ICD-10-CM

## 2024-07-05 RX ORDER — GLIPIZIDE 10 MG/1
15 TABLET ORAL 2 TIMES DAILY
Qty: 270 TABLET | Refills: 1 | Status: SHIPPED | OUTPATIENT
Start: 2024-07-05

## 2024-07-05 RX ORDER — GLIPIZIDE 10 MG/1
TABLET ORAL
Qty: 90 TABLET | Refills: 0 | Status: SHIPPED | OUTPATIENT
Start: 2024-07-05

## 2024-07-08 RX ORDER — DULAGLUTIDE 1.5 MG/.5ML
1.5 INJECTION, SOLUTION SUBCUTANEOUS WEEKLY
Qty: 0.5 ML | Refills: 2 | Status: SHIPPED | OUTPATIENT
Start: 2024-07-08

## 2024-07-30 NOTE — TELEPHONE ENCOUNTER
Last appointment: 6/6/24  Next appointment: Advised to follow-up 12/6/24  Previous refill encounter(s): 3/9/24 #60 with 3 refills    Requested Prescriptions     Pending Prescriptions Disp Refills    allopurinol (ZYLOPRIM) 100 MG tablet 180 tablet 1     Sig: Take 2 tablets by mouth daily         For Pharmacy Admin Tracking Only    Program: Medication Refill  CPA in place:    Recommendation Provided To:   Intervention Detail: New Rx: 1, reason: Patient Preference  Intervention Accepted By:   Gap Closed?:    Time Spent (min): 5

## 2024-08-01 RX ORDER — ALLOPURINOL 100 MG/1
200 TABLET ORAL DAILY
Qty: 180 TABLET | Refills: 1 | Status: SHIPPED | OUTPATIENT
Start: 2024-08-01

## 2024-08-06 DIAGNOSIS — N18.31 CHRONIC RENAL IMPAIRMENT, STAGE 3A (HCC): ICD-10-CM

## 2024-08-06 DIAGNOSIS — R60.0 LOCALIZED EDEMA: ICD-10-CM

## 2024-08-06 DIAGNOSIS — E11.21 TYPE 2 DIABETES WITH NEPHROPATHY (HCC): ICD-10-CM

## 2024-08-06 DIAGNOSIS — I10 ESSENTIAL (PRIMARY) HYPERTENSION: ICD-10-CM

## 2024-08-07 NOTE — TELEPHONE ENCOUNTER
Last appointment: 6/6/24  Next appointment: Advised to follow-up 12/6/24  Previous refill encounter(s): 8/9/23    Requested Prescriptions     Pending Prescriptions Disp Refills    chlorthalidone (HYGROTON) 25 MG tablet 90 tablet 3     Sig: Take 1 tablet by mouth daily         For Pharmacy Admin Tracking Only    Program: Medication Refill  CPA in place:    Recommendation Provided To:   Intervention Detail: New Rx: 1, reason: Patient Preference  Intervention Accepted By:   Gap Closed?:    Time Spent (min): 5

## 2024-08-08 RX ORDER — CHLORTHALIDONE 25 MG/1
TABLET ORAL
Qty: 90 TABLET | Refills: 0 | OUTPATIENT
Start: 2024-08-08

## 2024-08-09 RX ORDER — CHLORTHALIDONE 25 MG/1
25 TABLET ORAL DAILY
Qty: 90 TABLET | Refills: 3 | Status: SHIPPED | OUTPATIENT
Start: 2024-08-09

## 2024-10-24 NOTE — TELEPHONE ENCOUNTER
Last appointment: 6/6/24  Next appointment: Advised to follow-up 12/6/24  Previous refill encounter(s): 8/1/24 #180 with 1 refill    Requested Prescriptions     Pending Prescriptions Disp Refills    allopurinol (ZYLOPRIM) 100 MG tablet 180 tablet 1     Sig: Take 2 tablets by mouth daily         For Pharmacy Admin Tracking Only    Program: Medication Refill  CPA in place:    Recommendation Provided To:   Intervention Detail: New Rx: 1, reason: Patient Preference  Intervention Accepted By:   Gap Closed?:    Time Spent (min): 5

## 2024-10-25 RX ORDER — ALLOPURINOL 100 MG/1
200 TABLET ORAL DAILY
Qty: 180 TABLET | Refills: 1 | Status: SHIPPED | OUTPATIENT
Start: 2024-10-25

## 2024-11-25 DIAGNOSIS — I10 PRIMARY HYPERTENSION: ICD-10-CM

## 2024-11-25 DIAGNOSIS — E11.42 DIABETIC POLYNEUROPATHY ASSOCIATED WITH TYPE 2 DIABETES MELLITUS (HCC): ICD-10-CM

## 2024-11-25 DIAGNOSIS — E11.21 TYPE 2 DIABETES WITH NEPHROPATHY (HCC): ICD-10-CM

## 2024-11-26 NOTE — TELEPHONE ENCOUNTER
Last appointment: 6/6/24  Next appointment: Advised to follow-up 12/6/24  Previous refill encounter(s): 7/8/24 #1 with 2 refills    Requested Prescriptions     Pending Prescriptions Disp Refills    dulaglutide (TRULICITY) 1.5 MG/0.5ML SC injection [Pharmacy Med Name: Trulicity 1.5 MG/0.5ML Subcutaneous Solution Pen-injector] 2 mL 2     Sig: INJECT 1/2 (ONE-HALF) ML SUBCUTANEOUSLY  ONCE A WEEK         For Pharmacy Admin Tracking Only    Program: Medication Refill  CPA in place:    Recommendation Provided To:   Intervention Detail: New Rx: 1, reason: Patient Preference  Intervention Accepted By:   Gap Closed?:    Time Spent (min): 5

## 2024-11-27 RX ORDER — DULAGLUTIDE 1.5 MG/.5ML
INJECTION, SOLUTION SUBCUTANEOUS
Qty: 2 ML | Refills: 2 | Status: SHIPPED | OUTPATIENT
Start: 2024-11-27

## 2025-01-07 DIAGNOSIS — R60.0 LOCALIZED EDEMA: ICD-10-CM

## 2025-01-07 DIAGNOSIS — E11.21 TYPE 2 DIABETES WITH NEPHROPATHY (HCC): ICD-10-CM

## 2025-01-07 DIAGNOSIS — N18.31 CHRONIC RENAL IMPAIRMENT, STAGE 3A (HCC): ICD-10-CM

## 2025-01-07 DIAGNOSIS — I10 ESSENTIAL (PRIMARY) HYPERTENSION: ICD-10-CM

## 2025-01-08 DIAGNOSIS — N18.31 CHRONIC RENAL IMPAIRMENT, STAGE 3A (HCC): ICD-10-CM

## 2025-01-08 DIAGNOSIS — R60.0 LOCALIZED EDEMA: ICD-10-CM

## 2025-01-08 DIAGNOSIS — I10 ESSENTIAL (PRIMARY) HYPERTENSION: ICD-10-CM

## 2025-01-08 DIAGNOSIS — E11.21 TYPE 2 DIABETES WITH NEPHROPATHY (HCC): ICD-10-CM

## 2025-01-09 DIAGNOSIS — R60.0 LOCALIZED EDEMA: ICD-10-CM

## 2025-01-09 DIAGNOSIS — E11.21 TYPE 2 DIABETES WITH NEPHROPATHY (HCC): ICD-10-CM

## 2025-01-09 DIAGNOSIS — N18.31 CHRONIC RENAL IMPAIRMENT, STAGE 3A (HCC): ICD-10-CM

## 2025-01-09 DIAGNOSIS — I10 ESSENTIAL (PRIMARY) HYPERTENSION: ICD-10-CM

## 2025-01-09 RX ORDER — AMLODIPINE AND BENAZEPRIL HYDROCHLORIDE 10; 40 MG/1; MG/1
1 CAPSULE ORAL DAILY
Qty: 90 CAPSULE | Refills: 1 | OUTPATIENT
Start: 2025-01-09

## 2025-01-09 NOTE — TELEPHONE ENCOUNTER
Pending in another encounter - duplicate    For Pharmacy Admin Tracking Only    Program: Medication Refill  CPA in place:    Recommendation Provided To:   Intervention Detail: Discontinued Rx: 1, reason: Duplicate Therapy  Intervention Accepted By:   Gap Closed?:    Time Spent (min): 5

## 2025-01-09 NOTE — TELEPHONE ENCOUNTER
Patient comment: Need refill asap     Last appointment: 6/6/24  Next appointment: Advised to follow-up 12/6/24  Previous refill encounter(s): 10/18/23    Requested Prescriptions     Pending Prescriptions Disp Refills    amLODIPine-benazepril (LOTREL) 10-40 MG per capsule 30 capsule 0     Sig: Take 1 capsule by mouth daily Please call office to schedule an appointment for further refills         For Pharmacy Admin Tracking Only    Program: Medication Refill  CPA in place:    Recommendation Provided To:   Intervention Detail: New Rx: 1, reason: Patient Preference  Intervention Accepted By:   Gap Closed?:    Time Spent (min): 5

## 2025-01-10 ENCOUNTER — TELEPHONE (OUTPATIENT)
Age: 55
End: 2025-01-10

## 2025-01-10 NOTE — TELEPHONE ENCOUNTER
Patient calling for status [phone]. Patient can be reached [phone].

## 2025-01-12 RX ORDER — AMLODIPINE AND BENAZEPRIL HYDROCHLORIDE 10; 40 MG/1; MG/1
1 CAPSULE ORAL DAILY
Qty: 30 CAPSULE | Refills: 0 | Status: SHIPPED | OUTPATIENT
Start: 2025-01-12

## 2025-01-13 RX ORDER — AMLODIPINE AND BENAZEPRIL HYDROCHLORIDE 10; 40 MG/1; MG/1
1 CAPSULE ORAL DAILY
Qty: 90 CAPSULE | Refills: 3 | OUTPATIENT
Start: 2025-01-13

## 2025-02-10 DIAGNOSIS — E11.21 TYPE 2 DIABETES WITH NEPHROPATHY (HCC): ICD-10-CM

## 2025-02-10 DIAGNOSIS — R60.0 LOCALIZED EDEMA: ICD-10-CM

## 2025-02-10 DIAGNOSIS — I10 ESSENTIAL (PRIMARY) HYPERTENSION: ICD-10-CM

## 2025-02-10 DIAGNOSIS — N18.31 CHRONIC RENAL IMPAIRMENT, STAGE 3A (HCC): ICD-10-CM

## 2025-02-11 RX ORDER — AMLODIPINE AND BENAZEPRIL HYDROCHLORIDE 10; 40 MG/1; MG/1
1 CAPSULE ORAL DAILY
Qty: 30 CAPSULE | Refills: 0 | Status: SHIPPED | OUTPATIENT
Start: 2025-02-11

## 2025-02-11 NOTE — TELEPHONE ENCOUNTER
Last appointment: 6/6/24  Next appointment: Advised to follow-up 12/6/24  Previous refill encounter(s): 1/12/25 #30    Requested Prescriptions     Pending Prescriptions Disp Refills    amLODIPine-benazepril (LOTREL) 10-40 MG per capsule [Pharmacy Med Name: amLODIPine Besy-Benazepril HCl 10-40 MG Oral Capsule] 30 capsule 0     Sig: Take 1 capsule by mouth daily Patient needs an appointment for further refills         For Pharmacy Admin Tracking Only    Program: Medication Refill  CPA in place:    Recommendation Provided To:   Intervention Detail: New Rx: 1, reason: Patient Preference  Intervention Accepted By:   Gap Closed?:    Time Spent (min): 5

## 2025-03-13 DIAGNOSIS — I10 ESSENTIAL (PRIMARY) HYPERTENSION: ICD-10-CM

## 2025-03-13 DIAGNOSIS — R60.0 LOCALIZED EDEMA: ICD-10-CM

## 2025-03-13 DIAGNOSIS — N18.31 CHRONIC RENAL IMPAIRMENT, STAGE 3A (HCC): ICD-10-CM

## 2025-03-13 DIAGNOSIS — E11.21 TYPE 2 DIABETES WITH NEPHROPATHY (HCC): ICD-10-CM

## 2025-03-14 RX ORDER — AMLODIPINE AND BENAZEPRIL HYDROCHLORIDE 10; 40 MG/1; MG/1
CAPSULE ORAL
Qty: 30 CAPSULE | Refills: 0 | Status: SHIPPED | OUTPATIENT
Start: 2025-03-14

## 2025-03-14 NOTE — TELEPHONE ENCOUNTER
Last appointment: 6/6/24  Next appointment: Advised to follow-up 12/6/24  Previous refill encounter(s): 2/11/24 #30    Requested Prescriptions     Pending Prescriptions Disp Refills    amLODIPine-benazepril (LOTREL) 10-40 MG per capsule [Pharmacy Med Name: amLODIPine Besy-Benazepril HCl 10-40 MG Oral Capsule] 30 capsule 0     Sig: TAKE 1 CAPSULE BY MOUTH ONCE DAILY ,  NEED  APPOINTMENT  FOR  FURTHER  REFILLS         For Pharmacy Admin Tracking Only    Program: Medication Refill  CPA in place:    Recommendation Provided To:   Intervention Detail: New Rx: 1, reason: Patient Preference  Intervention Accepted By:   Gap Closed?:    Time Spent (min): 5

## 2025-03-19 DIAGNOSIS — E11.42 DIABETIC POLYNEUROPATHY ASSOCIATED WITH TYPE 2 DIABETES MELLITUS (HCC): ICD-10-CM

## 2025-03-19 DIAGNOSIS — E11.21 TYPE 2 DIABETES WITH NEPHROPATHY (HCC): ICD-10-CM

## 2025-03-19 DIAGNOSIS — I10 PRIMARY HYPERTENSION: ICD-10-CM

## 2025-03-19 DIAGNOSIS — E11.42 DIABETIC POLYNEUROPATHY ASSOCIATED WITH TYPE 2 DIABETES MELLITUS: ICD-10-CM

## 2025-03-20 NOTE — TELEPHONE ENCOUNTER
Duplicate    For Pharmacy Admin Tracking Only    Program: Medication Refill  CPA in place:    Recommendation Provided To:   Intervention Detail: Discontinued Rx: 1, reason: Duplicate Therapy  Intervention Accepted By:   Gap Closed?:    Time Spent (min): 5

## 2025-03-20 NOTE — TELEPHONE ENCOUNTER
Last appointment: 6/6/24  Next appointment: Advised to follow-up 12/6/24  Previous refill encounter(s): 11/27/24 #2ml with 2 refills    Requested Prescriptions     Pending Prescriptions Disp Refills    dulaglutide (TRULICITY) 1.5 MG/0.5ML SC injection [Pharmacy Med Name: Trulicity 1.5 MG/0.5ML Subcutaneous Solution Pen-injector] 2 mL 2     Sig: INJECT 1/2 (ONE-HALF) ML SUBCUTANEOUSLY  ONCE A WEEK         For Pharmacy Admin Tracking Only    Program: Medication Refill  CPA in place:    Recommendation Provided To:   Intervention Detail: New Rx: 1, reason: Patient Preference  Intervention Accepted By:   Gap Closed?:    Time Spent (min): 5

## 2025-03-21 RX ORDER — DULAGLUTIDE 1.5 MG/.5ML
INJECTION, SOLUTION SUBCUTANEOUS
Qty: 2 ML | Refills: 2 | Status: SHIPPED | OUTPATIENT
Start: 2025-03-21

## 2025-03-21 RX ORDER — DULAGLUTIDE 1.5 MG/.5ML
INJECTION, SOLUTION SUBCUTANEOUS
Qty: 2 ML | Refills: 2 | OUTPATIENT
Start: 2025-03-21

## 2025-04-02 RX ORDER — GLIPIZIDE 10 MG/1
15 TABLET ORAL 2 TIMES DAILY
Qty: 60 TABLET | Refills: 0 | Status: SHIPPED | OUTPATIENT
Start: 2025-04-02

## 2025-04-14 DIAGNOSIS — E11.21 TYPE 2 DIABETES WITH NEPHROPATHY (HCC): ICD-10-CM

## 2025-04-14 DIAGNOSIS — N18.31 CHRONIC RENAL IMPAIRMENT, STAGE 3A (HCC): ICD-10-CM

## 2025-04-14 DIAGNOSIS — R60.0 LOCALIZED EDEMA: ICD-10-CM

## 2025-04-14 DIAGNOSIS — I10 ESSENTIAL (PRIMARY) HYPERTENSION: ICD-10-CM

## 2025-04-15 DIAGNOSIS — E11.21 TYPE 2 DIABETES WITH NEPHROPATHY (HCC): ICD-10-CM

## 2025-04-15 DIAGNOSIS — I10 ESSENTIAL (PRIMARY) HYPERTENSION: ICD-10-CM

## 2025-04-15 DIAGNOSIS — N18.31 CHRONIC RENAL IMPAIRMENT, STAGE 3A (HCC): ICD-10-CM

## 2025-04-15 DIAGNOSIS — R60.0 LOCALIZED EDEMA: ICD-10-CM

## 2025-04-16 RX ORDER — AMLODIPINE AND BENAZEPRIL HYDROCHLORIDE 10; 40 MG/1; MG/1
CAPSULE ORAL
Qty: 30 CAPSULE | Refills: 0 | OUTPATIENT
Start: 2025-04-16

## 2025-04-17 DIAGNOSIS — R60.0 LOCALIZED EDEMA: ICD-10-CM

## 2025-04-17 DIAGNOSIS — N18.31 CHRONIC RENAL IMPAIRMENT, STAGE 3A (HCC): ICD-10-CM

## 2025-04-17 DIAGNOSIS — E11.21 TYPE 2 DIABETES WITH NEPHROPATHY (HCC): ICD-10-CM

## 2025-04-17 DIAGNOSIS — I10 ESSENTIAL (PRIMARY) HYPERTENSION: ICD-10-CM

## 2025-04-17 RX ORDER — AMLODIPINE AND BENAZEPRIL HYDROCHLORIDE 10; 40 MG/1; MG/1
CAPSULE ORAL
Qty: 30 CAPSULE | Refills: 0 | OUTPATIENT
Start: 2025-04-17

## 2025-04-18 RX ORDER — AMLODIPINE AND BENAZEPRIL HYDROCHLORIDE 10; 40 MG/1; MG/1
CAPSULE ORAL
Qty: 30 CAPSULE | Refills: 0 | Status: SHIPPED | OUTPATIENT
Start: 2025-04-18

## 2025-05-02 RX ORDER — ALLOPURINOL 100 MG/1
200 TABLET ORAL DAILY
Qty: 180 TABLET | Refills: 1 | Status: SHIPPED | OUTPATIENT
Start: 2025-05-02

## 2025-05-06 ENCOUNTER — TELEPHONE (OUTPATIENT)
Age: 55
End: 2025-05-06

## 2025-05-06 NOTE — TELEPHONE ENCOUNTER
Attempted to contact patient regarding upcoming Medicare wellness appointment and completion of HRA questionnaire. LVM for patient to please return call at  [phone].

## 2025-05-08 ENCOUNTER — OFFICE VISIT (OUTPATIENT)
Age: 55
End: 2025-05-08
Payer: COMMERCIAL

## 2025-05-08 VITALS
HEIGHT: 73 IN | HEART RATE: 58 BPM | BODY MASS INDEX: 41.75 KG/M2 | RESPIRATION RATE: 19 BRPM | DIASTOLIC BLOOD PRESSURE: 74 MMHG | TEMPERATURE: 97.7 F | SYSTOLIC BLOOD PRESSURE: 124 MMHG | WEIGHT: 315 LBS | OXYGEN SATURATION: 95 %

## 2025-05-08 DIAGNOSIS — E11.42 DIABETIC POLYNEUROPATHY ASSOCIATED WITH TYPE 2 DIABETES MELLITUS (HCC): ICD-10-CM

## 2025-05-08 DIAGNOSIS — N52.9 ERECTILE DISORDER: ICD-10-CM

## 2025-05-08 DIAGNOSIS — N18.31 CHRONIC RENAL IMPAIRMENT, STAGE 3A (HCC): ICD-10-CM

## 2025-05-08 DIAGNOSIS — Z00.00 MEDICARE ANNUAL WELLNESS VISIT, SUBSEQUENT: ICD-10-CM

## 2025-05-08 DIAGNOSIS — I10 ESSENTIAL (PRIMARY) HYPERTENSION: ICD-10-CM

## 2025-05-08 DIAGNOSIS — E11.21 TYPE 2 DIABETES WITH NEPHROPATHY (HCC): ICD-10-CM

## 2025-05-08 DIAGNOSIS — I10 PRIMARY HYPERTENSION: ICD-10-CM

## 2025-05-08 DIAGNOSIS — L30.8 PSORIASIFORM DERMATITIS: Primary | ICD-10-CM

## 2025-05-08 DIAGNOSIS — R60.0 LOCALIZED EDEMA: ICD-10-CM

## 2025-05-08 LAB
ALBUMIN SERPL-MCNC: 3.5 G/DL (ref 3.5–5)
ALBUMIN/GLOB SERPL: 0.8 (ref 1.1–2.2)
ALP SERPL-CCNC: 85 U/L (ref 45–117)
ALT SERPL-CCNC: 35 U/L (ref 12–78)
ANION GAP SERPL CALC-SCNC: 6 MMOL/L (ref 2–12)
AST SERPL-CCNC: 27 U/L (ref 15–37)
BASOPHILS # BLD: 0.04 K/UL (ref 0–0.1)
BASOPHILS NFR BLD: 0.4 % (ref 0–1)
BILIRUB SERPL-MCNC: 0.4 MG/DL (ref 0.2–1)
BUN SERPL-MCNC: 39 MG/DL (ref 6–20)
BUN/CREAT SERPL: 23 (ref 12–20)
CALCIUM SERPL-MCNC: 9.3 MG/DL (ref 8.5–10.1)
CHLORIDE SERPL-SCNC: 100 MMOL/L (ref 97–108)
CHOLEST SERPL-MCNC: 160 MG/DL
CO2 SERPL-SCNC: 33 MMOL/L (ref 21–32)
CREAT SERPL-MCNC: 1.73 MG/DL (ref 0.7–1.3)
CREAT UR-MCNC: 69.4 MG/DL
DIFFERENTIAL METHOD BLD: ABNORMAL
EOSINOPHIL # BLD: 0.44 K/UL (ref 0–0.4)
EOSINOPHIL NFR BLD: 4.8 % (ref 0–7)
ERYTHROCYTE [DISTWIDTH] IN BLOOD BY AUTOMATED COUNT: 15.4 % (ref 11.5–14.5)
EST. AVERAGE GLUCOSE BLD GHB EST-MCNC: 206 MG/DL
GLOBULIN SER CALC-MCNC: 4.5 G/DL (ref 2–4)
GLUCOSE SERPL-MCNC: 260 MG/DL (ref 65–100)
HBA1C MFR BLD: 8.8 % (ref 4–5.6)
HCT VFR BLD AUTO: 43.7 % (ref 36.6–50.3)
HDLC SERPL-MCNC: 43 MG/DL
HDLC SERPL: 3.7 (ref 0–5)
HGB BLD-MCNC: 13.6 G/DL (ref 12.1–17)
IMM GRANULOCYTES # BLD AUTO: 0.02 K/UL (ref 0–0.04)
IMM GRANULOCYTES NFR BLD AUTO: 0.2 % (ref 0–0.5)
LDLC SERPL CALC-MCNC: 95.8 MG/DL (ref 0–100)
LYMPHOCYTES # BLD: 1.58 K/UL (ref 0.8–3.5)
LYMPHOCYTES NFR BLD: 17.4 % (ref 12–49)
MCH RBC QN AUTO: 28 PG (ref 26–34)
MCHC RBC AUTO-ENTMCNC: 31.1 G/DL (ref 30–36.5)
MCV RBC AUTO: 90.1 FL (ref 80–99)
MICROALBUMIN UR-MCNC: 1.94 MG/DL
MICROALBUMIN/CREAT UR-RTO: 28 MG/G (ref 0–30)
MONOCYTES # BLD: 0.82 K/UL (ref 0–1)
MONOCYTES NFR BLD: 9 % (ref 5–13)
NEUTS SEG # BLD: 6.2 K/UL (ref 1.8–8)
NEUTS SEG NFR BLD: 68.2 % (ref 32–75)
NRBC # BLD: 0 K/UL (ref 0–0.01)
NRBC BLD-RTO: 0 PER 100 WBC
PLATELET # BLD AUTO: 221 K/UL (ref 150–400)
PMV BLD AUTO: 10.3 FL (ref 8.9–12.9)
POTASSIUM SERPL-SCNC: 3.6 MMOL/L (ref 3.5–5.1)
PROT SERPL-MCNC: 8 G/DL (ref 6.4–8.2)
PSA SERPL-MCNC: 0.5 NG/ML (ref 0.01–4)
RBC # BLD AUTO: 4.85 M/UL (ref 4.1–5.7)
SODIUM SERPL-SCNC: 139 MMOL/L (ref 136–145)
T4 FREE SERPL-MCNC: 1.1 NG/DL (ref 0.8–1.5)
TRIGL SERPL-MCNC: 106 MG/DL
TSH SERPL DL<=0.05 MIU/L-ACNC: 3.47 UIU/ML (ref 0.36–3.74)
VLDLC SERPL CALC-MCNC: 21.2 MG/DL
WBC # BLD AUTO: 9.1 K/UL (ref 4.1–11.1)

## 2025-05-08 PROCEDURE — 3078F DIAST BP <80 MM HG: CPT | Performed by: FAMILY MEDICINE

## 2025-05-08 PROCEDURE — 3074F SYST BP LT 130 MM HG: CPT | Performed by: FAMILY MEDICINE

## 2025-05-08 PROCEDURE — 3052F HG A1C>EQUAL 8.0%<EQUAL 9.0%: CPT | Performed by: FAMILY MEDICINE

## 2025-05-08 PROCEDURE — G0439 PPPS, SUBSEQ VISIT: HCPCS | Performed by: FAMILY MEDICINE

## 2025-05-08 RX ORDER — TORSEMIDE 20 MG/1
60 TABLET ORAL DAILY
Qty: 270 TABLET | Refills: 3 | Status: SHIPPED | OUTPATIENT
Start: 2025-05-08

## 2025-05-08 RX ORDER — APREMILAST 10-20-30MG
KIT ORAL
Qty: 55 TABLET | Refills: 5 | Status: SHIPPED | OUTPATIENT
Start: 2025-05-08 | End: 2025-05-09 | Stop reason: SDUPTHER

## 2025-05-08 RX ORDER — CHLORTHALIDONE 25 MG/1
25 TABLET ORAL DAILY
Qty: 90 TABLET | Refills: 3 | Status: SHIPPED | OUTPATIENT
Start: 2025-05-08

## 2025-05-08 RX ORDER — AMLODIPINE AND BENAZEPRIL HYDROCHLORIDE 10; 40 MG/1; MG/1
1 CAPSULE ORAL DAILY
Qty: 90 CAPSULE | Refills: 3 | Status: SHIPPED | OUTPATIENT
Start: 2025-05-08

## 2025-05-08 RX ORDER — DULAGLUTIDE 1.5 MG/.5ML
INJECTION, SOLUTION SUBCUTANEOUS
Qty: 2 ML | Refills: 2 | Status: CANCELLED | OUTPATIENT
Start: 2025-05-08

## 2025-05-08 RX ORDER — CLOBETASOL PROPIONATE 0.5 MG/G
OINTMENT TOPICAL
Qty: 60 G | Refills: 5 | Status: SHIPPED | OUTPATIENT
Start: 2025-05-08

## 2025-05-08 RX ORDER — GLIPIZIDE 10 MG/1
10 TABLET ORAL
Qty: 180 TABLET | Refills: 3 | Status: SHIPPED | OUTPATIENT
Start: 2025-05-08

## 2025-05-08 SDOH — ECONOMIC STABILITY: FOOD INSECURITY: WITHIN THE PAST 12 MONTHS, THE FOOD YOU BOUGHT JUST DIDN'T LAST AND YOU DIDN'T HAVE MONEY TO GET MORE.: NEVER TRUE

## 2025-05-08 SDOH — ECONOMIC STABILITY: FOOD INSECURITY: WITHIN THE PAST 12 MONTHS, YOU WORRIED THAT YOUR FOOD WOULD RUN OUT BEFORE YOU GOT MONEY TO BUY MORE.: NEVER TRUE

## 2025-05-08 ASSESSMENT — PATIENT HEALTH QUESTIONNAIRE - PHQ9
SUM OF ALL RESPONSES TO PHQ QUESTIONS 1-9: 0
2. FEELING DOWN, DEPRESSED OR HOPELESS: NOT AT ALL
SUM OF ALL RESPONSES TO PHQ QUESTIONS 1-9: 0
1. LITTLE INTEREST OR PLEASURE IN DOING THINGS: NOT AT ALL

## 2025-05-09 DIAGNOSIS — I10 PRIMARY HYPERTENSION: ICD-10-CM

## 2025-05-09 DIAGNOSIS — R60.0 LOCALIZED EDEMA: ICD-10-CM

## 2025-05-09 DIAGNOSIS — L30.8 PSORIASIFORM DERMATITIS: ICD-10-CM

## 2025-05-09 DIAGNOSIS — E11.42 DIABETIC POLYNEUROPATHY ASSOCIATED WITH TYPE 2 DIABETES MELLITUS (HCC): ICD-10-CM

## 2025-05-09 DIAGNOSIS — N18.31 CHRONIC RENAL IMPAIRMENT, STAGE 3A (HCC): ICD-10-CM

## 2025-05-09 DIAGNOSIS — I10 ESSENTIAL (PRIMARY) HYPERTENSION: ICD-10-CM

## 2025-05-10 LAB
TESTOST FREE SERPL-MCNC: 11.1 PG/ML (ref 7.2–24)
TESTOST SERPL-MCNC: 246 NG/DL (ref 264–916)

## 2025-05-12 RX ORDER — APREMILAST 10-20-30MG
KIT ORAL
Qty: 55 TABLET | Refills: 5 | Status: SHIPPED | OUTPATIENT
Start: 2025-05-12

## 2025-05-13 ENCOUNTER — RESULTS FOLLOW-UP (OUTPATIENT)
Age: 55
End: 2025-05-13

## 2025-06-17 DIAGNOSIS — L40.1 PLAQUE PSORIASIS WITH PUSTULES: Primary | ICD-10-CM

## 2025-06-17 DIAGNOSIS — L40.0 PLAQUE PSORIASIS WITH PUSTULES: Primary | ICD-10-CM
